# Patient Record
Sex: MALE | Race: BLACK OR AFRICAN AMERICAN | Employment: UNEMPLOYED | URBAN - METROPOLITAN AREA
[De-identification: names, ages, dates, MRNs, and addresses within clinical notes are randomized per-mention and may not be internally consistent; named-entity substitution may affect disease eponyms.]

---

## 2017-02-09 LAB — VITAMIN D 25-HYDROXY: 93.3 NG/ML

## 2017-06-20 ENCOUNTER — OFFICE VISIT (OUTPATIENT)
Dept: INTERNAL MEDICINE | Age: 57
End: 2017-06-20

## 2017-06-20 VITALS
DIASTOLIC BLOOD PRESSURE: 88 MMHG | WEIGHT: 163 LBS | SYSTOLIC BLOOD PRESSURE: 136 MMHG | BODY MASS INDEX: 22.08 KG/M2 | HEIGHT: 72 IN | HEART RATE: 96 BPM

## 2017-06-20 DIAGNOSIS — Z12.5 SPECIAL SCREENING FOR MALIGNANT NEOPLASM OF PROSTATE: Primary | ICD-10-CM

## 2017-06-20 DIAGNOSIS — F10.10 ALCOHOL ABUSE, DAILY USE: ICD-10-CM

## 2017-06-20 DIAGNOSIS — R74.01 ELEVATED TRANSAMINASE LEVEL: ICD-10-CM

## 2017-06-20 DIAGNOSIS — G47.00 INSOMNIA, UNSPECIFIED TYPE: ICD-10-CM

## 2017-06-20 DIAGNOSIS — E78.5 HYPERLIPIDEMIA, UNSPECIFIED HYPERLIPIDEMIA TYPE: ICD-10-CM

## 2017-06-20 DIAGNOSIS — R71.8 ELEVATED MCV: ICD-10-CM

## 2017-06-20 DIAGNOSIS — I10 ESSENTIAL HYPERTENSION: ICD-10-CM

## 2017-06-20 PROCEDURE — 99215 OFFICE O/P EST HI 40 MIN: CPT | Performed by: INTERNAL MEDICINE

## 2017-06-20 ASSESSMENT — PATIENT HEALTH QUESTIONNAIRE - PHQ9
1. LITTLE INTEREST OR PLEASURE IN DOING THINGS: 0
SUM OF ALL RESPONSES TO PHQ9 QUESTIONS 1 & 2: 0
SUM OF ALL RESPONSES TO PHQ QUESTIONS 1-9: 0
2. FEELING DOWN, DEPRESSED OR HOPELESS: 0

## 2017-06-20 ASSESSMENT — ENCOUNTER SYMPTOMS
TROUBLE SWALLOWING: 0
GASTROINTESTINAL NEGATIVE: 1
RESPIRATORY NEGATIVE: 1
BACK PAIN: 0

## 2018-06-07 PROBLEM — R17 JAUNDICE: Status: ACTIVE | Noted: 2018-06-07

## 2018-06-12 PROBLEM — E44.0 MODERATE MALNUTRITION (HCC): Chronic | Status: ACTIVE | Noted: 2018-06-12

## 2018-07-18 ENCOUNTER — ANESTHESIA EVENT (OUTPATIENT)
Dept: ENDOSCOPY | Age: 58
End: 2018-07-18
Payer: COMMERCIAL

## 2018-07-18 ENCOUNTER — HOSPITAL ENCOUNTER (OUTPATIENT)
Age: 58
Setting detail: OUTPATIENT SURGERY
Discharge: HOME | End: 2018-07-18
Attending: INTERNAL MEDICINE | Admitting: INTERNAL MEDICINE
Payer: COMMERCIAL

## 2018-07-18 ENCOUNTER — ANESTHESIA (OUTPATIENT)
Dept: ENDOSCOPY | Age: 58
End: 2018-07-18
Payer: COMMERCIAL

## 2018-07-18 VITALS
DIASTOLIC BLOOD PRESSURE: 66 MMHG | OXYGEN SATURATION: 97 % | RESPIRATION RATE: 19 BRPM | SYSTOLIC BLOOD PRESSURE: 88 MMHG

## 2018-07-18 VITALS
SYSTOLIC BLOOD PRESSURE: 95 MMHG | BODY MASS INDEX: 18.55 KG/M2 | WEIGHT: 140 LBS | TEMPERATURE: 97.7 F | RESPIRATION RATE: 16 BRPM | HEART RATE: 87 BPM | HEIGHT: 73 IN | OXYGEN SATURATION: 100 % | DIASTOLIC BLOOD PRESSURE: 63 MMHG

## 2018-07-18 PROCEDURE — 2720000010 HC SURG SUPPLY STERILE: Performed by: INTERNAL MEDICINE

## 2018-07-18 PROCEDURE — 2580000003 HC RX 258: Performed by: INTERNAL MEDICINE

## 2018-07-18 PROCEDURE — 3700000000 HC ANESTHESIA ATTENDED CARE: Performed by: INTERNAL MEDICINE

## 2018-07-18 PROCEDURE — 6360000002 HC RX W HCPCS: Performed by: NURSE ANESTHETIST, CERTIFIED REGISTERED

## 2018-07-18 PROCEDURE — 3700000001 HC ADD 15 MINUTES (ANESTHESIA): Performed by: INTERNAL MEDICINE

## 2018-07-18 PROCEDURE — 3609017100 HC EGD: Performed by: INTERNAL MEDICINE

## 2018-07-18 PROCEDURE — 7100000010 HC PHASE II RECOVERY - FIRST 15 MIN: Performed by: INTERNAL MEDICINE

## 2018-07-18 PROCEDURE — 2500000003 HC RX 250 WO HCPCS: Performed by: NURSE ANESTHETIST, CERTIFIED REGISTERED

## 2018-07-18 PROCEDURE — 7100000011 HC PHASE II RECOVERY - ADDTL 15 MIN: Performed by: INTERNAL MEDICINE

## 2018-07-18 RX ORDER — PROPOFOL 10 MG/ML
INJECTION, EMULSION INTRAVENOUS PRN
Status: DISCONTINUED | OUTPATIENT
Start: 2018-07-18 | End: 2018-07-18 | Stop reason: SDUPTHER

## 2018-07-18 RX ORDER — HYDRALAZINE HYDROCHLORIDE 20 MG/ML
5 INJECTION INTRAMUSCULAR; INTRAVENOUS EVERY 10 MIN PRN
Status: DISCONTINUED | OUTPATIENT
Start: 2018-07-18 | End: 2018-07-18 | Stop reason: HOSPADM

## 2018-07-18 RX ORDER — MEPERIDINE HYDROCHLORIDE 25 MG/ML
12.5 INJECTION INTRAMUSCULAR; INTRAVENOUS; SUBCUTANEOUS EVERY 5 MIN PRN
Status: DISCONTINUED | OUTPATIENT
Start: 2018-07-18 | End: 2018-07-18 | Stop reason: HOSPADM

## 2018-07-18 RX ORDER — METOCLOPRAMIDE HYDROCHLORIDE 5 MG/ML
10 INJECTION INTRAMUSCULAR; INTRAVENOUS
Status: DISCONTINUED | OUTPATIENT
Start: 2018-07-18 | End: 2018-07-18 | Stop reason: HOSPADM

## 2018-07-18 RX ORDER — MORPHINE SULFATE 2 MG/ML
1 INJECTION, SOLUTION INTRAMUSCULAR; INTRAVENOUS EVERY 5 MIN PRN
Status: DISCONTINUED | OUTPATIENT
Start: 2018-07-18 | End: 2018-07-18 | Stop reason: HOSPADM

## 2018-07-18 RX ORDER — SODIUM CHLORIDE, SODIUM LACTATE, POTASSIUM CHLORIDE, CALCIUM CHLORIDE 600; 310; 30; 20 MG/100ML; MG/100ML; MG/100ML; MG/100ML
INJECTION, SOLUTION INTRAVENOUS CONTINUOUS
Status: DISCONTINUED | OUTPATIENT
Start: 2018-07-18 | End: 2018-07-18 | Stop reason: HOSPADM

## 2018-07-18 RX ORDER — OXYCODONE HYDROCHLORIDE 5 MG/1
10 TABLET ORAL PRN
Status: DISCONTINUED | OUTPATIENT
Start: 2018-07-18 | End: 2018-07-18 | Stop reason: HOSPADM

## 2018-07-18 RX ORDER — LIDOCAINE HYDROCHLORIDE 20 MG/ML
INJECTION, SOLUTION EPIDURAL; INFILTRATION; INTRACAUDAL; PERINEURAL PRN
Status: DISCONTINUED | OUTPATIENT
Start: 2018-07-18 | End: 2018-07-18 | Stop reason: SDUPTHER

## 2018-07-18 RX ORDER — OXYCODONE HYDROCHLORIDE 5 MG/1
5 TABLET ORAL PRN
Status: DISCONTINUED | OUTPATIENT
Start: 2018-07-18 | End: 2018-07-18 | Stop reason: HOSPADM

## 2018-07-18 RX ORDER — LABETALOL HYDROCHLORIDE 5 MG/ML
5 INJECTION, SOLUTION INTRAVENOUS EVERY 10 MIN PRN
Status: DISCONTINUED | OUTPATIENT
Start: 2018-07-18 | End: 2018-07-18 | Stop reason: HOSPADM

## 2018-07-18 RX ORDER — DIPHENHYDRAMINE HYDROCHLORIDE 50 MG/ML
12.5 INJECTION INTRAMUSCULAR; INTRAVENOUS
Status: DISCONTINUED | OUTPATIENT
Start: 2018-07-18 | End: 2018-07-18 | Stop reason: HOSPADM

## 2018-07-18 RX ORDER — PROMETHAZINE HYDROCHLORIDE 25 MG/ML
6.25 INJECTION, SOLUTION INTRAMUSCULAR; INTRAVENOUS
Status: DISCONTINUED | OUTPATIENT
Start: 2018-07-18 | End: 2018-07-18 | Stop reason: HOSPADM

## 2018-07-18 RX ADMIN — LIDOCAINE HYDROCHLORIDE 100 MG: 20 INJECTION, SOLUTION EPIDURAL; INFILTRATION; INTRACAUDAL; PERINEURAL at 13:30

## 2018-07-18 RX ADMIN — PROPOFOL 80 MG: 10 INJECTION, EMULSION INTRAVENOUS at 13:30

## 2018-07-18 RX ADMIN — SODIUM CHLORIDE, SODIUM LACTATE, POTASSIUM CHLORIDE, AND CALCIUM CHLORIDE: 600; 310; 30; 20 INJECTION, SOLUTION INTRAVENOUS at 13:28

## 2018-07-18 RX ADMIN — PROPOFOL 5 MG: 10 INJECTION, EMULSION INTRAVENOUS at 13:38

## 2018-07-18 ASSESSMENT — PULMONARY FUNCTION TESTS
PIF_VALUE: 0

## 2018-07-18 NOTE — ANESTHESIA PRE PROCEDURE
Department of Anesthesiology  Preprocedure Note       Name:  Doris Khan   Age:  61 y.o.  :  1960                                          MRN:  [id number]         Date:  2018      Surgeon: Crystal Jones):  Tressa Nguyen MD    Procedure: Procedure(s):  EGD ESOPHAGOGASTRODUODENOSCOPY WITH MAC ANESTHESIA    Medications prior to admission:   Prior to Admission medications    Medication Sig Start Date End Date Taking?  Authorizing Provider   lactulose (CHRONULAC) 10 GM/15ML solution Take 30 mLs by mouth 3 times daily 18 Yes Samia Nova MD   rifaximin (XIFAXAN) 550 MG tablet Take 1 tablet by mouth 2 times daily 18  Yes Samia Nova MD   pantoprazole (PROTONIX) 40 MG tablet Take 1 tablet by mouth daily 18  Yes Samia Nova MD       Current medications:    Current Facility-Administered Medications   Medication Dose Route Frequency Provider Last Rate Last Dose    lactated ringers infusion   Intravenous Continuous Tressa Nguyen MD        HYDROmorphone (DILAUDID) injection 0.25 mg  0.25 mg Intravenous Q5 Min PRN Elena Rodgers MD        HYDROmorphone (DILAUDID) injection 0.5 mg  0.5 mg Intravenous Q5 Min PRN Elena Rodgers MD        morphine injection 1 mg  1 mg Intravenous Q5 Min PRKARLA Rodgers MD        HYDROmorphone (DILAUDID) injection 0.5 mg  0.5 mg Intravenous Q5 Min PRN Elena Rodgers MD        oxyCODONE (ROXICODONE) immediate release tablet 5 mg  5 mg Oral PRN Elena Rodgers MD        Or    oxyCODONE (ROXICODONE) immediate release tablet 10 mg  10 mg Oral PRN Elena Rodgers MD        diphenhydrAMINE (BENADRYL) injection 12.5 mg  12.5 mg Intravenous Once PRN Elena Rodgers MD        metoclopramide [facility]) injection 10 mg  10 mg Intravenous Once PRN Elena Rodgers MD        [facility]) injection 6.25 mg  6.25 mg Intravenous Once PRN Elena Rodgers MD        labetalol (NORMODYNE;TRANDATE) injection 5 mg  5 mg Intravenous

## 2018-07-26 DIAGNOSIS — N17.9 AKI (ACUTE KIDNEY INJURY) (HCC): ICD-10-CM

## 2018-07-26 LAB
ALBUMIN SERPL-MCNC: 3.7 G/DL (ref 3.4–5)
ANION GAP SERPL CALCULATED.3IONS-SCNC: 24 MMOL/L (ref 3–16)
BUN BLDV-MCNC: 16 MG/DL (ref 7–20)
CALCIUM SERPL-MCNC: 10.5 MG/DL (ref 8.3–10.6)
CHLORIDE BLD-SCNC: 91 MMOL/L (ref 99–110)
CO2: 19 MMOL/L (ref 21–32)
CREAT SERPL-MCNC: <0.5 MG/DL (ref 0.9–1.3)
GFR AFRICAN AMERICAN: >60
GFR NON-AFRICAN AMERICAN: >60
GLUCOSE BLD-MCNC: 124 MG/DL (ref 70–99)
PHOSPHORUS: 3.4 MG/DL (ref 2.5–4.9)
POTASSIUM SERPL-SCNC: 3.1 MMOL/L (ref 3.5–5.1)
SODIUM BLD-SCNC: 134 MMOL/L (ref 136–145)

## 2018-08-08 ENCOUNTER — OFFICE VISIT (OUTPATIENT)
Dept: INTERNAL MEDICINE | Age: 58
End: 2018-08-08

## 2018-08-08 VITALS
HEIGHT: 73 IN | DIASTOLIC BLOOD PRESSURE: 48 MMHG | BODY MASS INDEX: 15.24 KG/M2 | SYSTOLIC BLOOD PRESSURE: 70 MMHG | WEIGHT: 115 LBS | HEART RATE: 80 BPM

## 2018-08-08 DIAGNOSIS — R17 JAUNDICE: ICD-10-CM

## 2018-08-08 DIAGNOSIS — F10.11 HISTORY OF ALCOHOL ABUSE: ICD-10-CM

## 2018-08-08 DIAGNOSIS — K70.9 LIVER DISEASE, CHRONIC, DUE TO ALCOHOL (HCC): ICD-10-CM

## 2018-08-08 DIAGNOSIS — I10 ESSENTIAL HYPERTENSION: ICD-10-CM

## 2018-08-08 PROCEDURE — G8427 DOCREV CUR MEDS BY ELIG CLIN: HCPCS | Performed by: INTERNAL MEDICINE

## 2018-08-08 PROCEDURE — 1036F TOBACCO NON-USER: CPT | Performed by: INTERNAL MEDICINE

## 2018-08-08 PROCEDURE — 99215 OFFICE O/P EST HI 40 MIN: CPT | Performed by: INTERNAL MEDICINE

## 2018-08-08 PROCEDURE — 3017F COLORECTAL CA SCREEN DOC REV: CPT | Performed by: INTERNAL MEDICINE

## 2018-08-08 PROCEDURE — G8419 CALC BMI OUT NRM PARAM NOF/U: HCPCS | Performed by: INTERNAL MEDICINE

## 2018-08-08 RX ORDER — POTASSIUM CHLORIDE 750 MG/1
TABLET, FILM COATED, EXTENDED RELEASE ORAL
COMMUNITY
Start: 2018-08-02 | End: 2018-09-18 | Stop reason: CLARIF

## 2018-08-08 ASSESSMENT — ENCOUNTER SYMPTOMS
BLOOD IN STOOL: 0
VOMITING: 0
ABDOMINAL PAIN: 0
ABDOMINAL DISTENTION: 0
TROUBLE SWALLOWING: 0
DIARRHEA: 0
NAUSEA: 0
COUGH: 1

## 2018-08-08 ASSESSMENT — PATIENT HEALTH QUESTIONNAIRE - PHQ9
SUM OF ALL RESPONSES TO PHQ QUESTIONS 1-9: 0
SUM OF ALL RESPONSES TO PHQ QUESTIONS 1-9: 0
1. LITTLE INTEREST OR PLEASURE IN DOING THINGS: 0
2. FEELING DOWN, DEPRESSED OR HOPELESS: 0
SUM OF ALL RESPONSES TO PHQ9 QUESTIONS 1 & 2: 0

## 2018-08-08 NOTE — PROGRESS NOTES
for rash. Neurological: Positive for weakness. Negative for dizziness, light-headedness and headaches. Hematological: Bruises/bleeds easily. Psychiatric/Behavioral: Positive for sleep disturbance. OBJECTIVE:    BP (!) 70/48 (Site: Right Arm, Position: Sitting, Cuff Size: Medium Adult)   Pulse 80   Ht 6' 1\" (1.854 m)   Wt 115 lb (52.2 kg)   BMI 15.17 kg/m²      Physical Exam   Constitutional:   Frail. Moves very slowly. Requires some assistance in getting up on the exam table. Easily fatigued when ambulating. Eyes: EOM are normal. Scleral icterus is present. Neck: Neck supple. No JVD present. No thyromegaly present. Cardiovascular: Normal rate, regular rhythm and normal heart sounds. Exam reveals no gallop. No murmur heard. Pulmonary/Chest: Effort normal and breath sounds normal. No stridor. No respiratory distress. He has no wheezes. Abdominal: Soft. Bowel sounds are normal. He exhibits no pulsatile liver, no abdominal bruit and no pulsatile midline mass. There is hepatomegaly (tender palpable below ribs). There is tenderness in the right upper quadrant. Musculoskeletal: He exhibits no edema. Lymphadenopathy:        Head (right side): No submandibular adenopathy present. Head (left side): No submandibular adenopathy present. He has no cervical adenopathy. Right: No supraclavicular adenopathy present. Left: No supraclavicular adenopathy present. Neurological: He is alert. He displays no tremor. Skin: He is not diaphoretic. Jaundiced   Psychiatric: He is slowed. He is not actively hallucinating. Thought content is not delusional.   Very soft voice, slow response       ASSESSMENT:       Encounter Diagnoses   Name Primary?  History of alcohol abuse     Liver disease, chronic, due to alcohol ([facility] [address]     Jaundice     Essential hypertension        History of alcohol abuse  Patient has end-stage liver disease.   Although blood pressure is low he does not

## 2018-08-08 NOTE — ASSESSMENT & PLAN NOTE
No alcohol use and approximately 8 months. He has been referred for possible liver transplant evaluation.

## 2018-08-13 ENCOUNTER — TELEPHONE (OUTPATIENT)
Dept: INTERNAL MEDICINE | Age: 58
End: 2018-08-13

## 2018-08-13 NOTE — TELEPHONE ENCOUNTER
Glorine Landau states that pt needs PT order for Home Health    Pt mother is unable to treat wounds/ and care    Pt has a bed sore on right hip    Glorine Landau has a few suggestions for [facility] fax # [phone]

## 2018-08-13 NOTE — TELEPHONE ENCOUNTER
Patient has a bed sore r hip size of silver dollar, scab has formed. Patients mother says its really sore and she wants to know what to do about it. Should she use antibiotic ointment, cover it with sterile dressing? Please advise.

## 2018-08-13 NOTE — TELEPHONE ENCOUNTER
Polysporin ointment  Try to keep pressure off of the wound  If it looks infected or appears to be worsening then refer to wound clinic

## 2018-08-16 ENCOUNTER — HOSPITAL ENCOUNTER (OUTPATIENT)
Age: 58
Setting detail: OUTPATIENT SURGERY
Discharge: HOME | DRG: 280 | End: 2018-08-16
Attending: INTERNAL MEDICINE | Admitting: INTERNAL MEDICINE
Payer: COMMERCIAL

## 2018-08-16 ENCOUNTER — ANESTHESIA (OUTPATIENT)
Dept: ENDOSCOPY | Age: 58
DRG: 280 | End: 2018-08-16
Payer: COMMERCIAL

## 2018-08-16 ENCOUNTER — ANESTHESIA EVENT (OUTPATIENT)
Dept: ENDOSCOPY | Age: 58
DRG: 280 | End: 2018-08-16
Payer: COMMERCIAL

## 2018-08-16 VITALS
BODY MASS INDEX: 14.98 KG/M2 | OXYGEN SATURATION: 100 % | TEMPERATURE: 97.2 F | DIASTOLIC BLOOD PRESSURE: 65 MMHG | SYSTOLIC BLOOD PRESSURE: 114 MMHG | WEIGHT: 113 LBS | RESPIRATION RATE: 16 BRPM | HEIGHT: 73 IN | HEART RATE: 82 BPM

## 2018-08-16 VITALS
SYSTOLIC BLOOD PRESSURE: 89 MMHG | RESPIRATION RATE: 15 BRPM | DIASTOLIC BLOOD PRESSURE: 65 MMHG | OXYGEN SATURATION: 99 %

## 2018-08-16 DIAGNOSIS — K70.9 LIVER DISEASE, CHRONIC, DUE TO ALCOHOL (HCC): Primary | ICD-10-CM

## 2018-08-16 PROCEDURE — 6360000002 HC RX W HCPCS: Performed by: NURSE ANESTHETIST, CERTIFIED REGISTERED

## 2018-08-16 PROCEDURE — 3609012300 HC EGD BAND LIGATION ESOPHGEAL/GASTRIC VARICES: Performed by: INTERNAL MEDICINE

## 2018-08-16 PROCEDURE — 2709999900 HC NON-CHARGEABLE SUPPLY: Performed by: INTERNAL MEDICINE

## 2018-08-16 PROCEDURE — 2500000003 HC RX 250 WO HCPCS: Performed by: NURSE ANESTHETIST, CERTIFIED REGISTERED

## 2018-08-16 PROCEDURE — 7100000011 HC PHASE II RECOVERY - ADDTL 15 MIN: Performed by: INTERNAL MEDICINE

## 2018-08-16 PROCEDURE — 3700000000 HC ANESTHESIA ATTENDED CARE: Performed by: INTERNAL MEDICINE

## 2018-08-16 PROCEDURE — 06L38CZ OCCLUSION OF ESOPHAGEAL VEIN WITH EXTRALUMINAL DEVICE, VIA NATURAL OR ARTIFICIAL OPENING ENDOSCOPIC: ICD-10-PCS | Performed by: INTERNAL MEDICINE

## 2018-08-16 PROCEDURE — 0D738ZZ DILATION OF LOWER ESOPHAGUS, VIA NATURAL OR ARTIFICIAL OPENING ENDOSCOPIC: ICD-10-PCS | Performed by: INTERNAL MEDICINE

## 2018-08-16 PROCEDURE — 7100000010 HC PHASE II RECOVERY - FIRST 15 MIN: Performed by: INTERNAL MEDICINE

## 2018-08-16 RX ORDER — ZOLPIDEM TARTRATE 5 MG/1
5 TABLET ORAL NIGHTLY PRN
Qty: 30 TABLET | Refills: 5 | Status: SHIPPED | OUTPATIENT
Start: 2018-08-16 | End: 2018-08-30

## 2018-08-16 RX ORDER — PROPOFOL 10 MG/ML
INJECTION, EMULSION INTRAVENOUS PRN
Status: DISCONTINUED | OUTPATIENT
Start: 2018-08-16 | End: 2018-08-16 | Stop reason: SDUPTHER

## 2018-08-16 RX ORDER — FUROSEMIDE 20 MG/1
1 TABLET ORAL DAILY
Status: ON HOLD | COMMUNITY
Start: 2018-06-26 | End: 2018-08-29

## 2018-08-16 RX ORDER — LIDOCAINE HYDROCHLORIDE 20 MG/ML
INJECTION, SOLUTION EPIDURAL; INFILTRATION; INTRACAUDAL; PERINEURAL PRN
Status: DISCONTINUED | OUTPATIENT
Start: 2018-08-16 | End: 2018-08-16 | Stop reason: SDUPTHER

## 2018-08-16 RX ORDER — SPIRONOLACTONE 50 MG/1
1 TABLET, FILM COATED ORAL DAILY
Status: ON HOLD | COMMUNITY
Start: 2018-06-26 | End: 2018-08-29

## 2018-08-16 RX ORDER — PROPOFOL 10 MG/ML
INJECTION, EMULSION INTRAVENOUS CONTINUOUS PRN
Status: DISCONTINUED | OUTPATIENT
Start: 2018-08-16 | End: 2018-08-16 | Stop reason: SDUPTHER

## 2018-08-16 RX ADMIN — LIDOCAINE HYDROCHLORIDE 100 MG: 20 INJECTION, SOLUTION EPIDURAL; INFILTRATION; INTRACAUDAL; PERINEURAL at 13:32

## 2018-08-16 RX ADMIN — PROPOFOL 125 MCG/KG/MIN: 10 INJECTION, EMULSION INTRAVENOUS at 13:32

## 2018-08-16 RX ADMIN — PROPOFOL 20 MG: 10 INJECTION, EMULSION INTRAVENOUS at 13:34

## 2018-08-16 RX ADMIN — PROPOFOL 30 MG: 10 INJECTION, EMULSION INTRAVENOUS at 13:32

## 2018-08-16 ASSESSMENT — PULMONARY FUNCTION TESTS
PIF_VALUE: 0
PIF_VALUE: 1
PIF_VALUE: 0

## 2018-08-16 ASSESSMENT — PAIN DESCRIPTION - DESCRIPTORS: DESCRIPTORS: ACHING

## 2018-08-16 ASSESSMENT — PAIN - FUNCTIONAL ASSESSMENT: PAIN_FUNCTIONAL_ASSESSMENT: 0-10

## 2018-08-16 NOTE — FLOWSHEET NOTE
08/16/18 1411   Encounter Summary   Services provided to: Patient   Referral/Consult From: Robetr   Continue Visiting (8/16, ivelisse )   Complexity of Encounter Moderate   Length of Encounter 15 minutes   Routine   Type Pre-procedure   Assessment Calm; Approachable; Hopeful   Intervention Active listening;Explored feelings, thoughts, concerns;Nurtured hope   Outcome Expressed gratitude; Refused/declined

## 2018-08-16 NOTE — PROGRESS NOTES
Fred Ding is a 61 y.o. male patient. No current facility-administered medications for this encounter. No Known Allergies  Active Problems:    * No active hospital problems. *  Resolved Problems:    * No resolved hospital problems. *    Blood pressure (!) 98/51, pulse 80, temperature 97.2 °F (36.2 °C), temperature source Oral, resp. rate 16, height 6' 1\" (1.854 m), weight 113 lb (51.3 kg), SpO2 97 %. Subjective:  Symptoms:  He reports malaise, weakness and anorexia. Diet:  Poor intake. Activity level: Impaired due to pain. Pain:  He complains of pain that is moderate. He reports pain is unchanged. Pain is partially controlled. Objective:  General Appearance:  Uncomfortable and ill-appearing. Vital signs: (most recent): Blood pressure (!) 98/51, pulse 80, temperature 97.2 °F (36.2 °C), temperature source Oral, resp. rate 16, height 6' 1\" (1.854 m), weight 113 lb (51.3 kg), SpO2 97 %. Vital signs are normal.    Output: Producing urine and producing stool. HEENT: Normal HEENT exam.  (Wears glasses)    Lungs:  Normal effort. Heart: Normal rate. Abdomen: Abdomen is soft. There is no abdominal tenderness. Extremities: Normal range of motion. Neurological: Patient is alert and oriented to person, place and time. Skin:  Warm and dry. There is ulceration. (Right hip covered with gauze dressing. Family member states that she had talked to PCP regarding area and physician had encouraged to treat with Polysporin and dry dressing. Family member states that area is intact and not open.  Reddened with slight bruising noted. )    Assessment & [place] [address], RN  8/16/2018

## 2018-08-16 NOTE — ANESTHESIA PRE PROCEDURE
BANDING X 2 WITH MAC ANESTHESIA performed by Lindsay Rangel MD at [address] [facility] History:    Social History   Substance Use Topics    Smoking status: Former Smoker     Packs/day: 0.50     Years: 38.00     Types: Cigarettes     Start date: 5/2/1979     Quit date: 5/5/2017    Smokeless tobacco: Never Used      Comment: has not smoked in 6mo    Alcohol use 0.0 oz/week      Comment: 2 per day- not for 5months                                Counseling given: Not Answered      Vital Signs (Current): There were no vitals filed for this visit. BP Readings from Last 3 Encounters:   08/08/18 (!) 70/48   07/26/18 (!) 75/52   07/18/18 95/63       NPO Status: Time of last liquid consumption: 0000                        Time of last solid consumption: 0000                        Date of last liquid consumption: 08/16/18                        Date of last solid food consumption: 08/16/18    BMI:   Wt Readings from Last 3 Encounters:   08/08/18 115 lb (52.2 kg)   07/26/18 112 lb (50.8 kg)   07/18/18 140 lb (63.5 kg)     There is no height or weight on file to calculate BMI.    CBC:   Lab Results   Component Value Date    WBC 14.8 06/18/2018    RBC 2.72 06/18/2018    HGB 9.9 06/18/2018    HCT 28.2 06/18/2018    .9 06/18/2018    RDW 14.6 06/18/2018     06/18/2018       CMP:   Lab Results   Component Value Date     07/26/2018    K 3.1 07/26/2018    K 3.6 06/18/2018    CL 91 07/26/2018    CO2 19 07/26/2018    BUN 16 07/26/2018    CREATININE <0.5 07/26/2018    GFRAA >60 07/26/2018    AGRATIO 1.7 06/18/2018    LABGLOM >60 07/26/2018    GLUCOSE 124 07/26/2018    PROT 6.3 06/18/2018    CALCIUM 10.5 07/26/2018    BILITOT 24.8 06/18/2018    ALKPHOS 116 06/18/2018    AST 38 06/18/2018    ALT 8 06/18/2018       POC Tests: No results for input(s): POCGLU, POCNA, POCK, POCCL, POCBUN, POCHEMO, POCHCT in the last 72 hours.     Coags:   Lab Results   Component Value Date    PROTIME 28.1 06/18/2018    INR 2.46 06/18/2018       HCG (If Applicable): No results found for: PREGTESTUR, PREGSERUM, HCG, HCGQUANT     ABGs: No results found for: PHART, PO2ART, ZBH7AWA, IXT8IQS, BEART, N9NQPDGG     Type & Screen (If Applicable):  No results found for: LABABO, LABRH    Anesthesia Evaluation   no history of anesthetic complications:   Airway: Mallampati: II  TM distance: >3 FB   Neck ROM: full  Mouth opening: > = 3 FB Dental:      Comment: Good dentition    Pulmonary:                             ROS comment: Denies Asthma, COPD, Smoking   Cardiovascular:    (+) hypertension (controlled (took meds)):,     (-) past MI, CAD and  CHF             ROS comment: Denies CP, SOA with moderate exercise     Neuro/Psych:                ROS comment: Chronic EtOH abuse GI/Hepatic/Renal:   (+) GERD:, liver disease:,           Endo/Other:        (-) diabetes mellitus, hypothyroidism               Abdominal:           Vascular: negative vascular ROS. Anesthesia Plan      MAC     ASA 4       Induction: intravenous. Anesthetic plan and risks discussed with patient.                       Aden Gallardo MD   8/16/2018

## 2018-08-18 ENCOUNTER — APPOINTMENT (OUTPATIENT)
Dept: GENERAL RADIOLOGY | Age: 58
DRG: 280 | End: 2018-08-18
Payer: COMMERCIAL

## 2018-08-18 ENCOUNTER — APPOINTMENT (OUTPATIENT)
Dept: CT IMAGING | Age: 58
DRG: 280 | End: 2018-08-18
Payer: COMMERCIAL

## 2018-08-18 ENCOUNTER — HOSPITAL ENCOUNTER (INPATIENT)
Age: 58
LOS: 11 days | Discharge: NURSING, REHAB OR LONG-TERM CARE | DRG: 280 | End: 2018-08-29
Attending: EMERGENCY MEDICINE | Admitting: HOSPITALIST
Payer: COMMERCIAL

## 2018-08-18 DIAGNOSIS — E87.1 HYPONATREMIA: ICD-10-CM

## 2018-08-18 DIAGNOSIS — S09.90XA CLOSED HEAD INJURY, INITIAL ENCOUNTER: Primary | ICD-10-CM
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
## 2018-08-18 DIAGNOSIS — K70.9 LIVER DISEASE, CHRONIC, DUE TO ALCOHOL (HCC): ICD-10-CM

## 2018-08-18 PROBLEM — R62.7 FAILURE TO THRIVE IN ADULT: Status: ACTIVE | Noted: 2018-08-18

## 2018-08-18 LAB
A/G RATIO: 0.7 (ref 1.1–2.2)
ALBUMIN SERPL-MCNC: 3.3 G/DL (ref 3.4–5)
ALP BLD-CCNC: 195 U/L (ref 40–129)
ALT SERPL-CCNC: 25 U/L (ref 10–40)
AMMONIA: 55 UMOL/L (ref 16–60)
ANION GAP SERPL CALCULATED.3IONS-SCNC: 15 MMOL/L (ref 3–16)
AST SERPL-CCNC: 141 U/L (ref 15–37)
BACTERIA: ABNORMAL /HPF
BASOPHILS ABSOLUTE: 0.1 K/UL (ref 0–0.2)
BASOPHILS RELATIVE PERCENT: 0.4 %
BILIRUB SERPL-MCNC: 10.7 MG/DL (ref 0–1)
BILIRUBIN URINE: ABNORMAL
BLOOD, URINE: NEGATIVE
BUN BLDV-MCNC: 11 MG/DL (ref 7–20)
CALCIUM SERPL-MCNC: 8.6 MG/DL (ref 8.3–10.6)
CHLORIDE BLD-SCNC: 94 MMOL/L (ref 99–110)
CLARITY: CLEAR
CO2: 19 MMOL/L (ref 21–32)
COLOR: YELLOW
CREAT SERPL-MCNC: <0.5 MG/DL (ref 0.9–1.3)
EOSINOPHILS ABSOLUTE: 0.4 K/UL (ref 0–0.6)
EOSINOPHILS RELATIVE PERCENT: 2.2 %
EPITHELIAL CELLS, UA: ABNORMAL /HPF
GFR AFRICAN AMERICAN: >60
GFR NON-AFRICAN AMERICAN: >60
GLOBULIN: 4.8 G/DL
GLUCOSE BLD-MCNC: 102 MG/DL (ref 70–99)
GLUCOSE URINE: NEGATIVE MG/DL
HCT VFR BLD CALC: 23 % (ref 40.5–52.5)
HEMOGLOBIN: 7.9 G/DL (ref 13.5–17.5)
INR BLD: 2.19 (ref 0.86–1.14)
KETONES, URINE: ABNORMAL MG/DL
LEUKOCYTE ESTERASE, URINE: NEGATIVE
LIPASE: 89 U/L (ref 13–60)
LYMPHOCYTES ABSOLUTE: 2 K/UL (ref 1–5.1)
LYMPHOCYTES RELATIVE PERCENT: 11.2 %
MCH RBC QN AUTO: 37 PG (ref 26–34)
MCHC RBC AUTO-ENTMCNC: 34.1 G/DL (ref 31–36)
MCV RBC AUTO: 108.3 FL (ref 80–100)
MICROSCOPIC EXAMINATION: YES
MONOCYTES ABSOLUTE: 2.1 K/UL (ref 0–1.3)
MONOCYTES RELATIVE PERCENT: 11.4 %
NEUTROPHILS ABSOLUTE: 13.5 K/UL (ref 1.7–7.7)
NEUTROPHILS RELATIVE PERCENT: 74.8 %
NITRITE, URINE: NEGATIVE
PDW BLD-RTO: 13.7 % (ref 12.4–15.4)
PH UA: 6.5
PLATELET # BLD: 488 K/UL (ref 135–450)
PMV BLD AUTO: 8.7 FL (ref 5–10.5)
POTASSIUM REFLEX MAGNESIUM: 4.9 MMOL/L (ref 3.5–5.1)
PROTEIN UA: 30 MG/DL
PROTHROMBIN TIME: 25 SEC (ref 9.8–13)
RBC # BLD: 2.13 M/UL (ref 4.2–5.9)
RBC UA: ABNORMAL /HPF (ref 0–2)
SODIUM BLD-SCNC: 128 MMOL/L (ref 136–145)
SPECIFIC GRAVITY UA: 1.02
TOTAL PROTEIN: 8.1 G/DL (ref 6.4–8.2)
URINE TYPE: ABNORMAL
UROBILINOGEN, URINE: 1 E.U./DL
WBC # BLD: 18 K/UL (ref 4–11)
WBC UA: ABNORMAL /HPF (ref 0–5)

## 2018-08-18 PROCEDURE — 2580000003 HC RX 258: Performed by: STUDENT IN AN ORGANIZED HEALTH CARE EDUCATION/TRAINING PROGRAM

## 2018-08-18 PROCEDURE — 36415 COLL VENOUS BLD VENIPUNCTURE: CPT

## 2018-08-18 PROCEDURE — 82140 ASSAY OF AMMONIA: CPT

## 2018-08-18 PROCEDURE — 85025 COMPLETE CBC W/AUTO DIFF WBC: CPT

## 2018-08-18 PROCEDURE — 70450 CT HEAD/BRAIN W/O DYE: CPT

## 2018-08-18 PROCEDURE — 96360 HYDRATION IV INFUSION INIT: CPT

## 2018-08-18 PROCEDURE — 83690 ASSAY OF LIPASE: CPT

## 2018-08-18 PROCEDURE — 71046 X-RAY EXAM CHEST 2 VIEWS: CPT

## 2018-08-18 PROCEDURE — 99285 EMERGENCY DEPT VISIT HI MDM: CPT

## 2018-08-18 PROCEDURE — 85610 PROTHROMBIN TIME: CPT

## 2018-08-18 PROCEDURE — 81001 URINALYSIS AUTO W/SCOPE: CPT

## 2018-08-18 PROCEDURE — 1200000000 HC SEMI PRIVATE

## 2018-08-18 PROCEDURE — 80053 COMPREHEN METABOLIC PANEL: CPT

## 2018-08-18 PROCEDURE — 2580000003 HC RX 258: Performed by: EMERGENCY MEDICINE

## 2018-08-18 RX ORDER — SODIUM CHLORIDE 0.9 % (FLUSH) 0.9 %
10 SYRINGE (ML) INJECTION PRN
Status: DISCONTINUED | OUTPATIENT
Start: 2018-08-18 | End: 2018-08-29 | Stop reason: HOSPADM

## 2018-08-18 RX ORDER — 0.9 % SODIUM CHLORIDE 0.9 %
500 INTRAVENOUS SOLUTION INTRAVENOUS ONCE
Status: COMPLETED | OUTPATIENT
Start: 2018-08-18 | End: 2018-08-18

## 2018-08-18 RX ORDER — SODIUM CHLORIDE 9 MG/ML
INJECTION, SOLUTION INTRAVENOUS CONTINUOUS
Status: DISCONTINUED | OUTPATIENT
Start: 2018-08-19 | End: 2018-08-23

## 2018-08-18 RX ORDER — ACETAMINOPHEN 325 MG/1
650 TABLET ORAL EVERY 4 HOURS PRN
Status: DISCONTINUED | OUTPATIENT
Start: 2018-08-18 | End: 2018-08-29 | Stop reason: HOSPADM

## 2018-08-18 RX ORDER — ONDANSETRON 2 MG/ML
4 INJECTION INTRAMUSCULAR; INTRAVENOUS EVERY 6 HOURS PRN
Status: DISCONTINUED | OUTPATIENT
Start: 2018-08-18 | End: 2018-08-29 | Stop reason: HOSPADM

## 2018-08-18 RX ORDER — SODIUM CHLORIDE 0.9 % (FLUSH) 0.9 %
10 SYRINGE (ML) INJECTION EVERY 12 HOURS SCHEDULED
Status: DISCONTINUED | OUTPATIENT
Start: 2018-08-19 | End: 2018-08-29 | Stop reason: HOSPADM

## 2018-08-18 RX ADMIN — SODIUM CHLORIDE 500 ML: 9 INJECTION, SOLUTION INTRAVENOUS at 20:57

## 2018-08-18 RX ADMIN — SODIUM CHLORIDE 500 ML: 9 INJECTION, SOLUTION INTRAVENOUS at 22:34

## 2018-08-18 RX ADMIN — SODIUM CHLORIDE: 9 INJECTION, SOLUTION INTRAVENOUS at 23:46

## 2018-08-18 ASSESSMENT — PAIN SCALES - GENERAL
PAINLEVEL_OUTOF10: 0
PAINLEVEL_OUTOF10: 3

## 2018-08-18 NOTE — ED NOTES
Patient from home for failure to thrive and a fall yesterday. Hx. Of liver failure, per EMS mother reports patient has been consuming very minimal food, mostly supplementing diet with ensure x2 months. Mother reports significant weight loss in past two months. Patient had a home health evaluation done last week, agency felt inpatient care was more appropriate for patient's needs. Patient is alert, oriented x4. Reports pain to back. Denies chest pain, SOB.       Tressa Montez RN  08/18/18 2194

## 2018-08-19 LAB
ABO/RH: NORMAL
ALBUMIN SERPL-MCNC: 2.7 G/DL (ref 3.4–5)
ANION GAP SERPL CALCULATED.3IONS-SCNC: 11 MMOL/L (ref 3–16)
ANTIBODY SCREEN: NORMAL
BASOPHILS ABSOLUTE: 0 K/UL (ref 0–0.2)
BASOPHILS RELATIVE PERCENT: 0 %
BLOOD BANK DISPENSE STATUS: NORMAL
BLOOD BANK PRODUCT CODE: NORMAL
BPU ID: NORMAL
BUN BLDV-MCNC: 9 MG/DL (ref 7–20)
CALCIUM SERPL-MCNC: 8.4 MG/DL (ref 8.3–10.6)
CHLORIDE BLD-SCNC: 97 MMOL/L (ref 99–110)
CO2: 21 MMOL/L (ref 21–32)
CREAT SERPL-MCNC: 0.6 MG/DL (ref 0.9–1.3)
DESCRIPTION BLOOD BANK: NORMAL
EOSINOPHILS ABSOLUTE: 0.3 K/UL (ref 0–0.6)
EOSINOPHILS RELATIVE PERCENT: 2 %
GFR AFRICAN AMERICAN: >60
GFR NON-AFRICAN AMERICAN: >60
GLUCOSE BLD-MCNC: 107 MG/DL (ref 70–99)
HCT VFR BLD CALC: 19.6 % (ref 40.5–52.5)
HCT VFR BLD CALC: 22.3 % (ref 40.5–52.5)
HEMOGLOBIN: 6.7 G/DL (ref 13.5–17.5)
HEMOGLOBIN: 7.7 G/DL (ref 13.5–17.5)
LYMPHOCYTES ABSOLUTE: 1.9 K/UL (ref 1–5.1)
LYMPHOCYTES RELATIVE PERCENT: 12 %
MACROCYTES: ABNORMAL
MCH RBC QN AUTO: 36.4 PG (ref 26–34)
MCHC RBC AUTO-ENTMCNC: 34.3 G/DL (ref 31–36)
MCV RBC AUTO: 106.1 FL (ref 80–100)
MONOCYTES ABSOLUTE: 0.9 K/UL (ref 0–1.3)
MONOCYTES RELATIVE PERCENT: 6 %
NEUTROPHILS ABSOLUTE: 12.6 K/UL (ref 1.7–7.7)
NEUTROPHILS RELATIVE PERCENT: 80 %
PDW BLD-RTO: 13.3 % (ref 12.4–15.4)
PHOSPHORUS: 2.6 MG/DL (ref 2.5–4.9)
PLATELET # BLD: 359 K/UL (ref 135–450)
PMV BLD AUTO: 7.6 FL (ref 5–10.5)
POTASSIUM SERPL-SCNC: 3.7 MMOL/L (ref 3.5–5.1)
RBC # BLD: 1.85 M/UL (ref 4.2–5.9)
SODIUM BLD-SCNC: 129 MMOL/L (ref 136–145)
TEAR DROP CELLS: ABNORMAL
WBC # BLD: 15.7 K/UL (ref 4–11)

## 2018-08-19 PROCEDURE — G8997 SWALLOW GOAL STATUS: HCPCS

## 2018-08-19 PROCEDURE — 99222 1ST HOSP IP/OBS MODERATE 55: CPT | Performed by: HOSPITALIST

## 2018-08-19 PROCEDURE — 80069 RENAL FUNCTION PANEL: CPT

## 2018-08-19 PROCEDURE — 1200000000 HC SEMI PRIVATE

## 2018-08-19 PROCEDURE — 94760 N-INVAS EAR/PLS OXIMETRY 1: CPT

## 2018-08-19 PROCEDURE — 86850 RBC ANTIBODY SCREEN: CPT

## 2018-08-19 PROCEDURE — 36415 COLL VENOUS BLD VENIPUNCTURE: CPT

## 2018-08-19 PROCEDURE — 85018 HEMOGLOBIN: CPT

## 2018-08-19 PROCEDURE — G8996 SWALLOW CURRENT STATUS: HCPCS

## 2018-08-19 PROCEDURE — P9016 RBC LEUKOCYTES REDUCED: HCPCS

## 2018-08-19 PROCEDURE — 86901 BLOOD TYPING SEROLOGIC RH(D): CPT

## 2018-08-19 PROCEDURE — 92610 EVALUATE SWALLOWING FUNCTION: CPT

## 2018-08-19 PROCEDURE — 85014 HEMATOCRIT: CPT

## 2018-08-19 PROCEDURE — 85025 COMPLETE CBC W/AUTO DIFF WBC: CPT

## 2018-08-19 PROCEDURE — 6370000000 HC RX 637 (ALT 250 FOR IP): Performed by: STUDENT IN AN ORGANIZED HEALTH CARE EDUCATION/TRAINING PROGRAM

## 2018-08-19 PROCEDURE — 86923 COMPATIBILITY TEST ELECTRIC: CPT

## 2018-08-19 PROCEDURE — 86900 BLOOD TYPING SEROLOGIC ABO: CPT

## 2018-08-19 PROCEDURE — 2580000003 HC RX 258: Performed by: STUDENT IN AN ORGANIZED HEALTH CARE EDUCATION/TRAINING PROGRAM

## 2018-08-19 RX ORDER — PANTOPRAZOLE SODIUM 40 MG/1
40 TABLET, DELAYED RELEASE ORAL DAILY
Status: DISCONTINUED | OUTPATIENT
Start: 2018-08-19 | End: 2018-08-29 | Stop reason: HOSPADM

## 2018-08-19 RX ORDER — 0.9 % SODIUM CHLORIDE 0.9 %
250 INTRAVENOUS SOLUTION INTRAVENOUS ONCE
Status: DISCONTINUED | OUTPATIENT
Start: 2018-08-19 | End: 2018-08-25

## 2018-08-19 RX ADMIN — RIFAXIMIN 550 MG: 550 TABLET ORAL at 09:26

## 2018-08-19 RX ADMIN — RIFAXIMIN 550 MG: 550 TABLET ORAL at 22:00

## 2018-08-19 RX ADMIN — PANTOPRAZOLE SODIUM 40 MG: 40 TABLET, DELAYED RELEASE ORAL at 06:50

## 2018-08-19 RX ADMIN — RIFAXIMIN 550 MG: 550 TABLET ORAL at 02:11

## 2018-08-19 RX ADMIN — SODIUM CHLORIDE: 9 INJECTION, SOLUTION INTRAVENOUS at 16:12

## 2018-08-19 ASSESSMENT — ENCOUNTER SYMPTOMS
DIARRHEA: 0
NAUSEA: 0
COUGH: 0
ABDOMINAL PAIN: 1
HEMOPTYSIS: 0
CONSTIPATION: 0
BLURRED VISION: 0
DOUBLE VISION: 0
SINUS PAIN: 0
BLOOD IN STOOL: 0
SHORTNESS OF BREATH: 0
HEARTBURN: 0
VOMITING: 0
ORTHOPNEA: 0
SORE THROAT: 0
PHOTOPHOBIA: 0

## 2018-08-19 ASSESSMENT — PAIN SCALES - GENERAL: PAINLEVEL_OUTOF10: 0

## 2018-08-19 ASSESSMENT — PAIN - FUNCTIONAL ASSESSMENT: PAIN_FUNCTIONAL_ASSESSMENT: 0-10

## 2018-08-19 NOTE — ED PROVIDER NOTES
today his mother states he's been confused. She states he has been recently taking Ambien she's not sure if that caused confusion or his fall with head injury. Initially patient told me he fell today but he actually fell yesterday. I contacted the hospitalist patient will be noted for further evaluation and treatment. Patient was hyponatremic and did have an elevated white count      Clinical Impression     1. Closed head injury, initial encounter    2. Fall, initial encounter        Disposition/Plan     PATIENT REFERRED TO:  No follow-up provider specified. DISCHARGE MEDICATIONS:  New Prescriptions    No medications on file       DISPOSITION Admit       (Please note that portions of this note were completed with voice recognition software.   Efforts were made to edit the dictations but occasionally words are mis-transcribed.)         Tommy Munoz MD  08/19/18 5877

## 2018-08-19 NOTE — PROGRESS NOTES
with some difficulties swallowing per history. Hx of Liver Disease  - SLP Eval - recs appreciated  - PT/OT evals  - Dietician consulted   - Palliative care consulted   - continue home pantoprazole  - NS 75 mL/hr     End Stage Liver Disease - 2/2 alcohol abuse. Patient on transplant list. Banding for esophageal varices on 8/16  - continue rifaximin 550mg      Asymptomatic Acute on Chronic Anemia - Hgb at 6.7 today. No s/s acute blood loss. Recent varical banding 8/16/18. Transfusing 1 unit pRBC today. Likely multifactorial including malnutrition and end stage liver disease.   - repeat H&H 1 hour post transfusion   - daily CBC   - transfuse if Hgb<7  - continue home protonix 40 mg PO BID     Code Status:  Limited  FEN: DIET DYSPHAGIA I PUREED;  PPX: SCDs  DISPO: F  PT/OT Eval Status: Consulted     I will discuss the patient with the senior resident and Jailene Arora MD  -----------------------------  Joe Petersen MD  Internal Medicine Resident, PGY-1  Pager: [phone]    Addendum to Resident H& P/Progress note:  I have personally seen,examined and evaluated the patient.  I have reviewed the current history, physical findings, labs and assessment and plan and agree with note as documented by resident MD ( Jake Rivera)  + hyponatremia  + physical deconditioning    Jailene Arora MD, Jessicavishal Segura

## 2018-08-19 NOTE — PROGRESS NOTES
4 Eyes Admission Assessment     I agree as the admission nurse that 2 RN's have performed a thorough Head to Toe Skin Assessment on the patient. ALL assessment sites listed below have been assessed on admission. Areas assessed by both nurses  [x]   Head, Face, and Ears   [x]   Shoulders, Back, and Chest  [x]   Arms, Elbows, and Hands   [x]   Coccyx, Sacrum, and Ischum  [x]   Legs, Feet, and Heels        Does the Patient have Skin Breakdown?   Yes a wound was noted on the Admission Assessment and an LDA was Initiated documentation include the Daphney-wound, Wound Assessment, Measurements, Dressing Treatment, Drainage, and Color\",         Cody Prevention initiated:  Yes   Wound Care Orders initiated:  No      WOC nurse consulted for Pressure Injury (Stage 3,4, Unstageable, DTI, NWPT, and Complex wounds):  No      Nurse 1 eSignature: Electronically signed by Eugenio Aviles RN on 8/19/18 at 2:41 AM    **SHARE this note so that the co-signing nurse is able to place an eSignature**    Nurse 2 eSignature: Electronically signed by Brandin Brantley RN on 8/19/18 at 6:51 AM

## 2018-08-19 NOTE — PROGRESS NOTES
been drinking clear liquids 2/2 intolerance of solids as he \"spits up\" right after swallowing. From an oropharyngeal perspective pt can tolerate a regular diet with thin liquids; however pt prefers softer foods therefore recommend Dysphagia II mechanically altered. From a GI standpoint may consider GI soft diet vs clear liquids. Recommend GI consult during this admission to further assess esophageal dysfunction. Dysphagia Outcome Severity Scale: Level 6: Within functional limits/Modified independence     Treatment Plan  Requires SLP Intervention: Yes     D/C Recommendations: To be determined  Referral To: GI    Recommended Diet and Intervention  Diet Solids Recommendation: Dysphagia II Mechanically Altered  Liquid Consistency Recommendation: Thin  Recommended Form of Meds: Whole with water       Compensatory Swallowing Strategies  Upright positioning  Alternate bites/drinks  Sit upright 30-60 minutes after po intake    Treatment/Goals  Dysphagia Goals:   1. The patient will demonstrate understanding re: clinical signs and symptoms of aspiration. 2. The patient/caregiver will demonstrate understanding of reflux precautions for upright positioning 30-60 minutes after po intake, slow rate, small bites/drinks. General  Chart Reviewed: Yes  Comments: 60yoM with PMH of EtOH abuse, chronic liver failure, HLD and HTN who presents with failure to thrive and lethargy. Patient currently lives at home with his mother who cares for him. Over the last few months she states that he has been more lethargic. Patient states for about the last 6 months he has had very little appetite often only being able to eat 1-2 clear Glucerna for a meal or even the day. He stats that his stomach hurts when he eats. Within the last month he states that he was given Protonix which has helped the pain but he still doesn't eat much. He also noted that when eating solid foods he will often spit the foods up right after trying to swallow them.

## 2018-08-20 PROBLEM — E43 SEVERE MALNUTRITION (HCC): Chronic | Status: ACTIVE | Noted: 2018-08-20

## 2018-08-20 LAB
ALBUMIN SERPL-MCNC: 2.6 G/DL (ref 3.4–5)
ANION GAP SERPL CALCULATED.3IONS-SCNC: 12 MMOL/L (ref 3–16)
BASOPHILS ABSOLUTE: 0.1 K/UL (ref 0–0.2)
BASOPHILS RELATIVE PERCENT: 0.5 %
BUN BLDV-MCNC: 7 MG/DL (ref 7–20)
CALCIUM SERPL-MCNC: 7.9 MG/DL (ref 8.3–10.6)
CHLORIDE BLD-SCNC: 101 MMOL/L (ref 99–110)
CO2: 17 MMOL/L (ref 21–32)
CREAT SERPL-MCNC: <0.5 MG/DL (ref 0.9–1.3)
EOSINOPHILS ABSOLUTE: 0.5 K/UL (ref 0–0.6)
EOSINOPHILS RELATIVE PERCENT: 3.1 %
GFR AFRICAN AMERICAN: >60
GFR NON-AFRICAN AMERICAN: >60
GLUCOSE BLD-MCNC: 100 MG/DL (ref 70–99)
HCT VFR BLD CALC: 22.6 % (ref 40.5–52.5)
HEMOGLOBIN: 7.6 G/DL (ref 13.5–17.5)
LYMPHOCYTES ABSOLUTE: 1.9 K/UL (ref 1–5.1)
LYMPHOCYTES RELATIVE PERCENT: 12.2 %
MAGNESIUM: 1.6 MG/DL (ref 1.8–2.4)
MCH RBC QN AUTO: 35.9 PG (ref 26–34)
MCHC RBC AUTO-ENTMCNC: 33.9 G/DL (ref 31–36)
MCV RBC AUTO: 106 FL (ref 80–100)
MONOCYTES ABSOLUTE: 1.7 K/UL (ref 0–1.3)
MONOCYTES RELATIVE PERCENT: 10.6 %
NEUTROPHILS ABSOLUTE: 11.6 K/UL (ref 1.7–7.7)
NEUTROPHILS RELATIVE PERCENT: 73.6 %
PDW BLD-RTO: 15.2 % (ref 12.4–15.4)
PHOSPHORUS: 1.8 MG/DL (ref 2.5–4.9)
PLATELET # BLD: 328 K/UL (ref 135–450)
PMV BLD AUTO: 7.7 FL (ref 5–10.5)
POTASSIUM SERPL-SCNC: 3 MMOL/L (ref 3.5–5.1)
RBC # BLD: 2.13 M/UL (ref 4.2–5.9)
SODIUM BLD-SCNC: 130 MMOL/L (ref 136–145)
WBC # BLD: 15.8 K/UL (ref 4–11)

## 2018-08-20 PROCEDURE — 83735 ASSAY OF MAGNESIUM: CPT

## 2018-08-20 PROCEDURE — 85025 COMPLETE CBC W/AUTO DIFF WBC: CPT

## 2018-08-20 PROCEDURE — 99232 SBSQ HOSP IP/OBS MODERATE 35: CPT | Performed by: HOSPITALIST

## 2018-08-20 PROCEDURE — 6370000000 HC RX 637 (ALT 250 FOR IP): Performed by: STUDENT IN AN ORGANIZED HEALTH CARE EDUCATION/TRAINING PROGRAM

## 2018-08-20 PROCEDURE — 97116 GAIT TRAINING THERAPY: CPT

## 2018-08-20 PROCEDURE — 36415 COLL VENOUS BLD VENIPUNCTURE: CPT

## 2018-08-20 PROCEDURE — 2500000003 HC RX 250 WO HCPCS: Performed by: STUDENT IN AN ORGANIZED HEALTH CARE EDUCATION/TRAINING PROGRAM

## 2018-08-20 PROCEDURE — 97535 SELF CARE MNGMENT TRAINING: CPT

## 2018-08-20 PROCEDURE — 2580000003 HC RX 258: Performed by: STUDENT IN AN ORGANIZED HEALTH CARE EDUCATION/TRAINING PROGRAM

## 2018-08-20 PROCEDURE — G8978 MOBILITY CURRENT STATUS: HCPCS

## 2018-08-20 PROCEDURE — 80069 RENAL FUNCTION PANEL: CPT

## 2018-08-20 PROCEDURE — 97166 OT EVAL MOD COMPLEX 45 MIN: CPT

## 2018-08-20 PROCEDURE — G8988 SELF CARE GOAL STATUS: HCPCS

## 2018-08-20 PROCEDURE — 99221 1ST HOSP IP/OBS SF/LOW 40: CPT | Performed by: NURSE PRACTITIONER

## 2018-08-20 PROCEDURE — 1200000000 HC SEMI PRIVATE

## 2018-08-20 PROCEDURE — G8979 MOBILITY GOAL STATUS: HCPCS

## 2018-08-20 PROCEDURE — 6360000002 HC RX W HCPCS: Performed by: HOSPITALIST

## 2018-08-20 PROCEDURE — G8987 SELF CARE CURRENT STATUS: HCPCS

## 2018-08-20 PROCEDURE — 97161 PT EVAL LOW COMPLEX 20 MIN: CPT

## 2018-08-20 PROCEDURE — 92526 ORAL FUNCTION THERAPY: CPT

## 2018-08-20 RX ORDER — UREA 10 %
5 LOTION (ML) TOPICAL NIGHTLY PRN
Status: DISCONTINUED | OUTPATIENT
Start: 2018-08-20 | End: 2018-08-29 | Stop reason: HOSPADM

## 2018-08-20 RX ORDER — POTASSIUM CHLORIDE 20 MEQ/1
40 TABLET, EXTENDED RELEASE ORAL 2 TIMES DAILY WITH MEALS
Status: COMPLETED | OUTPATIENT
Start: 2018-08-20 | End: 2018-08-20

## 2018-08-20 RX ORDER — MAGNESIUM SULFATE IN WATER 40 MG/ML
2 INJECTION, SOLUTION INTRAVENOUS ONCE
Status: COMPLETED | OUTPATIENT
Start: 2018-08-20 | End: 2018-08-20

## 2018-08-20 RX ADMIN — SODIUM CHLORIDE: 9 INJECTION, SOLUTION INTRAVENOUS at 05:09

## 2018-08-20 RX ADMIN — PANTOPRAZOLE SODIUM 40 MG: 40 TABLET, DELAYED RELEASE ORAL at 05:08

## 2018-08-20 RX ADMIN — SODIUM CHLORIDE: 9 INJECTION, SOLUTION INTRAVENOUS at 20:44

## 2018-08-20 RX ADMIN — POTASSIUM CHLORIDE 40 MEQ: 20 TABLET, EXTENDED RELEASE ORAL at 17:05

## 2018-08-20 RX ADMIN — RIFAXIMIN 550 MG: 550 TABLET ORAL at 20:44

## 2018-08-20 RX ADMIN — POTASSIUM PHOSPHATE, MONOBASIC AND POTASSIUM PHOSPHATE, DIBASIC 10 MMOL: 224; 236 INJECTION, SOLUTION, CONCENTRATE INTRAVENOUS at 12:25

## 2018-08-20 RX ADMIN — POTASSIUM CHLORIDE 40 MEQ: 20 TABLET, EXTENDED RELEASE ORAL at 08:04

## 2018-08-20 RX ADMIN — MAGNESIUM SULFATE IN WATER 2 G: 40 INJECTION, SOLUTION INTRAVENOUS at 10:21

## 2018-08-20 RX ADMIN — RIFAXIMIN 550 MG: 550 TABLET ORAL at 08:04

## 2018-08-20 ASSESSMENT — PAIN SCALES - GENERAL: PAINLEVEL_OUTOF10: 0

## 2018-08-20 NOTE — PROGRESS NOTES
Speech Language Pathology  Facility/Department: [address]  Dysphagia Daily Treatment Note/DC    NAME: Sydnee Hogue  : 1960  MRN: [id number]    Patient Diagnosis(es):   Patient Active Problem List    Diagnosis Date Noted    Hyponatremia     Chronic blood loss anemia     [facility] stage liver disease ([facility] [address]     Physical deconditioning     Failure to thrive in adult 2018    Moderate malnutrition ([facility] [address] 2018    Liver disease, chronic, due to alcohol ([facility] [address]     Goals of care, counseling/discussion     Advanced directives, counseling/discussion     Encounter for palliative care     Jaundice 2018    Elevated transaminase level 2016    Elevated MCV 2016    Cigarette smoker 2016    History of alcohol abuse 2016    Insomnia disorder 2016    Hypertension     Hyperlipidemia      Allergies: No Known Allergies    Recent Chest Xray/CT of Chest: (18)  No findings for acute cardiopulmonary disease.     CT head 18      1.  No findings for acute intracranial abnormality.       2.  Right frontal scalp edema without underlying skull fracture.           Previous MBS - n/a  Chart reviewed. Medical Diagnosis: failure to thrive  Treatment Diagnosis: dysphagia    BSE Impression 18  Oropharyngeal swallow fuction appears grossly intact. Pt trialed with thin liquid by straw, puree, soft solid and hard solid. There are no s/s penetration/aspiration with any consistency. Oral motor and hyolaryngeal mechanics are adequate. Swallow initiation is timely. With solids there is timely mastication and adequate oral clearance. Pt reports h/o esophageal dysphagia for which he follows Dr. Lisa Reyes as an outpatient. He reports he has had 4 upper GIs and multiple \"patches\" put in his stomach to \"prevent bleeding. \" He states for the last few months he has only been drinking clear liquids 2/2 intolerance of solids as he \"spits up\" right after swallowing.  From

## 2018-08-20 NOTE — PROGRESS NOTES
Regular rate & rhythm and normal heart sounds. Exam reveals no gallop and no friction rub. No murmur heard. Pulmonary/Chest: Effort normal and breath sounds normal. No respiratory distress. He has no wheezes. He has no rales. Abdominal: Soft. Bowel sounds are normal. He exhibits no distension. There is no tenderness. There is no rebound and no guarding. Musculoskeletal: He exhibits no edema or tenderness. Swelling noted on right shoulder blade. Neurological: He is alert and oriented to person, place, and time. GCS score is 15. Skin: Skin is warm and dry. No erythema. No results for input(s): PHART, XIH6XTJ, PO2ART in the last 72 hours. DATA:       Labs  CBC:   Recent Labs      08/18/18 1856 08/19/18   0537  08/19/18   1300  08/20/18   0547   WBC  18.0*  15.7*   --   15.8*   HGB  7.9*  6.7*  7.7*  7.6*   HCT  23.0*  19.6*  22.3*  22.6*   PLT  488*  359   --   328       BMP: Recent Labs      08/18/18 1856 08/19/18   0538  08/20/18   0547   NA  128*  129*  130*   K  4.9  3.7  3.0*   CL  94*  97*  101   CO2  19*  21  17*   BUN  11  9  7   CREATININE  <0.5*  0.6*  <0.5*   GLUCOSE  102*  107*  100*     LFT's: Recent Labs      08/18/18 1856   AST  141*   ALT  25   BILITOT  10.7*   ALKPHOS  195*     Troponin: No results for input(s): TROPONINI in the last 72 hours. BNP: No results for input(s): BNP in the last 72 hours. INR:   Recent Labs      08/18/18 1856   INR  2.19*     Lipids: No results for input(s): CHOL, HDL in the last 72 hours. Invalid input(s): LDLCALCU  CK: No results for input(s): CKTOTAL in the last 72 hours. Urinalysis:  Lab Results   Component Value Date    NITRU Negative 08/18/2018    WBCUA 0-2 08/18/2018    BACTERIA Rare 08/18/2018    RBCUA 0-2 08/18/2018    BLOODU Negative 08/18/2018    SPECGRAV 1.020 08/18/2018    GLUCOSEU Negative 08/18/2018       Radiology:  CT Head WO Contrast   Final Result      1. No findings for acute intracranial abnormality.       2.  Right frontal scalp edema without underlying skull fracture. XR CHEST STANDARD (2 VW)   Final Result      1. No findings for acute cardiopulmonary disease. ASSESSMENT AND PLAN:   Earl Miller a 61 y. o. male w/ hx of alcoholic liver disease, HTN and HLD p/w failure to thrive.      Failure to thrive - patient has had lack of appetite for the last 6 months. Patient with some difficulties swallowing per history. Hx of Liver Disease  - dysphagia II mechanically altered diet per SLP recs  - PT/OT consulted  - Dietician consulted   - Palliative care consulted   - social work consulted   - IVF  - replace electrolytes as needed      End Stage Liver Disease - MELD score 29 on admission. 2/2 alcohol abuse, patient reports he has not drank in last 7 months. Patient with meeting for transplant this week. Banding for esophageal varices on 8/16.   - continue rifaximin 550mg       Asymptomatic Acute on Chronic Anemia - Hgb stable post 1 unit pRBC yesterday. No s/s acute blood loss. Recent varical banding 8/16/18. Likely multifactorial including malnutrition and end stage liver disease.   - daily CBC   - transfuse if Hgb<7  - continue home protonix 40 mg PO BID     Hyponatremia - likely hypovolemic hyponatremia 2/2 poor PO intake and dehydration. Na at 130 today, improving.  - NS 75 mL/hr  - daily renal panel     Physical Deconditioning - 2/2 comorbid conditions.   - PT/OT  - dietary consulted     Code Status:  Limited  FEN: DIET DYSPHAGIA II MECHANICALLY ALTERED; Dysphagia II Mechanically Altered  PPX: SCDs  DISPO: [facility] - will need SNF placement   PT/OT Eval Status: Consulted     I will discuss the patient with the senior resident and Lanney Hammans, MD  -----------------------------  Parish Mantilla MD  Internal Medicine Resident, PGY-1  Pager: [phone]    Addendum to Resident H& P/Progress note:  I have personally seen,examined and evaluated the patient.  I have reviewed the current history, physical findings, labs

## 2018-08-20 NOTE — PROGRESS NOTES
assistance. Functioning below his baseline. Would benefit from cont PT to maximize independence. Treatment Diagnosis: impaired functional mobility d/t liver failure  Prognosis: Good  Decision Making: Low Complexity  Patient Education: role of PT, importance of OOB. REQUIRES PT FOLLOW UP: Yes  Activity Tolerance  Activity Tolerance: Patient Tolerated treatment well;Patient limited by fatigue         Plan   Plan  Times per week: 2-5  Current Treatment Recommendations: Balance Training, Functional Mobility Training, Transfer Training, Gait Training, Safety Education & Training, Endurance Training  Safety Devices  Type of devices: Call light within reach, Chair alarm in place, Left in chair, Nurse notified    G-Code  PT G-Codes  Functional Limitation: Mobility: Walking and moving around  Mobility: Walking and Moving Around Current Status (): At least 40 percent but less than 60 percent impaired, limited or restricted  Mobility: Walking and Moving Around Goal Status (): At least 20 percent but less than 40 percent impaired, limited or restricted                                           AM-PAC Score  AM-PAC Inpatient Mobility Raw Score : 19  AM-PAC Inpatient T-Scale Score : 45.44  Mobility Inpatient CMS 0-100% Score: 41.77  Mobility Inpatient CMS G-Code Modifier : CK          Goals  Short term goals  Time Frame for Short term goals: D/C  Short term goal 1: sit<->stand SBA  Short term goal 2: Amb >150 ft with/without AAD SBA  Patient Goals   Patient goals : to get stronger       Therapy Time   Individual Concurrent Group Co-treatment   Time In 1033         Time Out 1058         Minutes 25               This note will serve as a discharge summary if pt discharged prior to next treatment session.   Vivian Pastor, PT

## 2018-08-20 NOTE — CARE COORDINATION
Palliative Care Note:     NAME:  Angel Rae  Admit Date: 8/18/2018  Hospital Day:  Hospital Day: 3   Current Code status: Limited        Goals of care/Code status:  DNR-CC    Discharge plan:   Social Work following with palliative care for goals of care. Patient familiar to team from admission in June. Patient admitted for failure to thrive. Patient has a history of ETOH abuse, chronic liver failure, HLD and HTN. PTA patient was living at home with his mom. Patient has been so weak that mom has been helping with needs. Patient is looking forward to  liver transplant appointment this Wednesday, 8/22. Patient states he has been sober since December 2017 but does have documentation from treatment or AA meetings. Patient and mother voice wanting to go to a nursing facility at discharge; choices [facility] and [facility] which are not in insurance network. RN Emmanuel updated and insurance list provided. Hospice services discussed as supportive option after patient is seen for Liver appointment. PC team to continue to support while inpatient.      VALERY Dougherty/VICTORIANO  Palliative Care   [phone]

## 2018-08-20 NOTE — PROGRESS NOTES
Occupational Therapy   Occupational Therapy Initial Assessment/Treatment  Date: 2018   Patient Name: Kj Penn  MRN: [id number]     : 1960    Date of Service: 2018    Discharge Recommendations: Kj Penn scored a 21/24 on the AM-PAC ADL Inpatient form. Current research shows that an AM-PAC score of 18 or greater is typically associated with a discharge to the patient's home setting. Based on the patients AM-PAC score and their current ADL deficits, it is recommended that the patient have 2-3 sessions per week of Occupational Therapy at d/c to increase the patients independence. OT Equipment Recommendations  Equipment Needed: No      Treatment Diagnosis: Impaired functional activity tolerance and functional mobility      Restrictions  Position Activity Restriction  Other position/activity restrictions: up with assistance, up in chair. Subjective   General  Chart Reviewed: Yes  Patient assessed for rehabilitation services?: Yes  Additional Pertinent Hx: Pt admitted from home 18 after a fall the previous day. Head CT= Right frontal scalp edema without underlying skull fracture. PMH includes: cirrhosis, HTN, Rt knee surgery  Family / Caregiver Present: No  Referring Practitioner: Dr. Yumi Daugherty  Diagnosis: Failure to thrive  Subjective  Subjective: Pt in bed upon entry. Pt c/o of noise from IV machine.   Pain Assessment  Patient Currently in Pain: Yes (posterior head, not rated.)  Pain Assessment: 0-10  Pain Level: 0     Social/Functional History  Social/Functional History  Lives With:  (Mother)  Type of Home:  (condo)  Home Layout: One level  Home Access: Level entry  Bathroom Shower/Tub: Tub/Shower unit  Bathroom Toilet: Standard  Bathroom Equipment: Grab bars in shower, Shower chair  Home Equipment: [employer], Rolling walker  ADL Assistance: Independent  Homemaking Responsibilities: No (Pt's mother manages)  Ambulation Assistance: Independent  Transfer Assistance:

## 2018-08-20 NOTE — PLAN OF CARE
Problem: Falls - Risk of:  Goal: Will remain free from falls  Will remain free from falls   Outcome: Ongoing  At high risk for falls per Pavan Barnes scale. Oriented x 4. Generally weak. He did try to get OOB x 1 overnight without calling for assistance. Needed items in reach. Fall precautions in place. Problem: Fluid Volume - Imbalance:  Intervention: Assess signs and symptoms of dehydration  Awake and alert. Sbp  overnight. Other VSS. Skin warm and dry. Denied feeling dizzy or lightheaded when OOB. IVFs as ordered. Will continue to monitor and plan of care.

## 2018-08-20 NOTE — CONSULTS
record      History of Present Illness:         Patient is a 61year old male with a Pmhx for ETOH abuse, chronic liver failure, HLD and HTn who presented with F2T. Patient currently lives with his mom who helps care for him, and over the past few months she stated that he has bene more lethargic often only getting out of bed when he has appts. On Sunday he c/o of right hip pain and was found to have a bed sore. Patient has had falls, decreased in appetite, generalized weakness, and at this time patient's mother cannot care for him. He has a meeting with the Transplant coordinator on Wednesday.        Subjective:                     Past Medical History:        Diagnosis Date    Cirrhosis ([facility] [address]     Hyperlipidemia     Hypertension        Past Surgical History:        Procedure Laterality Date    KNEE SURGERY Right 1969    fall; glass injury - laceration    DE ESOPHAGOGASTRODUODENOSCOPY TRANSORAL DIAGNOSTIC N/A 7/18/2018    EGD ESOPHAGOGASTRODUODENOSCOPY WITH ESOPHAGEAL VARICES BANDING X 2 WITH MAC ANESTHESIA performed by Nneka Torres MD at [place] [address] N/A 8/16/2018    EGD BAND LIGATION OF ESOPHAGEAL VARICES X3 WITH MAC performed by Nneka Torres MD at [facility] ENDOSCOPY       Current Medications:    Current Facility-Administered Medications: potassium chloride (KLOR-CON M) extended release tablet 40 mEq, 40 mEq, Oral, BID WC  magnesium sulfate 2 g in 50 mL IVPB premix, 2 g, Intravenous, Once  rifaximin (XIFAXAN) tablet 550 mg, 550 mg, Oral, BID  pantoprazole (PROTONIX) tablet 40 mg, 40 mg, Oral, Daily  0.9 % sodium chloride bolus, 250 mL, Intravenous, Once  sodium chloride flush 0.9 % injection 10 mL, 10 mL, Intravenous, 2 times per day  sodium chloride flush 0.9 % injection 10 mL, 10 mL, Intravenous, PRN  magnesium hydroxide (MILK OF MAGNESIA) 400 MG/5ML suspension 30 mL, 30 mL, Oral, Daily PRN  ondansetron (ZOFRAN) injection 4 mg, 4 mg, Intravenous, Q6H 1\" (1.854 m)   Wt 114 lb 13.8 oz (52.1 kg)   SpO2 97%   BMI 15.15 kg/m²     DATA:    CBC with Differential:    Lab Results   Component Value Date    WBC 15.8 08/20/2018    RBC 2.13 08/20/2018    HGB 7.6 08/20/2018    HCT 22.6 08/20/2018     08/20/2018    .0 08/20/2018    MCH 35.9 08/20/2018    MCHC 33.9 08/20/2018    RDW 15.2 08/20/2018    LYMPHOPCT 12.2 08/20/2018    MONOPCT 10.6 08/20/2018    BASOPCT 0.5 08/20/2018    MONOSABS 1.7 08/20/2018    LYMPHSABS 1.9 08/20/2018    EOSABS 0.5 08/20/2018    BASOSABS 0.1 08/20/2018     CMP:    Lab Results   Component Value Date     08/20/2018    K 3.0 08/20/2018    K 4.9 08/18/2018     08/20/2018    CO2 17 08/20/2018    BUN 7 08/20/2018    CREATININE <0.5 08/20/2018    GFRAA >60 08/20/2018    AGRATIO 0.7 08/18/2018    LABGLOM >60 08/20/2018    GLUCOSE 100 08/20/2018    PROT 8.1 08/18/2018    LABALBU 2.6 08/20/2018    CALCIUM 7.9 08/20/2018    BILITOT 10.7 08/18/2018    ALKPHOS 195 08/18/2018     08/18/2018    ALT 25 08/18/2018     Hepatic Function Panel:    Lab Results   Component Value Date    ALKPHOS 195 08/18/2018    ALT 25 08/18/2018     08/18/2018    PROT 8.1 08/18/2018    BILITOT 10.7 08/18/2018    BILIDIR >10.0 06/07/2018    IBILI see below 06/07/2018    LABALBU 2.6 08/20/2018     Albumin:    Lab Results   Component Value Date    LABALBU 2.6 08/20/2018         Conclusion/Time spent:         Recommendations see above    Time spent with patient and/or family:20  Time reviewing records:10  Time communicating with staff:10    A total of 40 minutes spent with the patient and family on unit greater than 50% in counseling regarding palliative care and in goals of care for the patient. Thank you to Dr. Sera Grajeda for this consultation. We will continue to follow  Jeanie Sandro gerard as needed.       Jatin Musa, BEVERLY/CNP  Palliative Care  [phone]

## 2018-08-21 ENCOUNTER — APPOINTMENT (OUTPATIENT)
Dept: ULTRASOUND IMAGING | Age: 58
DRG: 280 | End: 2018-08-21
Payer: COMMERCIAL

## 2018-08-21 ENCOUNTER — APPOINTMENT (OUTPATIENT)
Dept: CT IMAGING | Age: 58
DRG: 280 | End: 2018-08-21
Payer: COMMERCIAL

## 2018-08-21 LAB
ALBUMIN SERPL-MCNC: 2.8 G/DL (ref 3.4–5)
ALBUMIN SERPL-MCNC: 2.8 G/DL (ref 3.4–5)
ANION GAP SERPL CALCULATED.3IONS-SCNC: 11 MMOL/L (ref 3–16)
ANION GAP SERPL CALCULATED.3IONS-SCNC: 12 MMOL/L (ref 3–16)
BASOPHILS ABSOLUTE: 0.2 K/UL (ref 0–0.2)
BASOPHILS RELATIVE PERCENT: 1.4 %
BUN BLDV-MCNC: 5 MG/DL (ref 7–20)
BUN BLDV-MCNC: 6 MG/DL (ref 7–20)
CALCIUM SERPL-MCNC: 8.4 MG/DL (ref 8.3–10.6)
CALCIUM SERPL-MCNC: 9 MG/DL (ref 8.3–10.6)
CHLORIDE BLD-SCNC: 101 MMOL/L (ref 99–110)
CHLORIDE BLD-SCNC: 102 MMOL/L (ref 99–110)
CO2: 14 MMOL/L (ref 21–32)
CO2: 18 MMOL/L (ref 21–32)
CREAT SERPL-MCNC: <0.5 MG/DL (ref 0.9–1.3)
CREAT SERPL-MCNC: <0.5 MG/DL (ref 0.9–1.3)
EOSINOPHILS ABSOLUTE: 0.5 K/UL (ref 0–0.6)
EOSINOPHILS RELATIVE PERCENT: 3 %
GFR AFRICAN AMERICAN: >60
GFR AFRICAN AMERICAN: >60
GFR NON-AFRICAN AMERICAN: >60
GFR NON-AFRICAN AMERICAN: >60
GLUCOSE BLD-MCNC: 112 MG/DL (ref 70–99)
GLUCOSE BLD-MCNC: 83 MG/DL (ref 70–99)
HCT VFR BLD CALC: 24.1 % (ref 40.5–52.5)
HEMOGLOBIN: 8.3 G/DL (ref 13.5–17.5)
INR BLD: 2.15 (ref 0.86–1.14)
LYMPHOCYTES ABSOLUTE: 2 K/UL (ref 1–5.1)
LYMPHOCYTES RELATIVE PERCENT: 12.3 %
MAGNESIUM: 2 MG/DL (ref 1.8–2.4)
MCH RBC QN AUTO: 35.8 PG (ref 26–34)
MCHC RBC AUTO-ENTMCNC: 34.3 G/DL (ref 31–36)
MCV RBC AUTO: 104.3 FL (ref 80–100)
MONOCYTES ABSOLUTE: 2 K/UL (ref 0–1.3)
MONOCYTES RELATIVE PERCENT: 12.3 %
NEUTROPHILS ABSOLUTE: 11.8 K/UL (ref 1.7–7.7)
NEUTROPHILS RELATIVE PERCENT: 71 %
PDW BLD-RTO: 14.6 % (ref 12.4–15.4)
PHOSPHORUS: 1.5 MG/DL (ref 2.5–4.9)
PHOSPHORUS: 1.8 MG/DL (ref 2.5–4.9)
PLATELET # BLD: 318 K/UL (ref 135–450)
PMV BLD AUTO: 8.1 FL (ref 5–10.5)
POTASSIUM SERPL-SCNC: 4.2 MMOL/L (ref 3.5–5.1)
POTASSIUM SERPL-SCNC: 4.5 MMOL/L (ref 3.5–5.1)
PROTHROMBIN TIME: 24.5 SEC (ref 9.8–13)
RBC # BLD: 2.31 M/UL (ref 4.2–5.9)
SODIUM BLD-SCNC: 128 MMOL/L (ref 136–145)
SODIUM BLD-SCNC: 130 MMOL/L (ref 136–145)
WBC # BLD: 16.6 K/UL (ref 4–11)

## 2018-08-21 PROCEDURE — 6370000000 HC RX 637 (ALT 250 FOR IP): Performed by: STUDENT IN AN ORGANIZED HEALTH CARE EDUCATION/TRAINING PROGRAM

## 2018-08-21 PROCEDURE — 85610 PROTHROMBIN TIME: CPT

## 2018-08-21 PROCEDURE — 1200000000 HC SEMI PRIVATE

## 2018-08-21 PROCEDURE — 83735 ASSAY OF MAGNESIUM: CPT

## 2018-08-21 PROCEDURE — 71260 CT THORAX DX C+: CPT

## 2018-08-21 PROCEDURE — 2580000003 HC RX 258: Performed by: STUDENT IN AN ORGANIZED HEALTH CARE EDUCATION/TRAINING PROGRAM

## 2018-08-21 PROCEDURE — 85025 COMPLETE CBC W/AUTO DIFF WBC: CPT

## 2018-08-21 PROCEDURE — 80069 RENAL FUNCTION PANEL: CPT

## 2018-08-21 PROCEDURE — 76882 US LMTD JT/FCL EVL NVASC XTR: CPT

## 2018-08-21 PROCEDURE — 36415 COLL VENOUS BLD VENIPUNCTURE: CPT

## 2018-08-21 PROCEDURE — 97530 THERAPEUTIC ACTIVITIES: CPT

## 2018-08-21 PROCEDURE — 6360000004 HC RX CONTRAST MEDICATION: Performed by: STUDENT IN AN ORGANIZED HEALTH CARE EDUCATION/TRAINING PROGRAM

## 2018-08-21 PROCEDURE — 97535 SELF CARE MNGMENT TRAINING: CPT

## 2018-08-21 PROCEDURE — 99232 SBSQ HOSP IP/OBS MODERATE 35: CPT | Performed by: HOSPITALIST

## 2018-08-21 PROCEDURE — 99222 1ST HOSP IP/OBS MODERATE 55: CPT | Performed by: SURGERY

## 2018-08-21 RX ORDER — MIRTAZAPINE 15 MG/1
7.5 TABLET, FILM COATED ORAL NIGHTLY
Status: DISCONTINUED | OUTPATIENT
Start: 2018-08-21 | End: 2018-08-29 | Stop reason: HOSPADM

## 2018-08-21 RX ORDER — ZOLPIDEM TARTRATE 5 MG/1
5 TABLET ORAL NIGHTLY PRN
Status: DISCONTINUED | OUTPATIENT
Start: 2018-08-21 | End: 2018-08-21

## 2018-08-21 RX ORDER — TRISODIUM CITRATE DIHYDRATE AND CITRIC ACID MONOHYDRATE 500; 334 MG/5ML; MG/5ML
30 SOLUTION ORAL 2 TIMES DAILY
Status: DISCONTINUED | OUTPATIENT
Start: 2018-08-21 | End: 2018-08-29 | Stop reason: HOSPADM

## 2018-08-21 RX ORDER — PHYTONADIONE 5 MG/1
5 TABLET ORAL ONCE
Status: COMPLETED | OUTPATIENT
Start: 2018-08-21 | End: 2018-08-21

## 2018-08-21 RX ORDER — TRISODIUM CITRATE DIHYDRATE AND CITRIC ACID MONOHYDRATE 500; 334 MG/5ML; MG/5ML
15 SOLUTION ORAL 2 TIMES DAILY
Status: DISCONTINUED | OUTPATIENT
Start: 2018-08-21 | End: 2018-08-21

## 2018-08-21 RX ADMIN — PANTOPRAZOLE SODIUM 40 MG: 40 TABLET, DELAYED RELEASE ORAL at 06:40

## 2018-08-21 RX ADMIN — POTASSIUM PHOSPHATE, MONOBASIC 250 MG: 500 TABLET, SOLUBLE ORAL at 20:46

## 2018-08-21 RX ADMIN — POTASSIUM PHOSPHATE, MONOBASIC 250 MG: 500 TABLET, SOLUBLE ORAL at 10:22

## 2018-08-21 RX ADMIN — MIRTAZAPINE 7.5 MG: 15 TABLET, FILM COATED ORAL at 20:46

## 2018-08-21 RX ADMIN — RIFAXIMIN 550 MG: 550 TABLET ORAL at 20:46

## 2018-08-21 RX ADMIN — Medication 10 ML: at 20:46

## 2018-08-21 RX ADMIN — POTASSIUM PHOSPHATE, MONOBASIC 250 MG: 500 TABLET, SOLUBLE ORAL at 16:10

## 2018-08-21 RX ADMIN — RIFAXIMIN 550 MG: 550 TABLET ORAL at 08:20

## 2018-08-21 RX ADMIN — SODIUM CITRATE AND CITRIC ACID MONOHYDRATE 30 ML: 500; 334 SOLUTION ORAL at 12:38

## 2018-08-21 RX ADMIN — IOPAMIDOL 80 ML: 755 INJECTION, SOLUTION INTRAVENOUS at 12:23

## 2018-08-21 RX ADMIN — PHYTONADIONE 5 MG: 5 TABLET ORAL at 16:10

## 2018-08-21 RX ADMIN — Medication 10 ML: at 08:21

## 2018-08-21 ASSESSMENT — PAIN SCALES - GENERAL: PAINLEVEL_OUTOF10: 0

## 2018-08-21 NOTE — CONSULTS
measuring 15 cm by 12 cm mobile over right scapula extending under the arm. Fluctuant, non tender to palpation, tender to movement, No discoloration. Small fluctuant area over left scapula.   Less tender to movement  LUNGS:  No increased work of breathing, good air exchange, clear to auscultation bilaterally, no crackles or wheezing  CARDIOVASCULAR:  Normal apical impulse, regular rate and rhythm, normal S1 and S2, no S3 or S4, and no murmur noted  ABDOMEN:  abd round, soft, non-tender  EXT:  No edema      DATA:    CBC:   Lab Results   Component Value Date    WBC 16.6 08/21/2018    RBC 2.31 08/21/2018    HGB 8.3 08/21/2018    HCT 24.1 08/21/2018    .3 08/21/2018    MCH 35.8 08/21/2018    MCHC 34.3 08/21/2018    RDW 14.6 08/21/2018     08/21/2018    MPV 8.1 08/21/2018     CMP:    Lab Results   Component Value Date     08/21/2018    K 4.5 08/21/2018    K 4.9 08/18/2018     08/21/2018    CO2 14 08/21/2018    BUN 5 08/21/2018    CREATININE <0.5 08/21/2018    GFRAA >60 08/21/2018    AGRATIO 0.7 08/18/2018    LABGLOM >60 08/21/2018    GLUCOSE 83 08/21/2018    PROT 8.1 08/18/2018    LABALBU 2.8 08/21/2018    CALCIUM 8.4 08/21/2018    BILITOT 10.7 08/18/2018    ALKPHOS 195 08/18/2018     08/18/2018    ALT 25 08/18/2018     BMP:    Lab Results   Component Value Date     08/21/2018    K 4.5 08/21/2018    K 4.9 08/18/2018     08/21/2018    CO2 14 08/21/2018    BUN 5 08/21/2018    LABALBU 2.8 08/21/2018    CREATININE <0.5 08/21/2018    CALCIUM 8.4 08/21/2018    GFRAA >60 08/21/2018    LABGLOM >60 08/21/2018    GLUCOSE 83 08/21/2018     Hepatic Function Panel:    Lab Results   Component Value Date    ALKPHOS 195 08/18/2018    ALT 25 08/18/2018     08/18/2018    PROT 8.1 08/18/2018    BILITOT 10.7 08/18/2018    BILIDIR >10.0 06/07/2018    IBILI see below 06/07/2018    LABALBU 2.8 08/21/2018     Magnesium:    Lab Results   Component Value Date    MG 2.00 08/21/2018

## 2018-08-21 NOTE — PROGRESS NOTES
Pt has complained of the IV channel being too loud throughout this shift. Pt claims the noise has kept him up all night. This RN as well as two other nurses have changed IV channels on the pump and attempted various routes to eliminate what noise the patient reports hearing. Please note, the channel is making the usual sound of the pump sending fluid through the mechanism. Pt now refuses to allow fluids to continue. Fluids have been disconnected from patient. His IV remains intact to left AC. MD has been made aware.

## 2018-08-21 NOTE — CARE COORDINATION
CM met with patient at bedside to see about additional requests for referrals for SNF :  He is not sure and would like something close by here, , reviewed list with him and made referrals to this vicinity :  And requested me to speak with his Mother , CM called Pierre   [phone] to discuss and she stated she will be here shortly and CM left [facility] in room at bedside for her to review , will follow up as [facility] informed we need to get Pre cert in timely manner to no delay his discharge when he is medically ready to transfer. Electronically signed by Nelly Payne RN on 8/21/2018 at 11:21 AM      Patient's mother a bedside and worried about missing appt tomorrow for poss liver transplant  , she does not know the name or number of the appt . CM called Dr Johnie Ahuja office [phone] who confirmed it was at [address] [place] [address] [employer], CM called and spoke with Montrell Lyons confirmed his appt was for tomorrow at 1300 and CM informed her of the circumstances of his admission and he can make it what ids the follow up to re schedule,  She was checking with [facility] [place] and going to call this CM back.  , ( this appt was an assessment to see if he was eligible for transplant:   Informed her he could come to appt once he is d/c 'd to SNF and attend a follow up appt then , awaiting return call back. Electronically signed by Nelly Payne RN on 8/21/2018 at 3:47 PM    Mother and son agreed on referral to [facility] and [facility]   Referrals faxed  , awaiting determination . [facility] is reviewing and will have a bed available this weekend . Cont to follow and will need     CM also spoke with regards to appt  At [facility] , it is just with Christi Varghese , to do an assessment and she will call tomorrow to follow up and reschedule .       Electronically signed by Nelly Payne RN on 8/21/2018 at 4:54 PM

## 2018-08-22 LAB
ALBUMIN SERPL-MCNC: 2.6 G/DL (ref 3.4–5)
ALP BLD-CCNC: 212 U/L (ref 40–129)
ALT SERPL-CCNC: 18 U/L (ref 10–40)
ANION GAP SERPL CALCULATED.3IONS-SCNC: 10 MMOL/L (ref 3–16)
AST SERPL-CCNC: 68 U/L (ref 15–37)
BASOPHILS ABSOLUTE: 0.1 K/UL (ref 0–0.2)
BASOPHILS RELATIVE PERCENT: 0.9 %
BILIRUB SERPL-MCNC: 8.3 MG/DL (ref 0–1)
BILIRUBIN DIRECT: 4.5 MG/DL (ref 0–0.3)
BILIRUBIN, INDIRECT: 3.8 MG/DL (ref 0–1)
BUN BLDV-MCNC: 6 MG/DL (ref 7–20)
CALCIUM SERPL-MCNC: 8.8 MG/DL (ref 8.3–10.6)
CHLORIDE BLD-SCNC: 101 MMOL/L (ref 99–110)
CO2: 18 MMOL/L (ref 21–32)
CREAT SERPL-MCNC: <0.5 MG/DL (ref 0.9–1.3)
EOSINOPHILS ABSOLUTE: 0.6 K/UL (ref 0–0.6)
EOSINOPHILS RELATIVE PERCENT: 3.5 %
GFR AFRICAN AMERICAN: >60
GFR NON-AFRICAN AMERICAN: >60
GLUCOSE BLD-MCNC: 101 MG/DL (ref 70–99)
HCT VFR BLD CALC: 21.3 % (ref 40.5–52.5)
HEMOGLOBIN: 7.3 G/DL (ref 13.5–17.5)
INR BLD: 2.16 (ref 0.86–1.14)
LYMPHOCYTES ABSOLUTE: 1.9 K/UL (ref 1–5.1)
LYMPHOCYTES RELATIVE PERCENT: 12.1 %
MCH RBC QN AUTO: 35.8 PG (ref 26–34)
MCHC RBC AUTO-ENTMCNC: 34.2 G/DL (ref 31–36)
MCV RBC AUTO: 104.6 FL (ref 80–100)
MONOCYTES ABSOLUTE: 2.1 K/UL (ref 0–1.3)
MONOCYTES RELATIVE PERCENT: 13.3 %
NEUTROPHILS ABSOLUTE: 11.1 K/UL (ref 1.7–7.7)
NEUTROPHILS RELATIVE PERCENT: 70.2 %
PDW BLD-RTO: 14.2 % (ref 12.4–15.4)
PHOSPHORUS: 1.9 MG/DL (ref 2.5–4.9)
PLATELET # BLD: 340 K/UL (ref 135–450)
PMV BLD AUTO: 7.9 FL (ref 5–10.5)
POTASSIUM SERPL-SCNC: 4.1 MMOL/L (ref 3.5–5.1)
PROTHROMBIN TIME: 24.6 SEC (ref 9.8–13)
RBC # BLD: 2.04 M/UL (ref 4.2–5.9)
SODIUM BLD-SCNC: 129 MMOL/L (ref 136–145)
TOTAL PROTEIN: 6.7 G/DL (ref 6.4–8.2)
WBC # BLD: 15.8 K/UL (ref 4–11)

## 2018-08-22 PROCEDURE — 85025 COMPLETE CBC W/AUTO DIFF WBC: CPT

## 2018-08-22 PROCEDURE — 6370000000 HC RX 637 (ALT 250 FOR IP): Performed by: STUDENT IN AN ORGANIZED HEALTH CARE EDUCATION/TRAINING PROGRAM

## 2018-08-22 PROCEDURE — 1200000000 HC SEMI PRIVATE

## 2018-08-22 PROCEDURE — 99232 SBSQ HOSP IP/OBS MODERATE 35: CPT | Performed by: HOSPITALIST

## 2018-08-22 PROCEDURE — 85610 PROTHROMBIN TIME: CPT

## 2018-08-22 PROCEDURE — 2580000003 HC RX 258: Performed by: STUDENT IN AN ORGANIZED HEALTH CARE EDUCATION/TRAINING PROGRAM

## 2018-08-22 PROCEDURE — 84100 ASSAY OF PHOSPHORUS: CPT

## 2018-08-22 PROCEDURE — 36415 COLL VENOUS BLD VENIPUNCTURE: CPT

## 2018-08-22 PROCEDURE — 80076 HEPATIC FUNCTION PANEL: CPT

## 2018-08-22 PROCEDURE — 99231 SBSQ HOSP IP/OBS SF/LOW 25: CPT | Performed by: SURGERY

## 2018-08-22 PROCEDURE — 80048 BASIC METABOLIC PNL TOTAL CA: CPT

## 2018-08-22 RX ORDER — ZOLPIDEM TARTRATE 5 MG/1
5 TABLET ORAL NIGHTLY PRN
Status: DISCONTINUED | OUTPATIENT
Start: 2018-08-22 | End: 2018-08-28

## 2018-08-22 RX ORDER — PHYTONADIONE 5 MG/1
5 TABLET ORAL ONCE
Status: COMPLETED | OUTPATIENT
Start: 2018-08-22 | End: 2018-08-22

## 2018-08-22 RX ADMIN — RIFAXIMIN 550 MG: 550 TABLET ORAL at 21:31

## 2018-08-22 RX ADMIN — PANTOPRAZOLE SODIUM 40 MG: 40 TABLET, DELAYED RELEASE ORAL at 05:49

## 2018-08-22 RX ADMIN — Medication 10 ML: at 21:33

## 2018-08-22 RX ADMIN — RIFAXIMIN 550 MG: 550 TABLET ORAL at 11:59

## 2018-08-22 RX ADMIN — PHYTONADIONE 5 MG: 5 TABLET ORAL at 11:59

## 2018-08-22 RX ADMIN — ZOLPIDEM TARTRATE 5 MG: 5 TABLET ORAL at 21:31

## 2018-08-22 RX ADMIN — Medication 10 ML: at 12:00

## 2018-08-22 RX ADMIN — SODIUM CITRATE AND CITRIC ACID MONOHYDRATE 30 ML: 500; 334 SOLUTION ORAL at 21:31

## 2018-08-22 RX ADMIN — MIRTAZAPINE 7.5 MG: 15 TABLET, FILM COATED ORAL at 21:31

## 2018-08-22 ASSESSMENT — PAIN DESCRIPTION - PAIN TYPE: TYPE: CHRONIC PAIN

## 2018-08-22 ASSESSMENT — PAIN DESCRIPTION - LOCATION: LOCATION: BACK;GENERALIZED

## 2018-08-22 ASSESSMENT — PAIN SCALES - GENERAL
PAINLEVEL_OUTOF10: 0
PAINLEVEL_OUTOF10: 0
PAINLEVEL_OUTOF10: 8

## 2018-08-22 ASSESSMENT — PAIN DESCRIPTION - PROGRESSION: CLINICAL_PROGRESSION: NOT CHANGED

## 2018-08-22 ASSESSMENT — PAIN DESCRIPTION - DESCRIPTORS: DESCRIPTORS: ACHING

## 2018-08-22 ASSESSMENT — PAIN DESCRIPTION - ONSET: ONSET: ON-GOING

## 2018-08-22 ASSESSMENT — PAIN DESCRIPTION - FREQUENCY: FREQUENCY: INTERMITTENT

## 2018-08-22 NOTE — PROGRESS NOTES
72 hours. Recent Labs      08/21/18   1855   INR  2.15*      No results for input(s): CKTOTAL, CKMB, CKMBINDEX, TROPONINI in the last 72 hours. ASSESSMENT/PLAN: 61year old male with cirrhosis and failure to thrive now with mass to right shoulder that is most likely hematoma related to falls.  CT is consistent with correlating fractured ribs with mass locations    - No surgical intervention required at this time  - Recommend correcting INR and continue fall risk precautions    Rea Gaytan MD PGY-1  General Surgery Resident  08/22/18  5:50 AM  [phone]

## 2018-08-22 NOTE — PROGRESS NOTES
Occupational Mikal Patel 23 contacted prior to therapy session, nursing stated, \"You can see him, it would be good to get him up, but he's been in pain so I don't know if he will. \" Pt refused therapy stating, \"I've already been up today. I don't need to go into the bathroom I already washed up and I don't want to walk in the rosa. \" Continue with [address] [facility], [address] [facility]

## 2018-08-22 NOTE — PROGRESS NOTES
Cardiovascular: Regular rate & rhythm and normal heart sounds.  Exam reveals no gallop and no friction rub.  No murmur heard. Pulmonary/Chest: Effort normal and breath sounds normal. No respiratory distress. He has no wheezes. He has no rales. Abdominal: Soft. Bowel sounds are normal. He exhibits no distension. There is no tenderness. There is no rebound and no guarding. Musculoskeletal: He exhibits no edema or tenderness. Swelling right shoulder blade- non tender, freely moving. No erythema or ecchymosis. Neurological: He is alert and oriented to person, place, and time. GCS score is 15. Skin: Skin is warm and dry. No erythema. No results for input(s): PHART, ABD0UHS, PO2ART in the last 72 hours. DATA:       Labs  CBC:   Recent Labs      08/20/18   0547  08/21/18   0720  08/22/18   0700   WBC  15.8*  16.6*  15.8*   HGB  7.6*  8.3*  7.3*   HCT  22.6*  24.1*  21.3*   PLT  328  318  340       BMP:   Recent Labs      08/21/18   0720  08/21/18   1856  08/22/18   0700   NA  128*  130*  129*   K  4.5  4.2  4.1   CL  102  101  101   CO2  14*  18*  18*   BUN  5*  6*  6*   CREATININE  <0.5*  <0.5*  <0.5*   GLUCOSE  83  112*  101*     LFT's:   Recent Labs      08/22/18   0700   AST  68*   ALT  18   BILITOT  8.3*   ALKPHOS  212*     Troponin: No results for input(s): TROPONINI in the last 72 hours. BNP: No results for input(s): BNP in the last 72 hours. INR:   Recent Labs      08/21/18   1855  08/22/18   0700   INR  2.15*  2.16*     Lipids: No results for input(s): CHOL, HDL in the last 72 hours. Invalid input(s): LDLCALCU  CK: No results for input(s): CKTOTAL in the last 72 hours.     Urinalysis:  Lab Results   Component Value Date    NITRU Negative 08/18/2018    WBCUA 0-2 08/18/2018    BACTERIA Rare 08/18/2018    RBCUA 0-2 08/18/2018    BLOODU Negative 08/18/2018    SPECGRAV 1.020 08/18/2018    GLUCOSEU Negative 08/18/2018       Radiology:  CT CHEST W CONTRAST   Final Result      Bilateral recent rib fractures right posterolateral seventh rib and left posterior sixth and seventh ribs. Large multilocular fluid collection lateral to right costal margin anterior inferior to right scapula and smaller multilocular fluid collection lateral to left costal margin inferior to left scapula both most likely hematomas. Chronic subsegmental atelectasis or scarring right lower lobe. Resolution of prior left lower lobe consolidation with atelectasis or residual pneumonia remaining. Decrease in size of prior moderate size left pleural effusion with a small left pleural effusion remaining. US EXTREMITY RIGHT NON VASC LIMITED   Final Result      Large solid mass right infrascapular soft tissues corresponding to palpable finding. In absence of right infrascapular soft tissue mass on CT chest 3 months prior and in clinical information of fall on CT head 8/18/2018, question if the mass is a large hematoma although a rapidly growing neoplasm still is a consideration. Suggest initial further evaluation with a CT of chest (chest wall) with IV contrast.            CT Head WO Contrast   Final Result      1. No findings for acute intracranial abnormality. 2.  Right frontal scalp edema without underlying skull fracture. XR CHEST STANDARD (2 VW)   Final Result      1. No findings for acute cardiopulmonary disease. ASSESSMENT AND PLAN:   Elena Dominguez a 61 y. o. male w/ hx of alcoholic liver disease, HTN and HLD p/w failure to thrive.      Failure to thrive - patient is tolerating Ensure supplements well. Feels he is eating a little better. Reports difficulty sleeping. Patient refusing IVF.    - dysphagia II mechanically altered diet per SLP recs  - PT/OT   - continue Ensure Enlive, Ensure clear TID  - Palliative care consulted   - social work consulted - will need SNF  - will try Remeron for appetite stimulation   - will add patient's home Ambien back for sleep      Non-Anion Gap Metabolic Acidosis - CO2 uptrending at 18 today. - continue bicitra 30 mL BID   - daily renal panel     End Stage Liver Disease - MELD score 29 on admission. 2/2 alcohol abuse, patient reports he has not drank in last 7 months but has not been attending [place] [address] meetings. Recent banding for esophageal varices on 8/16. Tbili downtrending.   - continue rifaximin 550mg    - case management working on rescheduling patient's meeting with  Liver   - daily hepatic function panel  - daily INR    Traumatic Hematoma of R. Shoulder - Stable. Likely 2/2 fall previously. CT showed large multilocular fluid collection lateral to right costal margin anterior inferior to right scapula and smaller multilocular fluid collection lateral to left costal margin inferior to left scapula both most likely hematomas. General surgery with recs to correct INR but no surgical intervention at this time unless s/s of infection develop  - general surgery consulted  - Vit K 5 mg   - daily INR     Hyponatremia - likely hypovolemic hyponatremia 2/2 poor PO intake and dehydration. Na at 129 today. Patient refusing IVF. - encouraged fluid intake as patient refusing IVF  - daily renal panel     Severe Malnutrition - in setting of end stage liver disease  - dietary consulted  - continue Ensure Enlive and Ensure clear TID    Asymptomatic Acute on Chronic Anemia - Hgb at 7.3 from 8.3 yesterday. No s/s acute blood loss. Recent varical banding 8/16/18. Likely multifactorial including malnutrition and end stage liver disease.   - daily CBC   - transfuse if Hgb<7  - continue home protonix 40 mg PO BID      Hypophosphatemia - improving. At 1.9 today. - renal panel qd     Physical Deconditioning - 2/2 comorbid conditions.   - PT/OT     Hypokalemia, Hypomagnesemia - resolved  - daily renal panel     Rib Fractures (POA) - patient with previous falls. Still reporting some rib pain.  CT chest showed bilateral recent rib fractures right posterolateral seventh rib and

## 2018-08-23 ENCOUNTER — APPOINTMENT (OUTPATIENT)
Dept: GENERAL RADIOLOGY | Age: 58
DRG: 280 | End: 2018-08-23
Payer: COMMERCIAL

## 2018-08-23 LAB
ALBUMIN SERPL-MCNC: 2.6 G/DL (ref 3.4–5)
ALP BLD-CCNC: 199 U/L (ref 40–129)
ALT SERPL-CCNC: 16 U/L (ref 10–40)
ANION GAP SERPL CALCULATED.3IONS-SCNC: 12 MMOL/L (ref 3–16)
AST SERPL-CCNC: 63 U/L (ref 15–37)
BASOPHILS ABSOLUTE: 0.1 K/UL (ref 0–0.2)
BASOPHILS RELATIVE PERCENT: 0.9 %
BILIRUB SERPL-MCNC: 7.8 MG/DL (ref 0–1)
BILIRUBIN DIRECT: 4.2 MG/DL (ref 0–0.3)
BILIRUBIN, INDIRECT: 3.6 MG/DL (ref 0–1)
BUN BLDV-MCNC: 7 MG/DL (ref 7–20)
CALCIUM SERPL-MCNC: 9 MG/DL (ref 8.3–10.6)
CHLORIDE BLD-SCNC: 101 MMOL/L (ref 99–110)
CO2: 19 MMOL/L (ref 21–32)
CREAT SERPL-MCNC: <0.5 MG/DL (ref 0.9–1.3)
EOSINOPHILS ABSOLUTE: 0.5 K/UL (ref 0–0.6)
EOSINOPHILS RELATIVE PERCENT: 2.7 %
GFR AFRICAN AMERICAN: >60
GFR NON-AFRICAN AMERICAN: >60
GLUCOSE BLD-MCNC: 111 MG/DL (ref 70–99)
HCT VFR BLD CALC: 21.4 % (ref 40.5–52.5)
HEMOGLOBIN: 7.4 G/DL (ref 13.5–17.5)
INR BLD: 2.02 (ref 0.86–1.14)
LYMPHOCYTES ABSOLUTE: 1.9 K/UL (ref 1–5.1)
LYMPHOCYTES RELATIVE PERCENT: 11.6 %
MAGNESIUM: 1.8 MG/DL (ref 1.8–2.4)
MCH RBC QN AUTO: 36 PG (ref 26–34)
MCHC RBC AUTO-ENTMCNC: 34.5 G/DL (ref 31–36)
MCV RBC AUTO: 104.3 FL (ref 80–100)
MONOCYTES ABSOLUTE: 2.4 K/UL (ref 0–1.3)
MONOCYTES RELATIVE PERCENT: 14.5 %
NEUTROPHILS ABSOLUTE: 11.7 K/UL (ref 1.7–7.7)
NEUTROPHILS RELATIVE PERCENT: 70.3 %
PDW BLD-RTO: 14.4 % (ref 12.4–15.4)
PHOSPHORUS: 1.6 MG/DL (ref 2.5–4.9)
PLATELET # BLD: 349 K/UL (ref 135–450)
PMV BLD AUTO: 8 FL (ref 5–10.5)
POTASSIUM SERPL-SCNC: 4.2 MMOL/L (ref 3.5–5.1)
PROTHROMBIN TIME: 23 SEC (ref 9.8–13)
RBC # BLD: 2.05 M/UL (ref 4.2–5.9)
SODIUM BLD-SCNC: 132 MMOL/L (ref 136–145)
TOTAL PROTEIN: 6.7 G/DL (ref 6.4–8.2)
WBC # BLD: 16.6 K/UL (ref 4–11)

## 2018-08-23 PROCEDURE — 85610 PROTHROMBIN TIME: CPT

## 2018-08-23 PROCEDURE — 94799 UNLISTED PULMONARY SVC/PX: CPT

## 2018-08-23 PROCEDURE — 84100 ASSAY OF PHOSPHORUS: CPT

## 2018-08-23 PROCEDURE — 99231 SBSQ HOSP IP/OBS SF/LOW 25: CPT | Performed by: SURGERY

## 2018-08-23 PROCEDURE — 94760 N-INVAS EAR/PLS OXIMETRY 1: CPT

## 2018-08-23 PROCEDURE — 36415 COLL VENOUS BLD VENIPUNCTURE: CPT

## 2018-08-23 PROCEDURE — 85025 COMPLETE CBC W/AUTO DIFF WBC: CPT

## 2018-08-23 PROCEDURE — 80048 BASIC METABOLIC PNL TOTAL CA: CPT

## 2018-08-23 PROCEDURE — 71046 X-RAY EXAM CHEST 2 VIEWS: CPT

## 2018-08-23 PROCEDURE — 80076 HEPATIC FUNCTION PANEL: CPT

## 2018-08-23 PROCEDURE — 1200000000 HC SEMI PRIVATE

## 2018-08-23 PROCEDURE — 6370000000 HC RX 637 (ALT 250 FOR IP): Performed by: STUDENT IN AN ORGANIZED HEALTH CARE EDUCATION/TRAINING PROGRAM

## 2018-08-23 PROCEDURE — 2580000003 HC RX 258: Performed by: STUDENT IN AN ORGANIZED HEALTH CARE EDUCATION/TRAINING PROGRAM

## 2018-08-23 PROCEDURE — 97535 SELF CARE MNGMENT TRAINING: CPT

## 2018-08-23 PROCEDURE — 83735 ASSAY OF MAGNESIUM: CPT

## 2018-08-23 PROCEDURE — 99232 SBSQ HOSP IP/OBS MODERATE 35: CPT | Performed by: HOSPITALIST

## 2018-08-23 PROCEDURE — 97116 GAIT TRAINING THERAPY: CPT

## 2018-08-23 PROCEDURE — 94664 DEMO&/EVAL PT USE INHALER: CPT

## 2018-08-23 RX ORDER — PHYTONADIONE 5 MG/1
5 TABLET ORAL DAILY
Status: DISCONTINUED | OUTPATIENT
Start: 2018-08-23 | End: 2018-08-29 | Stop reason: HOSPADM

## 2018-08-23 RX ADMIN — ZOLPIDEM TARTRATE 5 MG: 5 TABLET ORAL at 22:14

## 2018-08-23 RX ADMIN — PHYTONADIONE 5 MG: 5 TABLET ORAL at 14:31

## 2018-08-23 RX ADMIN — MIRTAZAPINE 7.5 MG: 15 TABLET, FILM COATED ORAL at 22:13

## 2018-08-23 RX ADMIN — Medication 10 ML: at 22:14

## 2018-08-23 RX ADMIN — POTASSIUM PHOSPHATE, MONOBASIC 250 MG: 500 TABLET, SOLUBLE ORAL at 22:12

## 2018-08-23 RX ADMIN — RIFAXIMIN 550 MG: 550 TABLET ORAL at 09:11

## 2018-08-23 RX ADMIN — POTASSIUM PHOSPHATE, MONOBASIC 250 MG: 500 TABLET, SOLUBLE ORAL at 18:29

## 2018-08-23 RX ADMIN — PANTOPRAZOLE SODIUM 40 MG: 40 TABLET, DELAYED RELEASE ORAL at 06:37

## 2018-08-23 RX ADMIN — RIFAXIMIN 550 MG: 550 TABLET ORAL at 22:13

## 2018-08-23 RX ADMIN — Medication 10 ML: at 09:11

## 2018-08-23 RX ADMIN — POTASSIUM PHOSPHATE, MONOBASIC 250 MG: 500 TABLET, SOLUBLE ORAL at 11:40

## 2018-08-23 RX ADMIN — SODIUM CITRATE AND CITRIC ACID MONOHYDRATE 30 ML: 500; 334 SOLUTION ORAL at 22:11

## 2018-08-23 RX ADMIN — SODIUM CITRATE AND CITRIC ACID MONOHYDRATE 30 ML: 500; 334 SOLUTION ORAL at 09:11

## 2018-08-23 ASSESSMENT — PAIN DESCRIPTION - ORIENTATION: ORIENTATION: OUTER

## 2018-08-23 ASSESSMENT — PAIN SCALES - GENERAL
PAINLEVEL_OUTOF10: 8
PAINLEVEL_OUTOF10: 8

## 2018-08-23 ASSESSMENT — PAIN DESCRIPTION - PROGRESSION: CLINICAL_PROGRESSION: NOT CHANGED

## 2018-08-23 ASSESSMENT — PAIN DESCRIPTION - FREQUENCY: FREQUENCY: CONTINUOUS

## 2018-08-23 ASSESSMENT — PAIN DESCRIPTION - LOCATION: LOCATION: RIB CAGE

## 2018-08-23 ASSESSMENT — PAIN DESCRIPTION - ONSET: ONSET: ON-GOING

## 2018-08-23 ASSESSMENT — PAIN DESCRIPTION - DESCRIPTORS: DESCRIPTORS: ACHING

## 2018-08-23 ASSESSMENT — PAIN DESCRIPTION - PAIN TYPE: TYPE: ACUTE PAIN

## 2018-08-23 ASSESSMENT — ACTIVITIES OF DAILY LIVING (ADL): EFFECT OF PAIN ON DAILY ACTIVITIES: MOBILITY

## 2018-08-23 NOTE — PROGRESS NOTES
Surgery Daily Progress Note  Patient: Everett Hager    CC: Fatigue, pain in right shoulder    Subjective :  No acute events overnight. No changes in pain from yesterday. Patient remains afebrile with stable hemodynamics. ROS: A 12 point review of systems was conducted, significant findings as noted in HPI. All other systems negative. Objective :  Physical exam:    Vitals:    08/22/18 0840 08/22/18 1140 08/22/18 1654 08/23/18 0029   BP: 98/70 101/68 103/68 95/64   Pulse: 101 99 105 115   Resp: 18 16 16 16   Temp: 98.6 °F (37 °C) 98.7 °F (37.1 °C) 100 °F (37.8 °C) 99.1 °F (37.3 °C)   TempSrc: Oral Oral Oral Oral   SpO2: 100% 99% 100% 100%   Weight:       Height:           Infusions:   sodium chloride 75 mL/hr at 08/20/18 2044       I/O:I/O last 3 completed shifts: In: 12 [P.O.:960]  Out: -            Wt Readings from Last 1 Encounters:   08/18/18 114 lb 13.8 oz (52.1 kg)       CONSTITUTIONAL:  Awake, cachectic, and jaundiced  HEENT:  Two dendued areas to forehead unchanged  BACK:  Non-tender mass over right scapula protruding and extending under the arm. LUNGS: Normal effort, breathing room air, clear to auscultation bilaterally, no crackles or wheezing  CARDIOVASCULAR:  Regular rate and rhythm, no murmur, no gallops, no rubs  ABDOMEN: rotund abdomen, soft, non-tender, no peritoneal signs  EXTREMITIES:  No edema      LABS:   Recent Labs      08/21/18   0720  08/22/18   0700   WBC  16.6*  15.8*   HGB  8.3*  7.3*   HCT  24.1*  21.3*   MCV  104.3*  104.6*   PLT  318  340        Recent Labs      08/21/18   1856  08/22/18   0700   NA  130*  129*   K  4.2  4.1   CL  101  101   CO2  18*  18*   PHOS  1.8*  1.9*   BUN  6*  6*   CREATININE  <0.5*  <0.5*        Recent Labs      08/22/18   0700   AST  68*   ALT  18   BILIDIR  4.5*   BILITOT  8.3*   ALKPHOS  212*      No results for input(s): LIPASE, AMYLASE in the last 72 hours.      Recent Labs      08/21/18   1855  08/22/18   0700   PROT   --   6.7   INR  2.15*  2.16*

## 2018-08-23 NOTE — PROGRESS NOTES
Within Functional Limits     Objective   Bed mobility  Supine to Sit: Minimal assistance (Using rail from flat bed)  Scooting: Contact guard assistance     Transfers  Sit to Stand: Contact guard assistance  Stand to sit: Contact guard assistance     Ambulation  Ambulation?: Yes  Ambulation 1  Surface: level tile  Device: No Device  Assistance: Contact guard assistance;Minimal assistance  Quality of Gait: Slow & guarded, decreased step height B, pt is unsteady  Distance: 100 feet  Comments: Pt declining further gait due to fatigue     Balance  Sitting - Static: Good  Standing - Dynamic:  (CGA to min assist)    Assessment   Assessment: Pt with unsteady gait and presents as a fall risk. Pt would likely benefit from use of a wheeled walker. Rec continued inpt therapy at d/c to maximize pt's mobility. Will follow. Treatment Diagnosis: impaired functional mobility d/t liver failure  Prognosis: Good  Patient Education: role of PT, importance of OOB- pt verbalized understanding  REQUIRES PT FOLLOW UP: Yes  Activity Tolerance  Activity Tolerance: Patient Tolerated treatment well;Patient limited by fatigue     Goals  Short term goals  Time Frame for Short term goals: D/C  Short term goal 1: sit<->stand SBA  ONGOING  Short term goal 2: Amb >150 ft with/without AAD SBA  ONGOING  Patient Goals   Patient goals : to get stronger    Plan    Plan  Times per week: 2-5  Current Treatment Recommendations: Balance Training, Functional Mobility Training, Transfer Training, Gait Training, Safety Education & Training, Endurance Training  Safety Devices  Type of devices: Call light within reach, Nurse notified, Bed alarm in place, Left in bed     Therapy Time   Individual Concurrent Group Co-treatment   Time In 1210         Time Out 1225         Minutes 15           Timed Code Treatment Minutes:   15    Total Treatment Minutes:  15     If pt d/c'd prior to next treatment, this note serves as a discharge note.   [place], [address] [place] [address]

## 2018-08-23 NOTE — PLAN OF CARE
Problem: Nutrition  Goal: Optimal nutrition therapy  Outcome: Ongoing  Nutrition Problem: Severe malnutrition  Intervention: Food and/or Nutrient Delivery: Modify current diet, Start ONS  Nutritional Goals: Patient will tolerate diet and consume 75% or greater of all meals and supplements

## 2018-08-23 NOTE — PROGRESS NOTES
Internal Medicine  Progress Note  PGY-1    Hospital Day: 6                                                             Admit Date: 8/18/2018                                     PCP: Amy Olivares MD              Interval Hx: No acute events overnight. Daily Plan:  8/23/2018    Subjective: Patient seen and examined at bedside. Reports feeling better today. Slept better overnight. Drinking supplements but expressed interest in trying other foods today. Still with some rib pain but says it is tolerable. Denies fevers, chills, chest pain, shortness of breath, abdominal pain, n/v/d. Medications:    Scheduled Meds:   mirtazapine  7.5 mg Oral Nightly    citric acid-sodium citrate  30 mL Oral BID    rifaximin  550 mg Oral BID    pantoprazole  40 mg Oral Daily    sodium chloride  250 mL Intravenous Once    sodium chloride flush  10 mL Intravenous 2 times per day      Continuous Infusions:   sodium chloride 75 mL/hr at 08/20/18 2044     PRN Meds:zolpidem, melatonin, sodium chloride flush, magnesium hydroxide, ondansetron, acetaminophen    Allergies: No Known Allergies      Physical Exam:     Vitals: BP 93/65   Pulse 108   Temp 98.6 °F (37 °C) (Oral)   Resp 16   Ht 6' 1\" (1.854 m)   Wt 114 lb 13.8 oz (52.1 kg)   SpO2 100%   BMI 15.15 kg/m²     I/O:    Intake/Output Summary (Last 24 hours) at 08/23/18 1107  Last data filed at 08/23/18 5272   Gross per 24 hour   Intake             1440 ml   Output               30 ml   Net             1410 ml       Physical Exam  Constitutional: He is oriented to person, place, and time. Thin, very cachetic    Head: Normocephalic and atraumatic. Eyes: Pupils are equal, round, and reactive to light. EOM are normal. Right eye exhibits no discharge. Left eye exhibits no discharge. Scleral icterus is present.    Neck: Normal range of motion. Neck supple.    Cardiovascular: Tachycardic, regular rhythm and normal heart sounds.  Exam reveals no gallop and no friction rub.  No Eval Status: 21/24 (OT), 19/24 (PT)    I will discuss the patient with the senior resident and Uday Gaspar MD  -----------------------------  Jane Snowden MD  Internal Medicine Resident, PGY-1  Pager: [phone]    Addendum to Resident H& P/Progress note:  I have personally seen,examined and evaluated the patient. I have reviewed the current history, physical findings, labs and assessment and plan and agree with note as documented by resident MD ( Matthias Horn)  Hopefully, will be able to discharge the patient to [facility] tomorrow.     Uday Gaspar MD, FACP

## 2018-08-23 NOTE — PROGRESS NOTES
Orders: Dysphagia 2   · Oral Diet intake: 0%  · Oral Nutrition Supplement (ONS) Orders: Standard High Calorie Oral Supplement  · ONS intake: % (x5 per day )  · Anthropometric Measures:  · Ht: 6' 1\" (185.4 cm)   · Current Body Wt: 114 lb 13.8 oz (52.1 kg)  · % Weight Change: 20% loss,  10 weeks  · Ideal Body Wt: 184 lb (83.5 kg),   · BMI Classification: BMI <18.5 Underweight  · Comparative Standards (Estimated Nutrition Needs):  · Estimated Daily Total Kcal: 7476-8425  · Estimated Daily Protein (g): 62-73    Estimated Intake vs Estimated Needs: Intake Meets Needs (through ensures)    Nutrition Risk Level: Moderate    Nutrition Interventions:   Modify current diet, Start ONS  Continued Inpatient Monitoring    Nutrition Evaluation:   · Evaluation: Progressing toward goals   · Goals: Patient will tolerate diet and consume 75% or greater of all meals and supplements    · Monitoring: Meal Intake, Supplement Intake, Diet Tolerance, Pertinent Labs    See Adult Nutrition Doc Flowsheet for more detail.      Electronically signed by Arabella Castañeda RD, LD on 8/23/18 at 12:27 PM    ALOK DANIEL, [place]  Pager:  [phone]  Office:  [phone]

## 2018-08-23 NOTE — PROGRESS NOTES
Time Out 1527         Minutes 12         Timed Code Treatment Minutes: 12 Minutes    Total Treatment 41 Isiah Lucas, OTR/L [id number]

## 2018-08-24 LAB
ALBUMIN SERPL-MCNC: 2.8 G/DL (ref 3.4–5)
ALP BLD-CCNC: 226 U/L (ref 40–129)
ALT SERPL-CCNC: 17 U/L (ref 10–40)
ANION GAP SERPL CALCULATED.3IONS-SCNC: 11 MMOL/L (ref 3–16)
AST SERPL-CCNC: 66 U/L (ref 15–37)
BASOPHILS ABSOLUTE: 0.2 K/UL (ref 0–0.2)
BASOPHILS RELATIVE PERCENT: 1 %
BILIRUB SERPL-MCNC: 8.2 MG/DL (ref 0–1)
BILIRUBIN DIRECT: 4.4 MG/DL (ref 0–0.3)
BILIRUBIN, INDIRECT: 3.8 MG/DL (ref 0–1)
BUN BLDV-MCNC: 8 MG/DL (ref 7–20)
CALCIUM SERPL-MCNC: 9.4 MG/DL (ref 8.3–10.6)
CHLORIDE BLD-SCNC: 99 MMOL/L (ref 99–110)
CO2: 22 MMOL/L (ref 21–32)
CREAT SERPL-MCNC: 0.5 MG/DL (ref 0.9–1.3)
EOSINOPHILS ABSOLUTE: 1.7 K/UL (ref 0–0.6)
EOSINOPHILS RELATIVE PERCENT: 10 %
GFR AFRICAN AMERICAN: >60
GFR NON-AFRICAN AMERICAN: >60
GLUCOSE BLD-MCNC: 112 MG/DL (ref 70–99)
HCT VFR BLD CALC: 22.7 % (ref 40.5–52.5)
HEMOGLOBIN: 7.8 G/DL (ref 13.5–17.5)
INR BLD: 1.89 (ref 0.86–1.14)
LYMPHOCYTES ABSOLUTE: 1.7 K/UL (ref 1–5.1)
LYMPHOCYTES RELATIVE PERCENT: 10 %
MCH RBC QN AUTO: 36.3 PG (ref 26–34)
MCHC RBC AUTO-ENTMCNC: 34.4 G/DL (ref 31–36)
MCV RBC AUTO: 105.6 FL (ref 80–100)
METAMYELOCYTES RELATIVE PERCENT: 2 %
MONOCYTES ABSOLUTE: 1.4 K/UL (ref 0–1.3)
MONOCYTES RELATIVE PERCENT: 8 %
NEUTROPHILS ABSOLUTE: 12.2 K/UL (ref 1.7–7.7)
NEUTROPHILS RELATIVE PERCENT: 69 %
PDW BLD-RTO: 14.1 % (ref 12.4–15.4)
PHOSPHORUS: 1.8 MG/DL (ref 2.5–4.9)
PLATELET # BLD: 369 K/UL (ref 135–450)
PMV BLD AUTO: 8 FL (ref 5–10.5)
POTASSIUM SERPL-SCNC: 4.4 MMOL/L (ref 3.5–5.1)
PROTHROMBIN TIME: 21.6 SEC (ref 9.8–13)
RBC # BLD: 2.15 M/UL (ref 4.2–5.9)
SODIUM BLD-SCNC: 132 MMOL/L (ref 136–145)
TOTAL PROTEIN: 7.4 G/DL (ref 6.4–8.2)
WBC # BLD: 17.2 K/UL (ref 4–11)

## 2018-08-24 PROCEDURE — 84100 ASSAY OF PHOSPHORUS: CPT

## 2018-08-24 PROCEDURE — 6370000000 HC RX 637 (ALT 250 FOR IP): Performed by: STUDENT IN AN ORGANIZED HEALTH CARE EDUCATION/TRAINING PROGRAM

## 2018-08-24 PROCEDURE — 36415 COLL VENOUS BLD VENIPUNCTURE: CPT

## 2018-08-24 PROCEDURE — 2580000003 HC RX 258: Performed by: STUDENT IN AN ORGANIZED HEALTH CARE EDUCATION/TRAINING PROGRAM

## 2018-08-24 PROCEDURE — 80048 BASIC METABOLIC PNL TOTAL CA: CPT

## 2018-08-24 PROCEDURE — 80076 HEPATIC FUNCTION PANEL: CPT

## 2018-08-24 PROCEDURE — 94760 N-INVAS EAR/PLS OXIMETRY 1: CPT

## 2018-08-24 PROCEDURE — 99232 SBSQ HOSP IP/OBS MODERATE 35: CPT | Performed by: HOSPITALIST

## 2018-08-24 PROCEDURE — 1200000000 HC SEMI PRIVATE

## 2018-08-24 PROCEDURE — 85610 PROTHROMBIN TIME: CPT

## 2018-08-24 PROCEDURE — 85025 COMPLETE CBC W/AUTO DIFF WBC: CPT

## 2018-08-24 RX ORDER — FUROSEMIDE 20 MG/1
10 TABLET ORAL DAILY
Qty: 30 TABLET | Refills: 0 | Status: CANCELLED | OUTPATIENT
Start: 2018-08-24

## 2018-08-24 RX ORDER — FUROSEMIDE 20 MG/1
20 TABLET ORAL DAILY
Status: DISCONTINUED | OUTPATIENT
Start: 2018-08-24 | End: 2018-08-29 | Stop reason: HOSPADM

## 2018-08-24 RX ORDER — SPIRONOLACTONE 50 MG/1
50 TABLET, FILM COATED ORAL DAILY
Status: DISCONTINUED | OUTPATIENT
Start: 2018-08-24 | End: 2018-08-29 | Stop reason: HOSPADM

## 2018-08-24 RX ADMIN — POTASSIUM PHOSPHATE, MONOBASIC 250 MG: 500 TABLET, SOLUBLE ORAL at 09:26

## 2018-08-24 RX ADMIN — RIFAXIMIN 550 MG: 550 TABLET ORAL at 20:33

## 2018-08-24 RX ADMIN — Medication 10 ML: at 20:34

## 2018-08-24 RX ADMIN — SODIUM CITRATE AND CITRIC ACID MONOHYDRATE 30 ML: 500; 334 SOLUTION ORAL at 09:26

## 2018-08-24 RX ADMIN — PHYTONADIONE 5 MG: 5 TABLET ORAL at 09:26

## 2018-08-24 RX ADMIN — POTASSIUM PHOSPHATE, MONOBASIC 250 MG: 500 TABLET, SOLUBLE ORAL at 20:33

## 2018-08-24 RX ADMIN — SODIUM CITRATE AND CITRIC ACID MONOHYDRATE 30 ML: 500; 334 SOLUTION ORAL at 20:33

## 2018-08-24 RX ADMIN — ZOLPIDEM TARTRATE 5 MG: 5 TABLET ORAL at 20:41

## 2018-08-24 RX ADMIN — MIRTAZAPINE 7.5 MG: 15 TABLET, FILM COATED ORAL at 20:33

## 2018-08-24 RX ADMIN — Medication 10 ML: at 09:27

## 2018-08-24 RX ADMIN — PANTOPRAZOLE SODIUM 40 MG: 40 TABLET, DELAYED RELEASE ORAL at 05:00

## 2018-08-24 RX ADMIN — POTASSIUM PHOSPHATE, MONOBASIC 250 MG: 500 TABLET, SOLUBLE ORAL at 17:15

## 2018-08-24 RX ADMIN — FUROSEMIDE 20 MG: 20 TABLET ORAL at 14:22

## 2018-08-24 RX ADMIN — SPIRONOLACTONE 50 MG: 50 TABLET, FILM COATED ORAL at 14:22

## 2018-08-24 RX ADMIN — RIFAXIMIN 550 MG: 550 TABLET ORAL at 09:26

## 2018-08-24 ASSESSMENT — PAIN DESCRIPTION - LOCATION: LOCATION: BACK

## 2018-08-24 ASSESSMENT — PAIN SCALES - GENERAL
PAINLEVEL_OUTOF10: 0
PAINLEVEL_OUTOF10: 5

## 2018-08-24 ASSESSMENT — PAIN DESCRIPTION - PAIN TYPE: TYPE: CHRONIC PAIN

## 2018-08-24 NOTE — PLAN OF CARE
Problem: Falls - Risk of:  Goal: Will remain free from falls  Will remain free from falls   Outcome: Ongoing  At high risk for falls per Derrel Bream scale. Oriented x 4. Generally weak. Needed items in reach. Fall precautions in place. Problem: Fluid Volume - Imbalance:  Intervention: Assess signs and symptoms of dehydration  Sbp 89-96. . Oriented x 4. Has been alert. Voiding hazy dark lori urine. Taking po fluids well. Will continue to monitor.

## 2018-08-24 NOTE — PROGRESS NOTES
Internal Medicine PGY-1 Resident Progress Note        PCP: Saritha Griffin MD    Date of Admission: 8/18/2018    Chief Complaint: Failure to thrive         Subjective:     No events overnight. SNF placement not happening today, CM working on finding new facility. Otherwise, pt is eating well with normal diet today and is without complaints. Denies fever, chills, dyspnea, abdominal pain, melena, or hematochezia. Medications:  Reviewed      Scheduled Medications    phytonadione  5 mg Oral Daily    mirtazapine  7.5 mg Oral Nightly    citric acid-sodium citrate  30 mL Oral BID    rifaximin  550 mg Oral BID    pantoprazole  40 mg Oral Daily    sodium chloride  250 mL Intravenous Once    sodium chloride flush  10 mL Intravenous 2 times per day     PRN Meds: zolpidem, melatonin, sodium chloride flush, magnesium hydroxide, ondansetron, acetaminophen      Intake/Output Summary (Last 24 hours) at 08/24/18 1313  Last data filed at 08/24/18 0941   Gross per 24 hour   Intake             1560 ml   Output              350 ml   Net             1210 ml       Physical Exam Performed:    /72   Pulse 109   Temp 99.5 °F (37.5 °C) (Oral)   Resp 15   Ht 6' 1\" (1.854 m)   Wt 114 lb 13.8 oz (52.1 kg)   SpO2 100%   BMI 15.15 kg/m²     General appearance: No apparent distress, cachetic/   HEENT: Icteric sclera. Pupils equal, round, and reactive to light. Neck: Supple, with full range of motion. No jugular venous distention. Trachea midline. Respiratory:  Normal respiratory effort. Clear to auscultation, bilaterally without Rales/Wheezes/Rhonchi. Cardiovascular: Tachycardic with regular rhythm and normal S1/S2 without murmurs, rubs or gallops. Abdomen: Distended with fluid wave. Non-tender  Musculoskeletal: No clubbing, cyanosis or edema bilaterally. Full range of motion without deformity. Skin: Skin color, texture, turgor normal.  No rashes or lesions.   Neurologic:  Neurovascularly intact without any focal sensory/motor deficits. Cranial nerves: II-XII intact, grossly non-focal.  Psychiatric: Alert and oriented, thought content appropriate, normal insight  Capillary Refill: Brisk,< 3 seconds   Peripheral Pulses: +2 palpable, equal bilaterally       Labs:   Recent Labs      08/22/18   0700  08/23/18   0716  08/24/18   0508   WBC  15.8*  16.6*  17.2*   HGB  7.3*  7.4*  7.8*   HCT  21.3*  21.4*  22.7*   PLT  340  349  369     Recent Labs      08/22/18   0700  08/23/18   0716  08/24/18   0508   NA  129*  132*  132*   K  4.1  4.2  4.4   CL  101  101  99   CO2  18*  19*  22   BUN  6*  7  8   CREATININE  <0.5*  <0.5*  0.5*   CALCIUM  8.8  9.0  9.4   PHOS  1.9*  1.6*  1.8*     Recent Labs      08/22/18   0700  08/23/18   0716  08/24/18   0508   AST  68*  63*  66*   ALT  18  16  17   BILIDIR  4.5*  4.2*  4.4*   BILITOT  8.3*  7.8*  8.2*   ALKPHOS  212*  199*  226*     Recent Labs      08/22/18   0700  08/23/18   0716  08/24/18   0508   INR  2.16*  2.02*  1.89*     No results for input(s): CKTOTAL, TROPONINI in the last 72 hours. Urinalysis:    Lab Results   Component Value Date    NITRU Negative 08/18/2018    WBCUA 0-2 08/18/2018    BACTERIA Rare 08/18/2018    RBCUA 0-2 08/18/2018    BLOODU Negative 08/18/2018    SPECGRAV 1.020 08/18/2018    GLUCOSEU Negative 08/18/2018       Radiology:  XR CHEST STANDARD (2 VW)   Final Result   Low lung volumes with mild right basilar atelectasis and small pleural effusion. CT CHEST W CONTRAST   Final Result      Bilateral recent rib fractures right posterolateral seventh rib and left posterior sixth and seventh ribs. Large multilocular fluid collection lateral to right costal margin anterior inferior to right scapula and smaller multilocular fluid collection lateral to left costal margin inferior to left scapula both most likely hematomas. Chronic subsegmental atelectasis or scarring right lower lobe.       Resolution of prior left lower lobe consolidation with Stable. - general surgery consulted and rec no surgical intervention   - Vit K 5 mg qd for coagulopathy  - INR daily, today 1.89.     Asymptomatic Chronic Anemia - Hgb stable at 7.8 today. No s/s acute blood loss. Recent varical banding 8/16/18. Likely multifactorial including malnutrition and end stage liver disease.   - daily CBC   - transfuse if Hgb<7  - continue home protonix 40 mg PO QD      Hypophosphatemia - improving. 1.8 today  - Kphos 250 mg QID x 4 doses  - Daily BMP     Physical Deconditioning - 2/2 comorbid conditions.   - PT/OT     Rib Fractures (POA) - patient with previous falls. Still reporting some rib pain but manageable per patient. CT chest showed bilateral recent rib fractures right posterolateral seventh rib and left posterior sixth and seventh ribs.      Code Status:  Limited  FEN: General Diet  Dietary Nutrition Supplements: Standard High Calorie Oral Supplement  PPX: SCDs  DISPO: F - awaiting SNF placement  PT/OT Eval:    - OT >> 21/24 w/ rec for 3-5 sessions/wk   - PT >> 18/24 w/ rec for 2-3 sessions/wk    I will discuss the patient with the senior resident and MD Kev Sotelo, DO  Internal Medicine, PGY1  Pager: [phone]    Addendum to Resident H& P/Progress note:  I have personally seen,examined and evaluated the patient. I have reviewed the current history, physical findings, labs and assessment and plan and agree with note as documented by resident MD ( )  Awaiting for SNF placement.     Yoli Carrera MD, FACP

## 2018-08-24 NOTE — DISCHARGE SUMMARY
08/24/2018    .6 08/24/2018     08/24/2018     08/24/2018    K 4.4 08/24/2018    K 4.9 08/18/2018    CL 99 08/24/2018    CO2 22 08/24/2018    BUN 8 08/24/2018    CREATININE 0.5 08/24/2018    CALCIUM 9.4 08/24/2018    PHOS 1.8 08/24/2018    ALKPHOS 226 08/24/2018    ALT 17 08/24/2018    AST 66 08/24/2018    BILITOT 8.2 08/24/2018    BILIDIR 4.4 08/24/2018    LABALBU 2.8 08/24/2018    LDLCALC 196 06/14/2016    TRIG 125 06/14/2016     Lab Results   Component Value Date    INR 1.89 (H) 08/24/2018    INR 2.02 (H) 08/23/2018    INR 2.16 (H) 08/22/2018       Radiology:  XR CHEST STANDARD (2 VW)   Final Result   Low lung volumes with mild right basilar atelectasis and small pleural effusion. CT CHEST W CONTRAST   Final Result      Bilateral recent rib fractures right posterolateral seventh rib and left posterior sixth and seventh ribs. Large multilocular fluid collection lateral to right costal margin anterior inferior to right scapula and smaller multilocular fluid collection lateral to left costal margin inferior to left scapula both most likely hematomas. Chronic subsegmental atelectasis or scarring right lower lobe. Resolution of prior left lower lobe consolidation with atelectasis or residual pneumonia remaining. Decrease in size of prior moderate size left pleural effusion with a small left pleural effusion remaining. US EXTREMITY RIGHT NON VASC LIMITED   Final Result      Large solid mass right infrascapular soft tissues corresponding to palpable finding. In absence of right infrascapular soft tissue mass on CT chest 3 months prior and in clinical information of fall on CT head 8/18/2018, question if the mass is a large hematoma although a rapidly growing neoplasm still is a consideration. Suggest initial further evaluation with a CT of chest (chest wall) with IV contrast.            CT Head WO Contrast   Final Result      1.   No findings for acute intracranial abnormality. 2.  Right frontal scalp edema without underlying skull fracture. XR CHEST STANDARD (2 VW)   Final Result      1. No findings for acute cardiopulmonary disease. Discharge Medications:   Current Discharge Medication List        Current Discharge Medication List        Current Discharge Medication List      CONTINUE these medications which have NOT CHANGED    Details   furosemide (LASIX) 20 MG tablet Take 1 tablet by mouth daily      spironolactone (ALDACTONE) 50 MG tablet Take 1 tablet by mouth daily      zolpidem (AMBIEN) 5 MG tablet Take 1 tablet by mouth nightly as needed for Sleep for up to 14 days. Rose Marie Loft: 30 tablet, Refills: 5    Associated Diagnoses: Liver disease, chronic, due to alcohol (HCC)      potassium chloride (KLOR-CON) 10 MEQ extended release tablet       rifaximin (XIFAXAN) 550 MG tablet Take 1 tablet by mouth 2 times daily  Qty: 42 tablet, Refills: 0      pantoprazole (PROTONIX) 40 MG tablet Take 1 tablet by mouth daily  Qty: 30 tablet, Refills: 3           Current Discharge Medication List          Follow-up appointments:  two weeks    Provider Follow-up:    PCP Suyapa Christopher MD.     Condition at Discharge:  Kassandra Console    The patient was seen and examined on day of discharge and this discharge summary is in conjunction with any daily progress note from day of discharge. Time spent on discharge is more than 30 minutes in the examination, evaluation, counseling and review of medications and discharge plan. Signed:    Alberto Dueñas DO  Internal Medicine, PGY1  Pager: [phone]     8/24/2018      Thank you Suyapa Christopher MD for the opportunity to be involved in this patient's care.

## 2018-08-25 LAB
ALBUMIN SERPL-MCNC: 2.7 G/DL (ref 3.4–5)
ALP BLD-CCNC: 222 U/L (ref 40–129)
ALT SERPL-CCNC: 15 U/L (ref 10–40)
ANION GAP SERPL CALCULATED.3IONS-SCNC: 13 MMOL/L (ref 3–16)
AST SERPL-CCNC: 63 U/L (ref 15–37)
BASOPHILS ABSOLUTE: 0.2 K/UL (ref 0–0.2)
BASOPHILS RELATIVE PERCENT: 1.2 %
BILIRUB SERPL-MCNC: 7.4 MG/DL (ref 0–1)
BILIRUBIN DIRECT: 4 MG/DL (ref 0–0.3)
BILIRUBIN, INDIRECT: 3.4 MG/DL (ref 0–1)
BUN BLDV-MCNC: 8 MG/DL (ref 7–20)
CALCIUM SERPL-MCNC: 9 MG/DL (ref 8.3–10.6)
CHLORIDE BLD-SCNC: 98 MMOL/L (ref 99–110)
CO2: 20 MMOL/L (ref 21–32)
CREAT SERPL-MCNC: 0.6 MG/DL (ref 0.9–1.3)
EOSINOPHILS ABSOLUTE: 0.5 K/UL (ref 0–0.6)
EOSINOPHILS RELATIVE PERCENT: 3.2 %
GFR AFRICAN AMERICAN: >60
GFR NON-AFRICAN AMERICAN: >60
GLUCOSE BLD-MCNC: 122 MG/DL (ref 70–99)
HCT VFR BLD CALC: 22.4 % (ref 40.5–52.5)
HEMOGLOBIN: 7.7 G/DL (ref 13.5–17.5)
INR BLD: 1.98 (ref 0.86–1.14)
LYMPHOCYTES ABSOLUTE: 2.1 K/UL (ref 1–5.1)
LYMPHOCYTES RELATIVE PERCENT: 13.8 %
MCH RBC QN AUTO: 36.3 PG (ref 26–34)
MCHC RBC AUTO-ENTMCNC: 34.3 G/DL (ref 31–36)
MCV RBC AUTO: 105.8 FL (ref 80–100)
MONOCYTES ABSOLUTE: 1.7 K/UL (ref 0–1.3)
MONOCYTES RELATIVE PERCENT: 11 %
NEUTROPHILS ABSOLUTE: 10.8 K/UL (ref 1.7–7.7)
NEUTROPHILS RELATIVE PERCENT: 70.8 %
PDW BLD-RTO: 14.4 % (ref 12.4–15.4)
PHOSPHORUS: 2.1 MG/DL (ref 2.5–4.9)
PLATELET # BLD: 389 K/UL (ref 135–450)
PMV BLD AUTO: 7.6 FL (ref 5–10.5)
POTASSIUM SERPL-SCNC: 3.9 MMOL/L (ref 3.5–5.1)
PROTHROMBIN TIME: 22.6 SEC (ref 9.8–13)
RBC # BLD: 2.11 M/UL (ref 4.2–5.9)
SODIUM BLD-SCNC: 131 MMOL/L (ref 136–145)
TOTAL PROTEIN: 6.6 G/DL (ref 6.4–8.2)
WBC # BLD: 15.2 K/UL (ref 4–11)

## 2018-08-25 PROCEDURE — 6370000000 HC RX 637 (ALT 250 FOR IP): Performed by: STUDENT IN AN ORGANIZED HEALTH CARE EDUCATION/TRAINING PROGRAM

## 2018-08-25 PROCEDURE — 80076 HEPATIC FUNCTION PANEL: CPT

## 2018-08-25 PROCEDURE — 1200000000 HC SEMI PRIVATE

## 2018-08-25 PROCEDURE — 80048 BASIC METABOLIC PNL TOTAL CA: CPT

## 2018-08-25 PROCEDURE — 84100 ASSAY OF PHOSPHORUS: CPT

## 2018-08-25 PROCEDURE — 36415 COLL VENOUS BLD VENIPUNCTURE: CPT

## 2018-08-25 PROCEDURE — 99232 SBSQ HOSP IP/OBS MODERATE 35: CPT | Performed by: HOSPITALIST

## 2018-08-25 PROCEDURE — 2580000003 HC RX 258: Performed by: STUDENT IN AN ORGANIZED HEALTH CARE EDUCATION/TRAINING PROGRAM

## 2018-08-25 PROCEDURE — 85025 COMPLETE CBC W/AUTO DIFF WBC: CPT

## 2018-08-25 PROCEDURE — 85610 PROTHROMBIN TIME: CPT

## 2018-08-25 RX ADMIN — RIFAXIMIN 550 MG: 550 TABLET ORAL at 09:47

## 2018-08-25 RX ADMIN — RIFAXIMIN 550 MG: 550 TABLET ORAL at 21:16

## 2018-08-25 RX ADMIN — MIRTAZAPINE 7.5 MG: 15 TABLET, FILM COATED ORAL at 21:16

## 2018-08-25 RX ADMIN — FUROSEMIDE 20 MG: 20 TABLET ORAL at 09:47

## 2018-08-25 RX ADMIN — Medication 10 ML: at 21:17

## 2018-08-25 RX ADMIN — SODIUM CITRATE AND CITRIC ACID MONOHYDRATE 30 ML: 500; 334 SOLUTION ORAL at 21:16

## 2018-08-25 RX ADMIN — Medication 10 ML: at 09:48

## 2018-08-25 RX ADMIN — POTASSIUM PHOSPHATE, MONOBASIC 250 MG: 500 TABLET, SOLUBLE ORAL at 09:47

## 2018-08-25 RX ADMIN — ACETAMINOPHEN 650 MG: 325 TABLET ORAL at 22:05

## 2018-08-25 RX ADMIN — SODIUM CITRATE AND CITRIC ACID MONOHYDRATE 30 ML: 500; 334 SOLUTION ORAL at 09:47

## 2018-08-25 RX ADMIN — PHYTONADIONE 5 MG: 5 TABLET ORAL at 09:47

## 2018-08-25 RX ADMIN — POTASSIUM PHOSPHATE, MONOBASIC 250 MG: 500 TABLET, SOLUBLE ORAL at 12:11

## 2018-08-25 RX ADMIN — PANTOPRAZOLE SODIUM 40 MG: 40 TABLET, DELAYED RELEASE ORAL at 05:31

## 2018-08-25 RX ADMIN — SPIRONOLACTONE 50 MG: 50 TABLET, FILM COATED ORAL at 09:47

## 2018-08-25 RX ADMIN — ZOLPIDEM TARTRATE 5 MG: 5 TABLET ORAL at 21:16

## 2018-08-25 ASSESSMENT — PAIN SCALES - GENERAL
PAINLEVEL_OUTOF10: 9
PAINLEVEL_OUTOF10: 0
PAINLEVEL_OUTOF10: 0

## 2018-08-25 NOTE — PROGRESS NOTES
Radiology:  XR CHEST STANDARD (2 VW)   Final Result   Low lung volumes with mild right basilar atelectasis and small pleural effusion. CT CHEST W CONTRAST   Final Result      Bilateral recent rib fractures right posterolateral seventh rib and left posterior sixth and seventh ribs. Large multilocular fluid collection lateral to right costal margin anterior inferior to right scapula and smaller multilocular fluid collection lateral to left costal margin inferior to left scapula both most likely hematomas. Chronic subsegmental atelectasis or scarring right lower lobe. Resolution of prior left lower lobe consolidation with atelectasis or residual pneumonia remaining. Decrease in size of prior moderate size left pleural effusion with a small left pleural effusion remaining. US EXTREMITY RIGHT NON VASC LIMITED   Final Result      Large solid mass right infrascapular soft tissues corresponding to palpable finding. In absence of right infrascapular soft tissue mass on CT chest 3 months prior and in clinical information of fall on CT head 8/18/2018, question if the mass is a large hematoma although a rapidly growing neoplasm still is a consideration. Suggest initial further evaluation with a CT of chest (chest wall) with IV contrast.            CT Head WO Contrast   Final Result      1. No findings for acute intracranial abnormality. 2.  Right frontal scalp edema without underlying skull fracture. XR CHEST STANDARD (2 VW)   Final Result      1. No findings for acute cardiopulmonary disease. ASSESSMENT AND PLAN:   [place] a 61 y. o. male w/ hx of alcoholic liver disease, HTN and HLD p/w failure to thrive.      Failure to thrive w/ severe malnutrition -Multifactorial including alcoholic liver cirrhosis, severe malnutrition, multiple falls. Tolerating PO well.    - continue Ensure Enlive, Ensure clear TID  - continue Remeron  - continue home Ambien   -

## 2018-08-26 LAB
ALBUMIN SERPL-MCNC: 2.6 G/DL (ref 3.4–5)
ALP BLD-CCNC: 221 U/L (ref 40–129)
ALT SERPL-CCNC: 15 U/L (ref 10–40)
ANION GAP SERPL CALCULATED.3IONS-SCNC: 11 MMOL/L (ref 3–16)
AST SERPL-CCNC: 63 U/L (ref 15–37)
BASOPHILS ABSOLUTE: 0.2 K/UL (ref 0–0.2)
BASOPHILS RELATIVE PERCENT: 1 %
BILIRUB SERPL-MCNC: 7 MG/DL (ref 0–1)
BILIRUBIN DIRECT: 3.8 MG/DL (ref 0–0.3)
BILIRUBIN, INDIRECT: 3.2 MG/DL (ref 0–1)
BUN BLDV-MCNC: 11 MG/DL (ref 7–20)
CALCIUM SERPL-MCNC: 9.6 MG/DL (ref 8.3–10.6)
CHLORIDE BLD-SCNC: 98 MMOL/L (ref 99–110)
CO2: 24 MMOL/L (ref 21–32)
CREAT SERPL-MCNC: 0.6 MG/DL (ref 0.9–1.3)
EOSINOPHILS ABSOLUTE: 0.6 K/UL (ref 0–0.6)
EOSINOPHILS RELATIVE PERCENT: 3.9 %
GFR AFRICAN AMERICAN: >60
GFR NON-AFRICAN AMERICAN: >60
GLUCOSE BLD-MCNC: 130 MG/DL (ref 70–99)
HCT VFR BLD CALC: 22.1 % (ref 40.5–52.5)
HEMOGLOBIN: 7.4 G/DL (ref 13.5–17.5)
INR BLD: 1.88 (ref 0.86–1.14)
LYMPHOCYTES ABSOLUTE: 2.1 K/UL (ref 1–5.1)
LYMPHOCYTES RELATIVE PERCENT: 13.5 %
MCH RBC QN AUTO: 35.4 PG (ref 26–34)
MCHC RBC AUTO-ENTMCNC: 33.6 G/DL (ref 31–36)
MCV RBC AUTO: 105.4 FL (ref 80–100)
MONOCYTES ABSOLUTE: 1.9 K/UL (ref 0–1.3)
MONOCYTES RELATIVE PERCENT: 12.6 %
NEUTROPHILS ABSOLUTE: 10.6 K/UL (ref 1.7–7.7)
NEUTROPHILS RELATIVE PERCENT: 69 %
PDW BLD-RTO: 14.5 % (ref 12.4–15.4)
PHOSPHORUS: 2 MG/DL (ref 2.5–4.9)
PLATELET # BLD: 390 K/UL (ref 135–450)
PMV BLD AUTO: 8 FL (ref 5–10.5)
POTASSIUM SERPL-SCNC: 4.1 MMOL/L (ref 3.5–5.1)
PROTHROMBIN TIME: 21.4 SEC (ref 9.8–13)
RBC # BLD: 2.1 M/UL (ref 4.2–5.9)
SODIUM BLD-SCNC: 133 MMOL/L (ref 136–145)
TOTAL PROTEIN: 7 G/DL (ref 6.4–8.2)
WBC # BLD: 15.3 K/UL (ref 4–11)

## 2018-08-26 PROCEDURE — 6370000000 HC RX 637 (ALT 250 FOR IP): Performed by: STUDENT IN AN ORGANIZED HEALTH CARE EDUCATION/TRAINING PROGRAM

## 2018-08-26 PROCEDURE — 99232 SBSQ HOSP IP/OBS MODERATE 35: CPT | Performed by: HOSPITALIST

## 2018-08-26 PROCEDURE — 85025 COMPLETE CBC W/AUTO DIFF WBC: CPT

## 2018-08-26 PROCEDURE — 80076 HEPATIC FUNCTION PANEL: CPT

## 2018-08-26 PROCEDURE — 84100 ASSAY OF PHOSPHORUS: CPT

## 2018-08-26 PROCEDURE — 1200000000 HC SEMI PRIVATE

## 2018-08-26 PROCEDURE — 85610 PROTHROMBIN TIME: CPT

## 2018-08-26 PROCEDURE — 2580000003 HC RX 258: Performed by: STUDENT IN AN ORGANIZED HEALTH CARE EDUCATION/TRAINING PROGRAM

## 2018-08-26 PROCEDURE — 80048 BASIC METABOLIC PNL TOTAL CA: CPT

## 2018-08-26 PROCEDURE — 36415 COLL VENOUS BLD VENIPUNCTURE: CPT

## 2018-08-26 RX ORDER — DOCUSATE SODIUM 100 MG/1
100 CAPSULE, LIQUID FILLED ORAL DAILY
Status: DISCONTINUED | OUTPATIENT
Start: 2018-08-26 | End: 2018-08-29 | Stop reason: HOSPADM

## 2018-08-26 RX ADMIN — FUROSEMIDE 20 MG: 20 TABLET ORAL at 09:46

## 2018-08-26 RX ADMIN — Medication 10 ML: at 09:47

## 2018-08-26 RX ADMIN — ACETAMINOPHEN 650 MG: 325 TABLET ORAL at 20:55

## 2018-08-26 RX ADMIN — RIFAXIMIN 550 MG: 550 TABLET ORAL at 20:55

## 2018-08-26 RX ADMIN — SPIRONOLACTONE 50 MG: 50 TABLET, FILM COATED ORAL at 09:46

## 2018-08-26 RX ADMIN — Medication 10 ML: at 20:56

## 2018-08-26 RX ADMIN — ACETAMINOPHEN 650 MG: 325 TABLET ORAL at 06:06

## 2018-08-26 RX ADMIN — SODIUM CITRATE AND CITRIC ACID MONOHYDRATE 30 ML: 500; 334 SOLUTION ORAL at 21:00

## 2018-08-26 RX ADMIN — Medication 5 MG: at 20:55

## 2018-08-26 RX ADMIN — ZOLPIDEM TARTRATE 5 MG: 5 TABLET ORAL at 20:55

## 2018-08-26 RX ADMIN — PHYTONADIONE 5 MG: 5 TABLET ORAL at 09:46

## 2018-08-26 RX ADMIN — DOCUSATE SODIUM 100 MG: 100 CAPSULE, LIQUID FILLED ORAL at 09:46

## 2018-08-26 RX ADMIN — MIRTAZAPINE 7.5 MG: 15 TABLET, FILM COATED ORAL at 20:55

## 2018-08-26 RX ADMIN — SODIUM CITRATE AND CITRIC ACID MONOHYDRATE 30 ML: 500; 334 SOLUTION ORAL at 09:46

## 2018-08-26 RX ADMIN — PANTOPRAZOLE SODIUM 40 MG: 40 TABLET, DELAYED RELEASE ORAL at 06:05

## 2018-08-26 RX ADMIN — RIFAXIMIN 550 MG: 550 TABLET ORAL at 09:46

## 2018-08-26 ASSESSMENT — PAIN SCALES - GENERAL
PAINLEVEL_OUTOF10: 10
PAINLEVEL_OUTOF10: 7
PAINLEVEL_OUTOF10: 0

## 2018-08-26 NOTE — PLAN OF CARE
Problem: Falls - Risk of:  Goal: Will remain free from falls  Will remain free from falls   Outcome: Ongoing  He is a fall risk. Armband in place, door open, and bed alarm on. He calls for assistance out of bed. Call light in reach. Will monitor. Problem: Risk for Impaired Skin Integrity  Goal: Tissue integrity - skin and mucous membranes  Structural intactness and normal physiological function of skin and  mucous membranes. Intervention: SKIN ASSESSMENT  He has an abrasion to his forehead. Area is dry and open to air. Mass like area to R scapula, also open to air. He is receiving vitamin K, INR today, 1.89. He turns and repositions on his own. Mepilex to right hip is clean dry and intact. Will monitor. Problem: Nutrition  Goal: Optimal nutrition therapy  Outcome: Ongoing   Appetite is poor. Supplements ordered and encouraged. Problem: Pain:  Goal: Control of acute pain  Control of acute pain   Outcome: Ongoing  No complaints of pain. Will monitor.

## 2018-08-26 NOTE — PROGRESS NOTES
Addendum to Resident H& P/Progress note:  I have personally seen,examined and evaluated the patient. I have reviewed the current history, physical findings, labs and assessment and plan and agree with note as documented by resident MD ( Ana Segal)  The patient appears to have a significant ascites; his weight is up 20 Lbs since admission. Will arrange u/s guided paracentesis.     Colten Madison MD, FACP

## 2018-08-27 LAB
ALBUMIN FLUID: <0.2 G/DL
ALBUMIN SERPL-MCNC: 2.3 G/DL (ref 3.4–5)
ALP BLD-CCNC: 233 U/L (ref 40–129)
ALT SERPL-CCNC: 13 U/L (ref 10–40)
AMYLASE FLUID: 22 U/L
ANION GAP SERPL CALCULATED.3IONS-SCNC: 11 MMOL/L (ref 3–16)
ANISOCYTOSIS: ABNORMAL
APPEARANCE FLUID: NORMAL
AST SERPL-CCNC: 58 U/L (ref 15–37)
BASOPHILS ABSOLUTE: 0.1 K/UL (ref 0–0.2)
BASOPHILS RELATIVE PERCENT: 1 %
BILIRUB SERPL-MCNC: 6.9 MG/DL (ref 0–1)
BILIRUBIN DIRECT: 3.6 MG/DL (ref 0–0.3)
BILIRUBIN, INDIRECT: 3.3 MG/DL (ref 0–1)
BUN BLDV-MCNC: 12 MG/DL (ref 7–20)
CALCIUM SERPL-MCNC: 9.3 MG/DL (ref 8.3–10.6)
CELL COUNT FLUID TYPE: NORMAL
CHLORIDE BLD-SCNC: 97 MMOL/L (ref 99–110)
CLOT EVALUATION: NORMAL
CO2: 24 MMOL/L (ref 21–32)
COLOR FLUID: YELLOW
CREAT SERPL-MCNC: 0.7 MG/DL (ref 0.9–1.3)
EOSINOPHILS ABSOLUTE: 0.6 K/UL (ref 0–0.6)
EOSINOPHILS RELATIVE PERCENT: 4.4 %
FLUID TYPE: NORMAL
GFR AFRICAN AMERICAN: >60
GFR NON-AFRICAN AMERICAN: >60
GLUCOSE BLD-MCNC: 105 MG/DL (ref 70–99)
GLUCOSE, FLUID: 122 MG/DL
HCT VFR BLD CALC: 21.1 % (ref 40.5–52.5)
HEMOGLOBIN: 7.2 G/DL (ref 13.5–17.5)
INR BLD: 1.92 (ref 0.86–1.14)
LD, FLUID: 37 U/L
LYMPHOCYTES ABSOLUTE: 2.1 K/UL (ref 1–5.1)
LYMPHOCYTES RELATIVE PERCENT: 14.6 %
MCH RBC QN AUTO: 36 PG (ref 26–34)
MCHC RBC AUTO-ENTMCNC: 34.2 G/DL (ref 31–36)
MCV RBC AUTO: 105.2 FL (ref 80–100)
MONOCYTES ABSOLUTE: 1.9 K/UL (ref 0–1.3)
MONOCYTES RELATIVE PERCENT: 13 %
NEUTROPHILS ABSOLUTE: 9.6 K/UL (ref 1.7–7.7)
NEUTROPHILS RELATIVE PERCENT: 67 %
NUCLEATED CELLS FLUID: 7 /CUMM
NUMBER OF CELLS COUNTED FLUID: 100
PDW BLD-RTO: 14.9 % (ref 12.4–15.4)
PHOSPHORUS: 2.3 MG/DL (ref 2.5–4.9)
PLATELET # BLD: 407 K/UL (ref 135–450)
PMV BLD AUTO: 8.1 FL (ref 5–10.5)
POLYCHROMASIA: ABNORMAL
POTASSIUM SERPL-SCNC: 3.8 MMOL/L (ref 3.5–5.1)
PROTEIN FLUID: 0.5 G/DL
PROTHROMBIN TIME: 21.9 SEC (ref 9.8–13)
RBC # BLD: 2 M/UL (ref 4.2–5.9)
RBC FLUID: 32 /CUMM
SLIDE REVIEW: ABNORMAL
SODIUM BLD-SCNC: 132 MMOL/L (ref 136–145)
TOTAL PROTEIN: 6.6 G/DL (ref 6.4–8.2)
WBC # BLD: 14.3 K/UL (ref 4–11)

## 2018-08-27 PROCEDURE — 84100 ASSAY OF PHOSPHORUS: CPT

## 2018-08-27 PROCEDURE — 99232 SBSQ HOSP IP/OBS MODERATE 35: CPT | Performed by: HOSPITALIST

## 2018-08-27 PROCEDURE — 85025 COMPLETE CBC W/AUTO DIFF WBC: CPT

## 2018-08-27 PROCEDURE — 36415 COLL VENOUS BLD VENIPUNCTURE: CPT

## 2018-08-27 PROCEDURE — 88112 CYTOPATH CELL ENHANCE TECH: CPT

## 2018-08-27 PROCEDURE — 82042 OTHER SOURCE ALBUMIN QUAN EA: CPT

## 2018-08-27 PROCEDURE — P9047 ALBUMIN (HUMAN), 25%, 50ML: HCPCS | Performed by: STUDENT IN AN ORGANIZED HEALTH CARE EDUCATION/TRAINING PROGRAM

## 2018-08-27 PROCEDURE — 97116 GAIT TRAINING THERAPY: CPT

## 2018-08-27 PROCEDURE — 85610 PROTHROMBIN TIME: CPT

## 2018-08-27 PROCEDURE — 83615 LACTATE (LD) (LDH) ENZYME: CPT

## 2018-08-27 PROCEDURE — 82150 ASSAY OF AMYLASE: CPT

## 2018-08-27 PROCEDURE — 82945 GLUCOSE OTHER FLUID: CPT

## 2018-08-27 PROCEDURE — 6370000000 HC RX 637 (ALT 250 FOR IP): Performed by: STUDENT IN AN ORGANIZED HEALTH CARE EDUCATION/TRAINING PROGRAM

## 2018-08-27 PROCEDURE — 84157 ASSAY OF PROTEIN OTHER: CPT

## 2018-08-27 PROCEDURE — 80048 BASIC METABOLIC PNL TOTAL CA: CPT

## 2018-08-27 PROCEDURE — 6360000002 HC RX W HCPCS: Performed by: STUDENT IN AN ORGANIZED HEALTH CARE EDUCATION/TRAINING PROGRAM

## 2018-08-27 PROCEDURE — 87070 CULTURE OTHR SPECIMN AEROBIC: CPT

## 2018-08-27 PROCEDURE — 89051 BODY FLUID CELL COUNT: CPT

## 2018-08-27 PROCEDURE — 97530 THERAPEUTIC ACTIVITIES: CPT

## 2018-08-27 PROCEDURE — 0W9G3ZZ DRAINAGE OF PERITONEAL CAVITY, PERCUTANEOUS APPROACH: ICD-10-PCS | Performed by: STUDENT IN AN ORGANIZED HEALTH CARE EDUCATION/TRAINING PROGRAM

## 2018-08-27 PROCEDURE — 1200000000 HC SEMI PRIVATE

## 2018-08-27 PROCEDURE — 87205 SMEAR GRAM STAIN: CPT

## 2018-08-27 PROCEDURE — 2580000003 HC RX 258: Performed by: STUDENT IN AN ORGANIZED HEALTH CARE EDUCATION/TRAINING PROGRAM

## 2018-08-27 PROCEDURE — 80076 HEPATIC FUNCTION PANEL: CPT

## 2018-08-27 RX ORDER — ALBUMIN (HUMAN) 12.5 G/50ML
25 SOLUTION INTRAVENOUS EVERY 6 HOURS
Status: COMPLETED | OUTPATIENT
Start: 2018-08-27 | End: 2018-08-27

## 2018-08-27 RX ADMIN — PANTOPRAZOLE SODIUM 40 MG: 40 TABLET, DELAYED RELEASE ORAL at 06:07

## 2018-08-27 RX ADMIN — SODIUM CITRATE AND CITRIC ACID MONOHYDRATE 30 ML: 500; 334 SOLUTION ORAL at 11:32

## 2018-08-27 RX ADMIN — MIRTAZAPINE 7.5 MG: 15 TABLET, FILM COATED ORAL at 21:19

## 2018-08-27 RX ADMIN — ACETAMINOPHEN 650 MG: 325 TABLET ORAL at 21:19

## 2018-08-27 RX ADMIN — ALBUMIN (HUMAN) 25 G: 0.25 INJECTION, SOLUTION INTRAVENOUS at 11:31

## 2018-08-27 RX ADMIN — SPIRONOLACTONE 50 MG: 50 TABLET, FILM COATED ORAL at 11:32

## 2018-08-27 RX ADMIN — Medication 10 ML: at 21:19

## 2018-08-27 RX ADMIN — ZOLPIDEM TARTRATE 5 MG: 5 TABLET ORAL at 21:20

## 2018-08-27 RX ADMIN — PHYTONADIONE 5 MG: 5 TABLET ORAL at 11:32

## 2018-08-27 RX ADMIN — ALBUMIN (HUMAN) 25 G: 0.25 INJECTION, SOLUTION INTRAVENOUS at 18:43

## 2018-08-27 RX ADMIN — RIFAXIMIN 550 MG: 550 TABLET ORAL at 21:19

## 2018-08-27 RX ADMIN — DOCUSATE SODIUM 100 MG: 100 CAPSULE, LIQUID FILLED ORAL at 11:32

## 2018-08-27 RX ADMIN — SODIUM CITRATE AND CITRIC ACID MONOHYDRATE 30 ML: 500; 334 SOLUTION ORAL at 21:21

## 2018-08-27 RX ADMIN — ACETAMINOPHEN 650 MG: 325 TABLET ORAL at 13:48

## 2018-08-27 RX ADMIN — RIFAXIMIN 550 MG: 550 TABLET ORAL at 11:32

## 2018-08-27 ASSESSMENT — PAIN DESCRIPTION - PAIN TYPE: TYPE: CHRONIC PAIN

## 2018-08-27 ASSESSMENT — ENCOUNTER SYMPTOMS
SHORTNESS OF BREATH: 0
SINUS PAIN: 0
BLURRED VISION: 0
NAUSEA: 0
HEMOPTYSIS: 0
SPUTUM PRODUCTION: 0
ABDOMINAL PAIN: 1
COUGH: 0
DOUBLE VISION: 0

## 2018-08-27 ASSESSMENT — PAIN DESCRIPTION - PROGRESSION: CLINICAL_PROGRESSION: NOT CHANGED

## 2018-08-27 ASSESSMENT — PAIN DESCRIPTION - FREQUENCY: FREQUENCY: CONTINUOUS

## 2018-08-27 ASSESSMENT — PAIN SCALES - GENERAL
PAINLEVEL_OUTOF10: 4
PAINLEVEL_OUTOF10: 0
PAINLEVEL_OUTOF10: 8
PAINLEVEL_OUTOF10: 0

## 2018-08-27 ASSESSMENT — PAIN DESCRIPTION - ONSET: ONSET: ON-GOING

## 2018-08-27 ASSESSMENT — PAIN DESCRIPTION - ORIENTATION: ORIENTATION: RIGHT

## 2018-08-27 ASSESSMENT — PAIN DESCRIPTION - DESCRIPTORS: DESCRIPTORS: ACHING

## 2018-08-27 ASSESSMENT — PAIN DESCRIPTION - LOCATION: LOCATION: BACK;RIB CAGE

## 2018-08-27 NOTE — PROCEDURES
clear  Dressing: 4x4 sterile gauze  Patient tolerance: Patient tolerated the procedure well with no immediate complications          Hortensia Mooney MD  8/27/2018

## 2018-08-27 NOTE — PROGRESS NOTES
tying pants in stance)  Toileting:  (denies need)        Balance  Sitting Balance: Supervision  Standing Balance: Contact guard assistance  Standing Balance  Activity: functional mobility around unit, transfers   Sit to stand: Stand by assistance  Stand to sit: Stand by assistance  Functional Mobility  Functional - Mobility Device: No device  Assist Level: Contact guard assistance  Functional Mobility Comments: Pt completed mobility trial around unit (~200 ft) total. Pt walks w/ slow, but steady pace. Pt visibly fatigues towards end of walk, wavers towards L side and needs occasional cues to avoid bumping into objects (i.e. doorway, vitals machine)  Bed mobility  Supine to Sit: Minimal assistance (assist to pull up from therapist w/ LUE, pt also using bedrail. HOB flat)  Sit to Supine: Stand by assistance  Scooting: Stand by assistance (to EOB)  Transfers  Sit to stand: Stand by assistance  Stand to sit: Stand by assistance        Cognition  Overall Cognitive Status: [facility]      Assessment   Performance deficits / Impairments: Decreased functional mobility ; Decreased ADL status; Decreased endurance  Assessment: Pt demonstrates fairly stable gait with mobility initially, but fatigues easily and needs cues for safety to avoid objects on L side d/t decreased balance. Pt is not safe to be up walking independently. Would benefit from continued therapy at d/c.    Treatment Diagnosis: Impaired functional activity tolerance and functional mobility  Patient Education: Activity promotion- pt verb understanding  REQUIRES OT FOLLOW UP: Yes  Activity Tolerance  Activity Tolerance: Patient Tolerated treatment well  Safety Devices  Safety Devices in place: Yes  Type of devices: Left in bed;Bed alarm in place;Call light within reach;Nurse notified          Plan   Plan  Times per week: 2-5  Times per day: Daily  Current Treatment Recommendations: Functional Mobility Training, Endurance Training, Self-Care / ADL  If patient discharges

## 2018-08-27 NOTE — PROGRESS NOTES
Internal Medicine PGY-1 Resident Progress Note    PCP: Georgiana Julien MD    Date of Admission: 8/18/2018    Chief Complaint:   C/Kika Fuentes 1106 Problems    Diagnosis Date Noted    Ascites due to alcoholic cirrhosis ([facility] [address] [A64.51]     Hypophosphatasia [E83.39]     Traumatic hematoma of right shoulder [S40.011A]     Fall [W19. XXXA]     Severe malnutrition ([facility] [address] [E43] 08/20/2018    DNR (do not resuscitate) Momo Ploo     Encounter for home hospice care [Z51.5]     Hypokalemia [E87.6]     Hypomagnesemia [E83.42]     Hyponatremia [E87.1]     Chronic blood loss anemia [D50.0]     End stage liver disease ([facility] [address] [K72.90]     Physical deconditioning [R53.81]     Failure to thrive in adult [R62.7] 08/18/2018       Subjective: Feeling better regarding his abdominal discomfort and distension after the paracentesis. Pt states last one was 6wks ago. Still having rib pain, but is using his IS which is helping him. Tolerating Ensure. No other concerns or complaints except as below. Review of Systems   Constitutional: Negative for chills and fever. HENT: Negative for congestion, hearing loss and sinus pain. Eyes: Negative for blurred vision and double vision. Respiratory: Negative for cough, hemoptysis, sputum production and shortness of breath. Cardiovascular: Negative for chest pain and palpitations. Gastrointestinal: Positive for abdominal pain. Negative for nausea. Improving abdominal discomfort   Genitourinary: Negative for dysuria, frequency and urgency. Musculoskeletal:        Rib pain when breathing   Skin: Negative. Neurological: Negative for dizziness, speech change, focal weakness and headaches. Psychiatric/Behavioral: The patient is not nervous/anxious.       Medications:  Reviewed    Infusion Medications   Scheduled Medications    albumin human  25 g Intravenous Q6H    docusate sodium  100 mg Oral Daily    furosemide  20 mg Oral Daily    spironolactone  50 mg Oral Daily    note:  I have personally seen,examined and evaluated the patient.  I have reviewed the current history, physical findings, labs and assessment and plan and agree with note as documented by resident MD ( Lenin Mera)      Dayo Taveras MD, [address]

## 2018-08-28 LAB
ABO/RH: NORMAL
ALBUMIN SERPL-MCNC: 2.9 G/DL (ref 3.4–5)
ALP BLD-CCNC: 204 U/L (ref 40–129)
ALT SERPL-CCNC: 12 U/L (ref 10–40)
ANION GAP SERPL CALCULATED.3IONS-SCNC: 10 MMOL/L (ref 3–16)
ANTIBODY SCREEN: NORMAL
AST SERPL-CCNC: 47 U/L (ref 15–37)
BASOPHILS ABSOLUTE: 0.1 K/UL (ref 0–0.2)
BASOPHILS RELATIVE PERCENT: 1.2 %
BILIRUB SERPL-MCNC: 5.8 MG/DL (ref 0–1)
BILIRUBIN DIRECT: 3.2 MG/DL (ref 0–0.3)
BILIRUBIN, INDIRECT: 2.6 MG/DL (ref 0–1)
BUN BLDV-MCNC: 9 MG/DL (ref 7–20)
CALCIUM SERPL-MCNC: 9.6 MG/DL (ref 8.3–10.6)
CHLORIDE BLD-SCNC: 97 MMOL/L (ref 99–110)
CO2: 26 MMOL/L (ref 21–32)
CREAT SERPL-MCNC: 0.6 MG/DL (ref 0.9–1.3)
EOSINOPHILS ABSOLUTE: 0.7 K/UL (ref 0–0.6)
EOSINOPHILS RELATIVE PERCENT: 5.4 %
GFR AFRICAN AMERICAN: >60
GFR NON-AFRICAN AMERICAN: >60
GLUCOSE BLD-MCNC: 103 MG/DL (ref 70–99)
HCT VFR BLD CALC: 19.4 % (ref 40.5–52.5)
HCT VFR BLD CALC: 23.8 % (ref 40.5–52.5)
HEMOGLOBIN: 6.7 G/DL (ref 13.5–17.5)
HEMOGLOBIN: 8.3 G/DL (ref 13.5–17.5)
INR BLD: 1.89 (ref 0.86–1.14)
LYMPHOCYTES ABSOLUTE: 2 K/UL (ref 1–5.1)
LYMPHOCYTES RELATIVE PERCENT: 16.1 %
MCH RBC QN AUTO: 36.3 PG (ref 26–34)
MCHC RBC AUTO-ENTMCNC: 34.6 G/DL (ref 31–36)
MCV RBC AUTO: 104.7 FL (ref 80–100)
MONOCYTES ABSOLUTE: 1.7 K/UL (ref 0–1.3)
MONOCYTES RELATIVE PERCENT: 14 %
NEUTROPHILS ABSOLUTE: 7.9 K/UL (ref 1.7–7.7)
NEUTROPHILS RELATIVE PERCENT: 63.3 %
PDW BLD-RTO: 14.8 % (ref 12.4–15.4)
PHOSPHORUS: 2.6 MG/DL (ref 2.5–4.9)
PLATELET # BLD: 393 K/UL (ref 135–450)
PMV BLD AUTO: 7.6 FL (ref 5–10.5)
POTASSIUM SERPL-SCNC: 3.5 MMOL/L (ref 3.5–5.1)
PROTHROMBIN TIME: 21.6 SEC (ref 9.8–13)
RBC # BLD: 1.85 M/UL (ref 4.2–5.9)
SODIUM BLD-SCNC: 133 MMOL/L (ref 136–145)
TOTAL PROTEIN: 6.6 G/DL (ref 6.4–8.2)
WBC # BLD: 12.4 K/UL (ref 4–11)

## 2018-08-28 PROCEDURE — 85014 HEMATOCRIT: CPT

## 2018-08-28 PROCEDURE — 97116 GAIT TRAINING THERAPY: CPT

## 2018-08-28 PROCEDURE — 6370000000 HC RX 637 (ALT 250 FOR IP): Performed by: STUDENT IN AN ORGANIZED HEALTH CARE EDUCATION/TRAINING PROGRAM

## 2018-08-28 PROCEDURE — 85025 COMPLETE CBC W/AUTO DIFF WBC: CPT

## 2018-08-28 PROCEDURE — 84100 ASSAY OF PHOSPHORUS: CPT

## 2018-08-28 PROCEDURE — 2580000003 HC RX 258: Performed by: STUDENT IN AN ORGANIZED HEALTH CARE EDUCATION/TRAINING PROGRAM

## 2018-08-28 PROCEDURE — P9016 RBC LEUKOCYTES REDUCED: HCPCS

## 2018-08-28 PROCEDURE — 80076 HEPATIC FUNCTION PANEL: CPT

## 2018-08-28 PROCEDURE — 86900 BLOOD TYPING SEROLOGIC ABO: CPT

## 2018-08-28 PROCEDURE — 36415 COLL VENOUS BLD VENIPUNCTURE: CPT

## 2018-08-28 PROCEDURE — 36430 TRANSFUSION BLD/BLD COMPNT: CPT

## 2018-08-28 PROCEDURE — 85018 HEMOGLOBIN: CPT

## 2018-08-28 PROCEDURE — 85610 PROTHROMBIN TIME: CPT

## 2018-08-28 PROCEDURE — 1200000000 HC SEMI PRIVATE

## 2018-08-28 PROCEDURE — 86923 COMPATIBILITY TEST ELECTRIC: CPT

## 2018-08-28 PROCEDURE — 97530 THERAPEUTIC ACTIVITIES: CPT

## 2018-08-28 PROCEDURE — 80048 BASIC METABOLIC PNL TOTAL CA: CPT

## 2018-08-28 PROCEDURE — 86901 BLOOD TYPING SEROLOGIC RH(D): CPT

## 2018-08-28 PROCEDURE — 86850 RBC ANTIBODY SCREEN: CPT

## 2018-08-28 PROCEDURE — 99232 SBSQ HOSP IP/OBS MODERATE 35: CPT | Performed by: HOSPITALIST

## 2018-08-28 RX ORDER — ZOLPIDEM TARTRATE 5 MG/1
5 TABLET ORAL NIGHTLY PRN
Status: DISCONTINUED | OUTPATIENT
Start: 2018-08-28 | End: 2018-08-29 | Stop reason: HOSPADM

## 2018-08-28 RX ORDER — ZOLPIDEM TARTRATE 5 MG/1
10 TABLET ORAL NIGHTLY PRN
Status: DISCONTINUED | OUTPATIENT
Start: 2018-08-28 | End: 2018-08-28

## 2018-08-28 RX ORDER — 0.9 % SODIUM CHLORIDE 0.9 %
250 INTRAVENOUS SOLUTION INTRAVENOUS ONCE
Status: COMPLETED | OUTPATIENT
Start: 2018-08-28 | End: 2018-08-29

## 2018-08-28 RX ADMIN — ACETAMINOPHEN 650 MG: 325 TABLET ORAL at 22:06

## 2018-08-28 RX ADMIN — Medication 10 ML: at 08:32

## 2018-08-28 RX ADMIN — ACETAMINOPHEN 650 MG: 325 TABLET ORAL at 08:31

## 2018-08-28 RX ADMIN — RIFAXIMIN 550 MG: 550 TABLET ORAL at 08:32

## 2018-08-28 RX ADMIN — SPIRONOLACTONE 50 MG: 50 TABLET, FILM COATED ORAL at 08:32

## 2018-08-28 RX ADMIN — Medication 10 ML: at 22:07

## 2018-08-28 RX ADMIN — DOCUSATE SODIUM 100 MG: 100 CAPSULE, LIQUID FILLED ORAL at 08:32

## 2018-08-28 RX ADMIN — SODIUM CHLORIDE 250 ML: 900 INJECTION, SOLUTION INTRAVENOUS at 12:00

## 2018-08-28 RX ADMIN — SODIUM CITRATE AND CITRIC ACID MONOHYDRATE 30 ML: 500; 334 SOLUTION ORAL at 08:32

## 2018-08-28 RX ADMIN — LIDOCAINE HYDROCHLORIDE: 20 SOLUTION ORAL; TOPICAL at 22:06

## 2018-08-28 RX ADMIN — FUROSEMIDE 20 MG: 20 TABLET ORAL at 08:32

## 2018-08-28 RX ADMIN — RIFAXIMIN 550 MG: 550 TABLET ORAL at 22:06

## 2018-08-28 RX ADMIN — PANTOPRAZOLE SODIUM 40 MG: 40 TABLET, DELAYED RELEASE ORAL at 06:37

## 2018-08-28 RX ADMIN — PHYTONADIONE 5 MG: 5 TABLET ORAL at 08:31

## 2018-08-28 RX ADMIN — ZOLPIDEM TARTRATE 10 MG: 5 TABLET ORAL at 22:06

## 2018-08-28 RX ADMIN — SODIUM CITRATE AND CITRIC ACID MONOHYDRATE 30 ML: 500; 334 SOLUTION ORAL at 22:22

## 2018-08-28 RX ADMIN — MIRTAZAPINE 7.5 MG: 15 TABLET, FILM COATED ORAL at 22:07

## 2018-08-28 ASSESSMENT — PAIN SCALES - GENERAL
PAINLEVEL_OUTOF10: 9
PAINLEVEL_OUTOF10: 9
PAINLEVEL_OUTOF10: 7

## 2018-08-28 ASSESSMENT — ENCOUNTER SYMPTOMS
SINUS PAIN: 0
DOUBLE VISION: 0
VOMITING: 0
SHORTNESS OF BREATH: 0
SPUTUM PRODUCTION: 0
NAUSEA: 0
COUGH: 0
HEMOPTYSIS: 0
BLURRED VISION: 0
ABDOMINAL PAIN: 1

## 2018-08-28 ASSESSMENT — PAIN DESCRIPTION - PAIN TYPE
TYPE: CHRONIC PAIN
TYPE: CHRONIC PAIN

## 2018-08-28 ASSESSMENT — PAIN DESCRIPTION - ORIENTATION: ORIENTATION: POSTERIOR

## 2018-08-28 ASSESSMENT — PAIN DESCRIPTION - LOCATION: LOCATION: RIB CAGE

## 2018-08-28 ASSESSMENT — PAIN DESCRIPTION - ONSET: ONSET: ON-GOING

## 2018-08-28 ASSESSMENT — PAIN DESCRIPTION - FREQUENCY: FREQUENCY: CONTINUOUS

## 2018-08-28 ASSESSMENT — PAIN DESCRIPTION - DESCRIPTORS: DESCRIPTORS: ACHING

## 2018-08-28 NOTE — PROGRESS NOTES
Vitals:    08/28/18 1514 08/28/18 1529   BP: (!) 76/42 (!) 81/47   Pulse: 95    Resp: 18    Temp: 97.5 °F (36.4 °C)    TempSrc: Oral    SpO2: 100%    Weight:     Height:         Patient BP low. Patient asymptomatic. Dr Bryant Sterling paged and notified. No new orders at this time. Will continue to monitor.

## 2018-08-28 NOTE — PROGRESS NOTES
Patient complains of upper gastric pain that is stabbing. Patient states pain is \"really bad\" at 10/10. Patient also complains of feeling \"feverish\" and \"chills\". Patient offered tylenol and he refused stating that is apart of his normal medications. On call resident paged. Awaiting return call.

## 2018-08-28 NOTE — PROGRESS NOTES
unsteady at times. Mobility slow. History of falls. Pt would benefit from continued IP PT to maximize independence. AM-PAC score  AM-PAC Inpatient Mobility Raw Score : 18  AM-PAC Inpatient T-Scale Score : 43.63  Mobility Inpatient CMS 0-100% Score: 46.58  Mobility Inpatient CMS G-Code Modifier : CK    Goals: (as determined and assessed by primary PT)  Time Frame for Short term goals: D/C  Short term goal 1: sit<->stand SBA  ONGOING   Short term goal 2: Amb >150 ft with/without AAD SBA  ONGOING       Plan:  Times per week: 2-5;    Current Treatment Recommendations: Balance Training, Functional Mobility Training, Transfer Training, Gait Training, Safety Education & Training, Endurance Training    Therapy Time    Individual  Concurrent  Group  Co-treatment    Time In  1405            Time Out  1431            Minutes  26              Timed Code Treatment Minutes: 26  Total Treatment Minutes: 26    Will continue per plan of care. If patient is discharged prior to next treatment, this note will serve as the discharge summary. [place], [place] [id number]    No goals met this date.   Continue with inpatient therapy discharge recs  Continue with PT plan of care      Ysabel Fletcher PT, DPT, [id number]

## 2018-08-28 NOTE — PROGRESS NOTES
Neurological: Positive for weakness and headaches. Negative for dizziness, speech change and focal weakness. Pain from falling and hitting his head previously   Psychiatric/Behavioral: The patient has insomnia. The patient is not nervous/anxious. Trouble sleeping, asking for inc in [place] dose, melatonin not effective     Medications:  Reviewed    Infusion Medications   Scheduled Medications    sodium chloride  250 mL Intravenous Once    docusate sodium  100 mg Oral Daily    furosemide  20 mg Oral Daily    spironolactone  50 mg Oral Daily    phytonadione  5 mg Oral Daily    mirtazapine  7.5 mg Oral Nightly    citric acid-sodium citrate  30 mL Oral BID    rifaximin  550 mg Oral BID    pantoprazole  40 mg Oral Daily    sodium chloride flush  10 mL Intravenous 2 times per day     PRN Meds: zolpidem, melatonin, sodium chloride flush, magnesium hydroxide, ondansetron, acetaminophen      Intake/Output Summary (Last 24 hours) at 18 1321  Last data filed at 18 0827   Gross per 24 hour   Intake              120 ml   Output              900 ml   Net             -780 ml       Physical Exam Performed:    Temp  Av °F (36.7 °C)  Min: 97 °F (36.1 °C)  Max: 98.3 °F (36.8 °C)  Pulse  Av.7  Min: 90  Max: 101  BP  Min: 88/53  Max: 98/68  SpO2  Av.1 %  Min: 94 %  Max: 100 %  Patient Vitals for the past 4 hrs:   BP Temp Temp src Pulse Resp SpO2   18 1238 96/63 97 °F (36.1 °C) Oral 94 18 94 %   18 1117 (!) 88/53 98 °F (36.7 °C) Oral 90 18 98 %   18 1103 (!) 91/55 98.2 °F (36.8 °C) Oral 92 17 98 %     BP 96/63   Pulse 94   Temp 97 °F (36.1 °C) (Oral)   Resp 18   Ht 6' 1\" (1.854 m)   Wt 125 lb 8 oz (56.9 kg)   SpO2 94%   BMI 16.56 kg/m²     Physical Exam   Constitutional: He is oriented to person, place, and time. He appears well-developed. No distress.    Sitting up in bed, cachectic appearing, bony ridges of BUE and BLE as well as scapular easily visible [K70.31]     Hypophosphatasia [E83.39]     Traumatic hematoma of right shoulder [S40.011A]     Fall [W19. XXXA]     Severe malnutrition ([facility] [address] [E43] 08/20/2018    DNR (do not resuscitate) Dufm Brunner     Encounter for home hospice care [Z51.5]     Hypokalemia [E87.6]     Hypomagnesemia [E83.42]     Hyponatremia [E87.1]     Chronic blood loss anemia [D50.0]     End stage liver disease ([facility] [address] [K72.90]     Physical deconditioning [R53.81]     Failure to thrive in adult [R62.7] 08/18/2018     FTT w/ severe malnutrion - likely mxfactorial in setting EtOH liver cirrhosis, mx falls. Tolerating Ensure. - cont. Ensure Enlive, Ensure clear TID  - cont. Remeron 7.5mg  - cont. Ambien 5mg PRN sleep   - Consider inc if no improvement in sleep  - cont. Melatonin 5mg PRN sleep  - Dietary following  - awaiting placement      End Stage Liver Disease - MELD score 29, s/p Tx paracentesis 5.3L removed, Glu 122.0 Alb <0.2 Amylase 22 LDH 37 Pro 0.5 Cloudy ascitic, 7NucCells, 32RBC, 100Cellct. - cont. Lasix 20mg and Spironolactone 50mg   - cont. rifaximin 550mg    - daily INR, today 1.89  - Therapeutic Paracentesis (Previous 4-6wks ago)   - Cx - NGTD, no WBC, orgs   - Cytology - pending     Hyponatremia - likely hypovolemic hyponatremia 2/2 poor PO intake and dehydration. Na at 133 today. Patient refusing IVF. - encouraged fluid intake as patient refusing IVF     Non-Anion Gap Metabolic Acidosis   - cont. bicitra 30 mL BID      Traumatic Hematoma of R. Shoulder - Stable   - General surgery consulted   - no surgical intervention   - Vit K 5 mg qd for coagulopathy   - INR daily, today 1.89 (1.92 yesterday)     Asymptomatic Chronic Anemia - Hgb stable at 7.2 today. No s/s acute blood loss. Recent varical banding 8/16/18.  Likely mxfactorial 2/2 malnutrition and end stage liver disease.   - transfuse if Hgb<7  - 1u pRBC transfused for hgb 6.7   - repeat h/h - pending     Hypophosphatemia - improving, s/p KPhos 250mg x4  - 2.6 (2.3

## 2018-08-29 VITALS
HEART RATE: 100 BPM | SYSTOLIC BLOOD PRESSURE: 106 MMHG | DIASTOLIC BLOOD PRESSURE: 72 MMHG | WEIGHT: 125.5 LBS | OXYGEN SATURATION: 98 % | HEIGHT: 73 IN | RESPIRATION RATE: 18 BRPM | TEMPERATURE: 98.8 F | BODY MASS INDEX: 16.63 KG/M2

## 2018-08-29 LAB
ALBUMIN SERPL-MCNC: 2.9 G/DL (ref 3.4–5)
ALP BLD-CCNC: 227 U/L (ref 40–129)
ALT SERPL-CCNC: 12 U/L (ref 10–40)
ANION GAP SERPL CALCULATED.3IONS-SCNC: 10 MMOL/L (ref 3–16)
AST SERPL-CCNC: 50 U/L (ref 15–37)
BASOPHILS ABSOLUTE: 0.1 K/UL (ref 0–0.2)
BASOPHILS RELATIVE PERCENT: 1.1 %
BILIRUB SERPL-MCNC: 6 MG/DL (ref 0–1)
BILIRUBIN DIRECT: 3.3 MG/DL (ref 0–0.3)
BILIRUBIN, INDIRECT: 2.7 MG/DL (ref 0–1)
BUN BLDV-MCNC: 10 MG/DL (ref 7–20)
CALCIUM SERPL-MCNC: 9.8 MG/DL (ref 8.3–10.6)
CHLORIDE BLD-SCNC: 95 MMOL/L (ref 99–110)
CO2: 28 MMOL/L (ref 21–32)
CREAT SERPL-MCNC: 0.6 MG/DL (ref 0.9–1.3)
EOSINOPHILS ABSOLUTE: 0.5 K/UL (ref 0–0.6)
EOSINOPHILS RELATIVE PERCENT: 4.1 %
GFR AFRICAN AMERICAN: >60
GFR NON-AFRICAN AMERICAN: >60
GLUCOSE BLD-MCNC: 105 MG/DL (ref 70–99)
HCT VFR BLD CALC: 23.6 % (ref 40.5–52.5)
HEMATOLOGY PATH CONSULT: NO
HEMOGLOBIN: 8.3 G/DL (ref 13.5–17.5)
INR BLD: 1.93 (ref 0.86–1.14)
LYMPHOCYTES ABSOLUTE: 1.6 K/UL (ref 1–5.1)
LYMPHOCYTES RELATIVE PERCENT: 13.4 %
MCH RBC QN AUTO: 35.7 PG (ref 26–34)
MCHC RBC AUTO-ENTMCNC: 35.1 G/DL (ref 31–36)
MCV RBC AUTO: 102 FL (ref 80–100)
MONOCYTES ABSOLUTE: 1.6 K/UL (ref 0–1.3)
MONOCYTES RELATIVE PERCENT: 13 %
NEUTROPHILS ABSOLUTE: 8.3 K/UL (ref 1.7–7.7)
NEUTROPHILS RELATIVE PERCENT: 68.4 %
PDW BLD-RTO: 16.2 % (ref 12.4–15.4)
PHOSPHORUS: 2.5 MG/DL (ref 2.5–4.9)
PLATELET # BLD: 402 K/UL (ref 135–450)
PMV BLD AUTO: 7.7 FL (ref 5–10.5)
POTASSIUM SERPL-SCNC: 3.9 MMOL/L (ref 3.5–5.1)
PROTHROMBIN TIME: 22 SEC (ref 9.8–13)
RBC # BLD: 2.32 M/UL (ref 4.2–5.9)
SODIUM BLD-SCNC: 133 MMOL/L (ref 136–145)
TOTAL PROTEIN: 6.8 G/DL (ref 6.4–8.2)
WBC # BLD: 12.1 K/UL (ref 4–11)

## 2018-08-29 PROCEDURE — 85610 PROTHROMBIN TIME: CPT

## 2018-08-29 PROCEDURE — 6370000000 HC RX 637 (ALT 250 FOR IP): Performed by: STUDENT IN AN ORGANIZED HEALTH CARE EDUCATION/TRAINING PROGRAM

## 2018-08-29 PROCEDURE — 36415 COLL VENOUS BLD VENIPUNCTURE: CPT

## 2018-08-29 PROCEDURE — 99238 HOSP IP/OBS DSCHRG MGMT 30/<: CPT | Performed by: HOSPITALIST

## 2018-08-29 PROCEDURE — 85025 COMPLETE CBC W/AUTO DIFF WBC: CPT

## 2018-08-29 PROCEDURE — 84100 ASSAY OF PHOSPHORUS: CPT

## 2018-08-29 PROCEDURE — 80048 BASIC METABOLIC PNL TOTAL CA: CPT

## 2018-08-29 PROCEDURE — 80076 HEPATIC FUNCTION PANEL: CPT

## 2018-08-29 PROCEDURE — 2580000003 HC RX 258: Performed by: STUDENT IN AN ORGANIZED HEALTH CARE EDUCATION/TRAINING PROGRAM

## 2018-08-29 RX ORDER — SPIRONOLACTONE 50 MG/1
50 TABLET, FILM COATED ORAL DAILY
Qty: 30 TABLET | Refills: 3 | Status: SHIPPED | OUTPATIENT
Start: 2018-08-29 | End: 2018-10-02 | Stop reason: ALTCHOICE

## 2018-08-29 RX ORDER — FUROSEMIDE 20 MG/1
20 TABLET ORAL DAILY
Qty: 60 TABLET | Refills: 3 | Status: SHIPPED | OUTPATIENT
Start: 2018-08-29 | End: 2018-09-18 | Stop reason: CLARIF

## 2018-08-29 RX ORDER — SPIRONOLACTONE 25 MG/1
25 TABLET ORAL DAILY
Qty: 30 TABLET | Refills: 0 | Status: SHIPPED | OUTPATIENT
Start: 2018-08-29 | End: 2018-09-18 | Stop reason: CLARIF

## 2018-08-29 RX ORDER — MIRTAZAPINE 7.5 MG/1
7.5 TABLET, FILM COATED ORAL NIGHTLY
Qty: 30 TABLET | Refills: 0 | Status: SHIPPED | OUTPATIENT
Start: 2018-08-29 | End: 2019-01-15 | Stop reason: ALTCHOICE

## 2018-08-29 RX ORDER — FUROSEMIDE 20 MG/1
10 TABLET ORAL DAILY
Qty: 15 TABLET | Refills: 0 | Status: SHIPPED | OUTPATIENT
Start: 2018-08-29 | End: 2018-09-18 | Stop reason: CLARIF

## 2018-08-29 RX ADMIN — Medication 10 ML: at 10:32

## 2018-08-29 RX ADMIN — ACETAMINOPHEN 650 MG: 325 TABLET ORAL at 10:27

## 2018-08-29 RX ADMIN — PANTOPRAZOLE SODIUM 40 MG: 40 TABLET, DELAYED RELEASE ORAL at 06:35

## 2018-08-29 RX ADMIN — DOCUSATE SODIUM 100 MG: 100 CAPSULE, LIQUID FILLED ORAL at 10:27

## 2018-08-29 RX ADMIN — SODIUM CITRATE AND CITRIC ACID MONOHYDRATE 30 ML: 500; 334 SOLUTION ORAL at 10:31

## 2018-08-29 RX ADMIN — FUROSEMIDE 20 MG: 20 TABLET ORAL at 10:27

## 2018-08-29 RX ADMIN — RIFAXIMIN 550 MG: 550 TABLET ORAL at 10:27

## 2018-08-29 RX ADMIN — PHYTONADIONE 5 MG: 5 TABLET ORAL at 10:27

## 2018-08-29 RX ADMIN — SPIRONOLACTONE 50 MG: 50 TABLET, FILM COATED ORAL at 10:27

## 2018-08-29 ASSESSMENT — PAIN SCALES - GENERAL
PAINLEVEL_OUTOF10: 7
PAINLEVEL_OUTOF10: 8
PAINLEVEL_OUTOF10: 0

## 2018-08-29 ASSESSMENT — PAIN DESCRIPTION - FREQUENCY: FREQUENCY: CONTINUOUS

## 2018-08-29 ASSESSMENT — PAIN DESCRIPTION - DESCRIPTORS: DESCRIPTORS: ACHING

## 2018-08-29 ASSESSMENT — PAIN DESCRIPTION - PAIN TYPE: TYPE: CHRONIC PAIN

## 2018-08-29 ASSESSMENT — PAIN DESCRIPTION - ORIENTATION: ORIENTATION: RIGHT;LEFT

## 2018-08-29 ASSESSMENT — PAIN DESCRIPTION - ONSET: ONSET: ON-GOING

## 2018-08-29 ASSESSMENT — PAIN DESCRIPTION - LOCATION: LOCATION: GENERALIZED

## 2018-08-29 ASSESSMENT — PAIN DESCRIPTION - PROGRESSION: CLINICAL_PROGRESSION: NOT CHANGED

## 2018-08-29 NOTE — PLAN OF CARE
Problem: Nutrition  Goal: Optimal nutrition therapy  Outcome: Ongoing  Nutrition Problem: Severe malnutrition  Intervention: Food and/or Nutrient Delivery: Continue current diet, Continue current ONS  Nutritional Goals: Patient will tolerate diet and consume 75% or greater of all meals and supplements

## 2018-08-30 LAB
BODY FLUID CULTURE, STERILE: NORMAL
GRAM STAIN RESULT: NORMAL

## 2018-09-01 LAB
BLOOD BANK DISPENSE STATUS: NORMAL
BLOOD BANK DISPENSE STATUS: NORMAL
BLOOD BANK PRODUCT CODE: NORMAL
BLOOD BANK PRODUCT CODE: NORMAL
BPU ID: NORMAL
BPU ID: NORMAL
DESCRIPTION BLOOD BANK: NORMAL
DESCRIPTION BLOOD BANK: NORMAL

## 2018-09-13 ENCOUNTER — TELEPHONE (OUTPATIENT)
Dept: INTERNAL MEDICINE | Age: 58
End: 2018-09-13

## 2018-09-17 ENCOUNTER — HOSPITAL ENCOUNTER (EMERGENCY)
Age: 58
Discharge: HOME | End: 2018-09-17
Attending: EMERGENCY MEDICINE
Payer: COMMERCIAL

## 2018-09-17 ENCOUNTER — APPOINTMENT (OUTPATIENT)
Dept: ULTRASOUND IMAGING | Age: 58
End: 2018-09-17
Payer: COMMERCIAL

## 2018-09-17 ENCOUNTER — OFFICE VISIT (OUTPATIENT)
Dept: INTERNAL MEDICINE | Age: 58
End: 2018-09-17

## 2018-09-17 VITALS
WEIGHT: 156 LBS | BODY MASS INDEX: 20.58 KG/M2 | OXYGEN SATURATION: 94 % | RESPIRATION RATE: 16 BRPM | TEMPERATURE: 98 F | DIASTOLIC BLOOD PRESSURE: 63 MMHG | HEART RATE: 83 BPM | SYSTOLIC BLOOD PRESSURE: 95 MMHG

## 2018-09-17 VITALS
BODY MASS INDEX: 20.67 KG/M2 | SYSTOLIC BLOOD PRESSURE: 110 MMHG | HEART RATE: 104 BPM | DIASTOLIC BLOOD PRESSURE: 70 MMHG | HEIGHT: 73 IN | WEIGHT: 156 LBS

## 2018-09-17 DIAGNOSIS — E43 SEVERE MALNUTRITION (HCC): Chronic | ICD-10-CM

## 2018-09-17 DIAGNOSIS — K70.31 ASCITES DUE TO ALCOHOLIC CIRRHOSIS (HCC): ICD-10-CM

## 2018-09-17 DIAGNOSIS — S40.011D TRAUMATIC HEMATOMA OF RIGHT SHOULDER, SUBSEQUENT ENCOUNTER: ICD-10-CM

## 2018-09-17 DIAGNOSIS — K70.31 ALCOHOLIC CIRRHOSIS OF LIVER WITH ASCITES (HCC): Primary | ICD-10-CM

## 2018-09-17 DIAGNOSIS — K70.9 LIVER DISEASE, CHRONIC, DUE TO ALCOHOL (HCC): ICD-10-CM

## 2018-09-17 PROBLEM — R17 JAUNDICE: Status: RESOLVED | Noted: 2018-06-07 | Resolved: 2018-09-17

## 2018-09-17 PROBLEM — R62.7 FAILURE TO THRIVE IN ADULT: Status: RESOLVED | Noted: 2018-08-18 | Resolved: 2018-09-17

## 2018-09-17 PROBLEM — E44.0 MODERATE MALNUTRITION (HCC): Chronic | Status: RESOLVED | Noted: 2018-06-12 | Resolved: 2018-09-17

## 2018-09-17 LAB
ALBUMIN SERPL-MCNC: 2.9 G/DL (ref 3.4–5)
ALP BLD-CCNC: 209 U/L (ref 40–129)
ALT SERPL-CCNC: 12 U/L (ref 10–40)
APTT: 41.4 SEC (ref 26–36)
AST SERPL-CCNC: 44 U/L (ref 15–37)
BASE EXCESS VENOUS: -8 (ref -3–3)
BASOPHILS ABSOLUTE: 0.1 K/UL (ref 0–0.2)
BASOPHILS RELATIVE PERCENT: 1.3 %
BILIRUB SERPL-MCNC: 4.2 MG/DL (ref 0–1)
BILIRUBIN DIRECT: 2.1 MG/DL (ref 0–0.3)
BILIRUBIN, INDIRECT: 2.1 MG/DL (ref 0–1)
CALCIUM IONIZED: 1.29 MMOL/L (ref 1.12–1.32)
CO2: 19 MMOL/L (ref 21–32)
EOSINOPHILS ABSOLUTE: 0.4 K/UL (ref 0–0.6)
EOSINOPHILS RELATIVE PERCENT: 4.5 %
GFR AFRICAN AMERICAN: >60
GFR NON-AFRICAN AMERICAN: >60
GLUCOSE BLD-MCNC: 118 MG/DL (ref 70–99)
HCO3 VENOUS: 17.8 MMOL/L (ref 23–29)
HCT VFR BLD CALC: 25.9 % (ref 40.5–52.5)
HEMOGLOBIN: 8.8 G/DL (ref 13.5–17.5)
INR BLD: 1.77 (ref 0.86–1.14)
LACTATE: 2.2 MMOL/L (ref 0.4–2)
LIPASE: 38 U/L (ref 13–60)
LYMPHOCYTES ABSOLUTE: 1.5 K/UL (ref 1–5.1)
LYMPHOCYTES RELATIVE PERCENT: 17.4 %
MCH RBC QN AUTO: 36.2 PG (ref 26–34)
MCHC RBC AUTO-ENTMCNC: 33.9 G/DL (ref 31–36)
MCV RBC AUTO: 106.8 FL (ref 80–100)
MONOCYTES ABSOLUTE: 1 K/UL (ref 0–1.3)
MONOCYTES RELATIVE PERCENT: 11.5 %
NEUTROPHILS ABSOLUTE: 5.7 K/UL (ref 1.7–7.7)
NEUTROPHILS RELATIVE PERCENT: 65.3 %
O2 SAT, VEN: 30 %
PCO2, VEN: 33.4 MM HG (ref 40–50)
PDW BLD-RTO: 15.6 % (ref 12.4–15.4)
PERFORMED ON: ABNORMAL
PERFORMED ON: ABNORMAL
PH VENOUS: 7.33 (ref 7.35–7.45)
PLATELET # BLD: 399 K/UL (ref 135–450)
PMV BLD AUTO: 7.4 FL (ref 5–10.5)
PO2, VEN: 20 MM HG
POC ANION GAP: 13 (ref 10–20)
POC BUN: 7 MG/DL (ref 7–18)
POC CHLORIDE: 108 MMOL/L (ref 99–110)
POC CREATININE: 0.9 MG/DL (ref 0.9–1.3)
POC POTASSIUM: 3.6 MMOL/L (ref 3.5–5.1)
POC SAMPLE TYPE: ABNORMAL
POC SAMPLE TYPE: ABNORMAL
POC SODIUM: 140 MMOL/L (ref 136–145)
PROTHROMBIN TIME: 20.2 SEC (ref 9.8–13)
RBC # BLD: 2.43 M/UL (ref 4.2–5.9)
TCO2 CALC VENOUS: 19 MMOL/L
TOTAL PROTEIN: 7.5 G/DL (ref 6.4–8.2)
WBC # BLD: 8.7 K/UL (ref 4–11)

## 2018-09-17 PROCEDURE — G8427 DOCREV CUR MEDS BY ELIG CLIN: HCPCS | Performed by: INTERNAL MEDICINE

## 2018-09-17 PROCEDURE — 2709999900 US GUIDED PARACENTESIS

## 2018-09-17 PROCEDURE — 83605 ASSAY OF LACTIC ACID: CPT

## 2018-09-17 PROCEDURE — 85730 THROMBOPLASTIN TIME PARTIAL: CPT

## 2018-09-17 PROCEDURE — 80047 BASIC METABLC PNL IONIZED CA: CPT

## 2018-09-17 PROCEDURE — P9041 ALBUMIN (HUMAN),5%, 50ML: HCPCS | Performed by: EMERGENCY MEDICINE

## 2018-09-17 PROCEDURE — 1036F TOBACCO NON-USER: CPT | Performed by: INTERNAL MEDICINE

## 2018-09-17 PROCEDURE — 82803 BLOOD GASES ANY COMBINATION: CPT

## 2018-09-17 PROCEDURE — 6360000002 HC RX W HCPCS: Performed by: EMERGENCY MEDICINE

## 2018-09-17 PROCEDURE — 85610 PROTHROMBIN TIME: CPT

## 2018-09-17 PROCEDURE — G8420 CALC BMI NORM PARAMETERS: HCPCS | Performed by: INTERNAL MEDICINE

## 2018-09-17 PROCEDURE — 99215 OFFICE O/P EST HI 40 MIN: CPT | Performed by: INTERNAL MEDICINE

## 2018-09-17 PROCEDURE — 80076 HEPATIC FUNCTION PANEL: CPT

## 2018-09-17 PROCEDURE — 1111F DSCHRG MED/CURRENT MED MERGE: CPT | Performed by: INTERNAL MEDICINE

## 2018-09-17 PROCEDURE — 85025 COMPLETE CBC W/AUTO DIFF WBC: CPT

## 2018-09-17 PROCEDURE — 99284 EMERGENCY DEPT VISIT MOD MDM: CPT

## 2018-09-17 PROCEDURE — 3017F COLORECTAL CA SCREEN DOC REV: CPT | Performed by: INTERNAL MEDICINE

## 2018-09-17 PROCEDURE — 96365 THER/PROPH/DIAG IV INF INIT: CPT

## 2018-09-17 PROCEDURE — 83690 ASSAY OF LIPASE: CPT

## 2018-09-17 RX ORDER — ALBUMIN, HUMAN INJ 5% 5 %
37.5 SOLUTION INTRAVENOUS ONCE
Status: COMPLETED | OUTPATIENT
Start: 2018-09-17 | End: 2018-09-17

## 2018-09-17 RX ORDER — ALBUMIN (HUMAN) 12.5 G/50ML
37.5 SOLUTION INTRAVENOUS ONCE
Status: DISCONTINUED | OUTPATIENT
Start: 2018-09-17 | End: 2018-09-17

## 2018-09-17 RX ADMIN — ALBUMIN (HUMAN) 37.5 G: 12.5 INJECTION, SOLUTION INTRAVENOUS at 19:43

## 2018-09-17 ASSESSMENT — PAIN SCALES - GENERAL: PAINLEVEL_OUTOF10: 10

## 2018-09-17 ASSESSMENT — ENCOUNTER SYMPTOMS
SHORTNESS OF BREATH: 1
ABDOMINAL DISTENTION: 1
ABDOMINAL PAIN: 1
TROUBLE SWALLOWING: 0
ABDOMINAL DISTENTION: 1
VOMITING: 0
NAUSEA: 0
DIARRHEA: 0

## 2018-09-17 ASSESSMENT — PAIN DESCRIPTION - LOCATION: LOCATION: ABDOMEN

## 2018-09-17 NOTE — PROGRESS NOTES
SUBJECTIVE:    Patient ID: Cedric Boast is an 61 y.o. male. HPI: Patient here today for the f/u of chronic problems -- see Problem List and associated comments. New issues or complaints include (also see Assessment for more details):  Patient brought in today by his mother. Discharge from the hospital and he just spent 2 weeks in a rehab facility. He feels like his abdomen is swelling again and is now larger than it was prior to when he had several liters tapped off. He is also complaining of some abdominal pain, but as a historian it is difficult to get accurate information. He also complains of a painful right hematoma over his upper right backworse when putting pressure or laying against it. He's also had some nosebleeds. Taking ensure for main nutritional supplement. Denies alcohol. He feels like he is deteriorating since he left the rehab facility. The exacerbation of the patient's healthcare problems and issues have reached a point of significantly causing a deterioration of the patient's health and wellbeing, and substantially increasing their morbidity. 40 minutes spent with patient and family/caregivers discussing diagnoses and plan; at least 50% of which was for care coordination. Review of Systems   Constitutional: Positive for appetite change. Negative for chills and fever. HENT: Positive for nosebleeds. Negative for trouble swallowing. Respiratory: Positive for shortness of breath. Cardiovascular: Negative for chest pain. Gastrointestinal: Positive for abdominal distention and abdominal pain. Genitourinary: Negative for decreased urine volume. Neurological: Negative for light-headedness. Hematological: Bruises/bleeds easily. OBJECTIVE:    /70 (Site: Left Upper Arm, Position: Sitting, Cuff Size: Medium Adult)   Pulse 104   Ht 6' 1\" (1.854 m)   Wt 156 lb (70.8 kg)   BMI 20.58 kg/m²      Physical Exam   Constitutional: He appears well-developed. Poorly nourished. Looks ill. Eyes: No scleral icterus. Neck: No JVD present. Cardiovascular: Normal rate, regular rhythm and normal heart sounds. Pulmonary/Chest: Effort normal and breath sounds normal. No stridor. No respiratory distress. Abdominal: He exhibits ascites. There is tenderness in the right upper quadrant. Musculoskeletal: He exhibits no edema. Right upper back near medial scapulalarge tender hematoma   Neurological: He is alert. Skin: He is not diaphoretic. There is pallor. Psychiatric: His speech is normal. He is not agitated, not aggressive, not hyperactive, not withdrawn and not actively hallucinating. Thought content is not delusional. He exhibits a depressed mood. ASSESSMENT:       Encounter Diagnoses   Name Primary?  Ascites due to alcoholic cirrhosis (HCC)     Traumatic hematoma of right shoulder, subsequent encounter     Severe malnutrition (HCC)     Liver disease, chronic, due to alcohol ([address]        Ascites due to alcoholic cirrhosis ([address]  Patient was reaccumulation of ascites. Difficult to get accurate history but he seems to complain of some more pain in his abdomen. He feels it is larger than it was previously 5 L was tapped off. Refer to ER at this timediscussed with ER physician. Traumatic hematoma of right shoulder  Hematoma right shoulder still present. Over right scapula. Tender if he presses against it. May be stable in size. Continue to monitor. Severe malnutrition ([address]  Basic nutrition is from ensure    Liver disease, chronic, due to alcohol [facility])  Patient denies further alcohol use over the last 9 months. His meld score was 29 when hospitalized. PLAN:  See ASSESSMENT for evaluation & PLAN     No orders of the defined types were placed in this encounter. PSH, PMH, SH and FH reviewed and noted. Recent and past labs, tests and consults also reviewed. Recent or new meds also reviewed.

## 2018-09-17 NOTE — ASSESSMENT & PLAN NOTE
Hematoma right shoulder still present. Over right scapula. Tender if he presses against it. May be stable in size. Continue to monitor.

## 2018-09-17 NOTE — ED PROVIDER NOTES
Take 0.5 tablets by mouth daily    FUROSEMIDE (LASIX) 20 MG TABLET    Take 1 tablet by mouth daily    MIRTAZAPINE (REMERON) 7.5 MG TABLET    Take 1 tablet by mouth nightly    PANTOPRAZOLE (PROTONIX) 40 MG TABLET    Take 1 tablet by mouth daily    POTASSIUM CHLORIDE (KLOR-CON) 10 MEQ EXTENDED RELEASE TABLET        RIFAXIMIN (XIFAXAN) 550 MG TABLET    Take 1 tablet by mouth 2 times daily    SPIRONOLACTONE (ALDACTONE) 25 MG TABLET    Take 1 tablet by mouth daily    SPIRONOLACTONE (ALDACTONE) 50 MG TABLET    Take 1 tablet by mouth daily       Allergies     He has No Known Allergies. Physical Exam     INITIAL VITALS: BP: 115/76, Temp: 98 °F (36.7 °C), Pulse: 78, Resp: 18, SpO2: 98 %   Physical Exam  Constitutional: Thin, somewhat cachectic male who appears in no acute distress. HEENT: NC/AT. PERRL 4-2 bilaterally. EOMI. CV:Heart is regular rate and rhythm without murmurs, rubs or gallops. Resp: Respirations unlabored. Lungs clear to auscultation w/o wheezing. Abd: Abdomen is tense and distended with positive fluid wave. There is no rebound or guarding. MSK: Full ROM, no edema or tenderness to palpation. Skin: Extremities are warm and well perfused. 2+ radial pulses . Cap Refill <3 seconds. Neuro: A&Ox3. Cranial nerves grossly intact   Strength and sensation intact x4 extremities. Psych:  Thought content, behavior & judgement normal.    Diagnostic Results         RADIOLOGY:  US GUIDED PARACENTESIS    (Results Pending)       LABS:   Results for orders placed or performed during the hospital encounter of 09/17/18   CBC auto differential   Result Value Ref Range    WBC 8.7 4.0 - 11.0 K/uL    RBC 2.43 (L) 4.20 - 5.90 M/uL    Hemoglobin 8.8 (L) 13.5 - 17.5 g/dL    Hematocrit 25.9 (L) 40.5 - 52.5 %    .8 (H) 80.0 - 100.0 fL    MCH 36.2 (H) 26.0 - 34.0 pg    MCHC 33.9 31.0 - 36.0 g/dL    RDW 15.6 (H) 12.4 - 15.4 %    Platelets 286 840 - 898 K/uL    MPV 7.4 5.0 - 10.5 fL    Neutrophils % 65.3 %    Lymphocytes % 17.4 %    Monocytes % 11.5 %    Eosinophils % 4.5 %    Basophils % 1.3 %    Neutrophils # 5.7 1.7 - 7.7 K/uL    Lymphocytes # 1.5 1.0 - 5.1 K/uL    Monocytes # 1.0 0.0 - 1.3 K/uL    Eosinophils # 0.4 0.0 - 0.6 K/uL    Basophils # 0.1 0.0 - 0.2 K/uL   PT - INR   Result Value Ref Range    Protime 20.2 (H) 9.8 - 13.0 sec    INR 1.77 (H) 0.86 - 1.14   PTT   Result Value Ref Range    aPTT 41.4 (H) 26.0 - 36.0 sec   Lipase   Result Value Ref Range    Lipase 38.0 13.0 - 60.0 U/L   Hepatic function panel (LFTs)   Result Value Ref Range    Total Protein 7.5 6.4 - 8.2 g/dL    Alb 2.9 (L) 3.4 - 5.0 g/dL    Alkaline Phosphatase 209 (H) 40 - 129 U/L    ALT 12 10 - 40 U/L    AST 44 (H) 15 - 37 U/L    Total Bilirubin 4.2 (H) 0.0 - 1.0 mg/dL    Bilirubin, Direct 2.1 (H) 0.0 - 0.3 mg/dL    Bilirubin, Indirect 2.1 (H) 0.0 - 1.0 mg/dL   POCT Venous   Result Value Ref Range    POC Sodium 140 136 - 145 mmol/L    POC Potassium 3.6 3.5 - 5.1 mmol/L    POC Chloride 108 99 - 110 mmol/L    CO2 19 (L) 21 - 32 mmol/L    POC Anion Gap 13 10 - 20    POC Glucose 118 (H) 70 - 99 mg/dl    POC BUN 7 7 - 18 mg/dL    POC Creatinine 0.9 0.9 - 1.3 mg/dL    GFR Non-African American >60 >60    GFR African American >60     Calcium, Ion 1.29 1.12 - 1.32 mmol/L    Sample Type MARLIN     Performed on SEE BELOW    POCT Venous   Result Value Ref Range    pH, Marlin 7.334 (L) 7.350 - 7.450    pCO2, Marlin 33.4 (L) 40.0 - 50.0 mm Hg    pO2, Marlin 20 Not Established mm Hg    HCO3, Venous 17.8 (L) 23.0 - 29.0 mmol/L    Base Excess, Marlin -8 (L) -3 - 3    O2 Sat, Marlin 30 Not Established %    TC02 (Calc), Marlin 19 Not Established mmol/L    Lactate 2.20 (H) 0.40 - 2.00 mmol/L    Sample Type MARLIN     Performed on SEE BELOW            RECENT VITALS:  BP: 95/63, Temp: 98 °F (36.7 °C), Pulse: 83, Resp: 16, SpO2: 94 %     Procedures         ED Course     Nursing Notes, Past Medical Hx, Past Surgical Hx, Social Hx, Allergies, and Family Hx were reviewed.     The

## 2018-09-21 ENCOUNTER — TELEPHONE (OUTPATIENT)
Dept: INTERNAL MEDICINE | Age: 58
End: 2018-09-21

## 2018-09-21 RX ORDER — LIDOCAINE 50 MG/G
1 PATCH TOPICAL DAILY
Qty: 30 PATCH | Refills: 0 | Status: SHIPPED | OUTPATIENT
Start: 2018-09-21 | End: 2018-10-02 | Stop reason: ALTCHOICE

## 2018-09-21 NOTE — TELEPHONE ENCOUNTER
Patient's Nephrologist suggested he gets paracentesis every 4 weeks. Patient had it drained 9/17 at emergency and patient would like to know if there can be a standing order in place so he when he goes to [facility] they will know what to do.

## 2018-09-25 ENCOUNTER — TELEPHONE (OUTPATIENT)
Dept: FAMILY MEDICINE CLINIC | Age: 58
End: 2018-09-25

## 2018-10-02 ENCOUNTER — HOSPITAL ENCOUNTER (OUTPATIENT)
Dept: ULTRASOUND IMAGING | Age: 58
Discharge: HOME | End: 2018-10-02
Payer: COMMERCIAL

## 2018-10-02 VITALS
DIASTOLIC BLOOD PRESSURE: 73 MMHG | SYSTOLIC BLOOD PRESSURE: 110 MMHG | HEART RATE: 83 BPM | OXYGEN SATURATION: 100 % | TEMPERATURE: 97.7 F | RESPIRATION RATE: 16 BRPM

## 2018-10-02 DIAGNOSIS — K70.31 ASCITES DUE TO ALCOHOLIC CIRRHOSIS (HCC): ICD-10-CM

## 2018-10-02 LAB
APPEARANCE FLUID: CLEAR
CELL COUNT FLUID TYPE: NORMAL
CLOT EVALUATION: NORMAL
COLOR FLUID: NORMAL
LYMPHOCYTES, BODY FLUID: 47 %
MACROPHAGE FLUID: 19 %
MONOCYTE, FLUID: 28 %
NEUTROPHIL, FLUID: 6 %
NUCLEATED CELLS FLUID: 56 /CUMM
NUMBER OF CELLS COUNTED FLUID: 100
RBC FLUID: 30 /CUMM

## 2018-10-02 PROCEDURE — 49083 ABD PARACENTESIS W/IMAGING: CPT

## 2018-10-02 PROCEDURE — 6360000002 HC RX W HCPCS: Performed by: INTERNAL MEDICINE

## 2018-10-02 PROCEDURE — 7100000011 HC PHASE II RECOVERY - ADDTL 15 MIN

## 2018-10-02 PROCEDURE — 7100000010 HC PHASE II RECOVERY - FIRST 15 MIN

## 2018-10-02 PROCEDURE — 36415 COLL VENOUS BLD VENIPUNCTURE: CPT

## 2018-10-02 PROCEDURE — 89051 BODY FLUID CELL COUNT: CPT

## 2018-10-02 PROCEDURE — P9047 ALBUMIN (HUMAN), 25%, 50ML: HCPCS | Performed by: INTERNAL MEDICINE

## 2018-10-02 RX ORDER — ALBUMIN (HUMAN) 12.5 G/50ML
50 SOLUTION INTRAVENOUS ONCE
Status: COMPLETED | OUTPATIENT
Start: 2018-10-02 | End: 2018-10-02

## 2018-10-02 RX ADMIN — ALBUMIN (HUMAN) 50 G: 0.25 INJECTION, SOLUTION INTRAVENOUS at 15:17

## 2018-10-03 ENCOUNTER — TELEPHONE (OUTPATIENT)
Dept: INTERNAL MEDICINE CLINIC | Age: 58
End: 2018-10-03

## 2018-10-09 ENCOUNTER — NURSE ONLY (OUTPATIENT)
Dept: INTERNAL MEDICINE CLINIC | Age: 58
End: 2018-10-09
Payer: COMMERCIAL

## 2018-10-09 DIAGNOSIS — Z23 NEED FOR HEPATITIS A AND B VACCINATION: Primary | ICD-10-CM

## 2018-10-09 PROCEDURE — 90471 IMMUNIZATION ADMIN: CPT | Performed by: INTERNAL MEDICINE

## 2018-10-09 PROCEDURE — 90636 HEP A/HEP B VACC ADULT IM: CPT | Performed by: INTERNAL MEDICINE

## 2018-10-12 ENCOUNTER — HOSPITAL ENCOUNTER (OUTPATIENT)
Dept: ULTRASOUND IMAGING | Age: 58
Discharge: HOME | End: 2018-10-12
Payer: COMMERCIAL

## 2018-10-12 VITALS
SYSTOLIC BLOOD PRESSURE: 111 MMHG | OXYGEN SATURATION: 100 % | BODY MASS INDEX: 18.55 KG/M2 | HEART RATE: 84 BPM | WEIGHT: 140 LBS | TEMPERATURE: 98.3 F | DIASTOLIC BLOOD PRESSURE: 74 MMHG | RESPIRATION RATE: 16 BRPM | HEIGHT: 73 IN

## 2018-10-12 DIAGNOSIS — R18.8 OTHER ASCITES: ICD-10-CM

## 2018-10-12 LAB
APPEARANCE FLUID: NORMAL
BASO FLUID: 1 %
CELL COUNT FLUID TYPE: NORMAL
CLOT EVALUATION: NORMAL
COLOR FLUID: YELLOW
LYMPHOCYTES, BODY FLUID: 16 %
MACROPHAGE FLUID: 59 %
MESOTHELIAL FLUID: 6 %
NEUTROPHIL, FLUID: 16 %
NUCLEATED CELLS FLUID: 101 /CUMM
NUMBER OF CELLS COUNTED FLUID: 100
RBC FLUID: 28 /CUMM

## 2018-10-12 PROCEDURE — 49083 ABD PARACENTESIS W/IMAGING: CPT

## 2018-10-12 PROCEDURE — 6360000002 HC RX W HCPCS: Performed by: INTERNAL MEDICINE

## 2018-10-12 PROCEDURE — 89051 BODY FLUID CELL COUNT: CPT

## 2018-10-12 PROCEDURE — P9047 ALBUMIN (HUMAN), 25%, 50ML: HCPCS | Performed by: INTERNAL MEDICINE

## 2018-10-12 PROCEDURE — 7100000011 HC PHASE II RECOVERY - ADDTL 15 MIN

## 2018-10-12 PROCEDURE — 7100000010 HC PHASE II RECOVERY - FIRST 15 MIN

## 2018-10-12 PROCEDURE — 36415 COLL VENOUS BLD VENIPUNCTURE: CPT

## 2018-10-12 RX ORDER — ALBUMIN (HUMAN) 12.5 G/50ML
50 SOLUTION INTRAVENOUS ONCE
Status: COMPLETED | OUTPATIENT
Start: 2018-10-12 | End: 2018-10-12

## 2018-10-12 RX ADMIN — ALBUMIN (HUMAN) 50 G: 0.25 INJECTION, SOLUTION INTRAVENOUS at 11:54

## 2018-10-12 ASSESSMENT — PAIN - FUNCTIONAL ASSESSMENT: PAIN_FUNCTIONAL_ASSESSMENT: 0-10

## 2018-10-12 ASSESSMENT — PAIN DESCRIPTION - DESCRIPTORS: DESCRIPTORS: ACHING;PENETRATING

## 2018-10-17 ENCOUNTER — OFFICE VISIT (OUTPATIENT)
Dept: INTERNAL MEDICINE CLINIC | Age: 58
End: 2018-10-17
Payer: COMMERCIAL

## 2018-10-17 VITALS
SYSTOLIC BLOOD PRESSURE: 120 MMHG | DIASTOLIC BLOOD PRESSURE: 64 MMHG | WEIGHT: 143 LBS | BODY MASS INDEX: 18.95 KG/M2 | HEIGHT: 73 IN

## 2018-10-17 DIAGNOSIS — Z23 NEED FOR INFLUENZA VACCINATION: Primary | ICD-10-CM

## 2018-10-17 DIAGNOSIS — I10 ESSENTIAL HYPERTENSION: ICD-10-CM

## 2018-10-17 DIAGNOSIS — K70.9 LIVER DISEASE, CHRONIC, DUE TO ALCOHOL (HCC): ICD-10-CM

## 2018-10-17 DIAGNOSIS — S40.011D TRAUMATIC HEMATOMA OF RIGHT SHOULDER, SUBSEQUENT ENCOUNTER: ICD-10-CM

## 2018-10-17 PROCEDURE — G8482 FLU IMMUNIZE ORDER/ADMIN: HCPCS | Performed by: INTERNAL MEDICINE

## 2018-10-17 PROCEDURE — G8420 CALC BMI NORM PARAMETERS: HCPCS | Performed by: INTERNAL MEDICINE

## 2018-10-17 PROCEDURE — 3017F COLORECTAL CA SCREEN DOC REV: CPT | Performed by: INTERNAL MEDICINE

## 2018-10-17 PROCEDURE — 90686 IIV4 VACC NO PRSV 0.5 ML IM: CPT | Performed by: INTERNAL MEDICINE

## 2018-10-17 PROCEDURE — 90471 IMMUNIZATION ADMIN: CPT | Performed by: INTERNAL MEDICINE

## 2018-10-17 PROCEDURE — 99214 OFFICE O/P EST MOD 30 MIN: CPT | Performed by: INTERNAL MEDICINE

## 2018-10-17 PROCEDURE — G8427 DOCREV CUR MEDS BY ELIG CLIN: HCPCS | Performed by: INTERNAL MEDICINE

## 2018-10-17 PROCEDURE — 1036F TOBACCO NON-USER: CPT | Performed by: INTERNAL MEDICINE

## 2018-10-17 ASSESSMENT — ENCOUNTER SYMPTOMS
TROUBLE SWALLOWING: 0
ABDOMINAL PAIN: 0
SHORTNESS OF BREATH: 0
ABDOMINAL DISTENTION: 1

## 2018-10-17 NOTE — PROGRESS NOTES
disease, chronic, due to alcohol ([facility] [address]  Now getting paracentesis approximately every 2 weeks. 5.6 L removed last week. Taking his spironolactone. Routine follow-up with GI. Patient is still pursuing the liver transplant option. He is scheduled for further testing in approximately 2 weeks. Hypertension  BP remains okay. No Rx. Traumatic hematoma of right shoulder  Hematoma stable. It remains tender per patient but seemed benign on exam.        PLAN:See ASSESSMENT for evaluation & PLAN     Orders Placed This Encounter   Procedures    INFLUENZA, QUADV, 3 YRS AND OLDER, IM, PF, PREFILL SYR OR SDV, 0.5ML (FLUZONE QUADV, PF)       PSH, PMH, SH and FH reviewed and noted. Recent and past labs, tests and consultsalso reviewed. Recent or new meds also reviewed.

## 2018-10-26 ENCOUNTER — HOSPITAL ENCOUNTER (OUTPATIENT)
Dept: ULTRASOUND IMAGING | Age: 58
Discharge: HOME | End: 2018-10-26
Payer: COMMERCIAL

## 2018-10-26 DIAGNOSIS — R18.8 OTHER ASCITES: ICD-10-CM

## 2018-10-26 LAB
APPEARANCE FLUID: CLEAR
CELL COUNT FLUID TYPE: NORMAL
CLOT EVALUATION: NORMAL
COLOR FLUID: YELLOW
LYMPHOCYTES, BODY FLUID: 11 %
MACROPHAGE FLUID: 50 %
NEUTROPHIL, FLUID: 28 %
NUCLEATED CELLS FLUID: 144 /CUMM
NUMBER OF CELLS COUNTED FLUID: 100
RBC FLUID: 33 /CUMM

## 2018-10-26 PROCEDURE — 49083 ABD PARACENTESIS W/IMAGING: CPT

## 2018-10-26 PROCEDURE — 89051 BODY FLUID CELL COUNT: CPT

## 2018-10-26 PROCEDURE — 36415 COLL VENOUS BLD VENIPUNCTURE: CPT

## 2018-11-07 ENCOUNTER — TELEPHONE (OUTPATIENT)
Dept: INTERNAL MEDICINE CLINIC | Age: 58
End: 2018-11-07

## 2018-11-07 NOTE — TELEPHONE ENCOUNTER
I recommended referral to endocrinologist for the treatment of his osteoporosis. I recommend the endocrinologist next door-Maddie Goldstein or [facility]

## 2018-11-09 ENCOUNTER — HOSPITAL ENCOUNTER (OUTPATIENT)
Dept: ULTRASOUND IMAGING | Age: 58
Discharge: HOME | End: 2018-11-09
Payer: COMMERCIAL

## 2018-11-09 DIAGNOSIS — R18.8 OTHER ASCITES: ICD-10-CM

## 2018-11-09 PROCEDURE — 49083 ABD PARACENTESIS W/IMAGING: CPT

## 2018-11-29 ENCOUNTER — NURSE ONLY (OUTPATIENT)
Dept: INTERNAL MEDICINE CLINIC | Age: 58
End: 2018-11-29
Payer: COMMERCIAL

## 2018-11-29 DIAGNOSIS — Z23 NEED FOR HEPATITIS B VACCINATION: Primary | ICD-10-CM

## 2018-11-29 PROCEDURE — 90471 IMMUNIZATION ADMIN: CPT | Performed by: INTERNAL MEDICINE

## 2018-11-29 PROCEDURE — 90746 HEPB VACCINE 3 DOSE ADULT IM: CPT | Performed by: INTERNAL MEDICINE

## 2018-11-30 ENCOUNTER — HOSPITAL ENCOUNTER (OUTPATIENT)
Dept: ULTRASOUND IMAGING | Age: 58
Discharge: HOME | End: 2018-11-30
Payer: COMMERCIAL

## 2018-11-30 DIAGNOSIS — R18.8 OTHER ASCITES: ICD-10-CM

## 2018-11-30 PROCEDURE — 76705 ECHO EXAM OF ABDOMEN: CPT

## 2018-12-21 ENCOUNTER — HOSPITAL ENCOUNTER (OUTPATIENT)
Dept: GENERAL RADIOLOGY | Age: 58
Discharge: HOME | End: 2018-12-21
Payer: COMMERCIAL

## 2018-12-21 ENCOUNTER — HOSPITAL ENCOUNTER (OUTPATIENT)
Dept: ULTRASOUND IMAGING | Age: 58
Discharge: HOME | End: 2018-12-21
Payer: COMMERCIAL

## 2018-12-21 DIAGNOSIS — R18.8 OTHER ASCITES: ICD-10-CM

## 2018-12-21 PROCEDURE — 76705 ECHO EXAM OF ABDOMEN: CPT

## 2019-01-04 ENCOUNTER — TELEPHONE (OUTPATIENT)
Dept: INTERNAL MEDICINE CLINIC | Age: 59
End: 2019-01-04

## 2019-01-04 RX ORDER — PANTOPRAZOLE SODIUM 40 MG/1
40 TABLET, DELAYED RELEASE ORAL
Qty: 30 TABLET | Refills: 3 | Status: SHIPPED | OUTPATIENT
Start: 2019-01-04 | End: 2019-01-15 | Stop reason: SDUPTHER

## 2019-01-11 ENCOUNTER — HOSPITAL ENCOUNTER (OUTPATIENT)
Dept: ULTRASOUND IMAGING | Age: 59
Discharge: HOME | End: 2019-01-11
Payer: COMMERCIAL

## 2019-01-11 DIAGNOSIS — R18.8 CIRRHOSIS OF LIVER WITH ASCITES, UNSPECIFIED HEPATIC CIRRHOSIS TYPE (HCC): ICD-10-CM

## 2019-01-11 DIAGNOSIS — K74.60 CIRRHOSIS OF LIVER WITH ASCITES, UNSPECIFIED HEPATIC CIRRHOSIS TYPE (HCC): ICD-10-CM

## 2019-01-11 PROCEDURE — 76705 ECHO EXAM OF ABDOMEN: CPT

## 2019-01-15 ENCOUNTER — OFFICE VISIT (OUTPATIENT)
Dept: ENDOCRINOLOGY | Age: 59
End: 2019-01-15
Payer: COMMERCIAL

## 2019-01-15 VITALS
BODY MASS INDEX: 22.68 KG/M2 | RESPIRATION RATE: 16 BRPM | DIASTOLIC BLOOD PRESSURE: 71 MMHG | HEIGHT: 70 IN | SYSTOLIC BLOOD PRESSURE: 119 MMHG | HEART RATE: 96 BPM | WEIGHT: 158.4 LBS | OXYGEN SATURATION: 100 %

## 2019-01-15 DIAGNOSIS — M81.0 AGE-RELATED OSTEOPOROSIS WITHOUT CURRENT PATHOLOGICAL FRACTURE: Primary | ICD-10-CM

## 2019-01-15 DIAGNOSIS — E83.52 HYPERCALCEMIA: ICD-10-CM

## 2019-01-15 PROCEDURE — G8420 CALC BMI NORM PARAMETERS: HCPCS | Performed by: INTERNAL MEDICINE

## 2019-01-15 PROCEDURE — G8427 DOCREV CUR MEDS BY ELIG CLIN: HCPCS | Performed by: INTERNAL MEDICINE

## 2019-01-15 PROCEDURE — 1036F TOBACCO NON-USER: CPT | Performed by: INTERNAL MEDICINE

## 2019-01-15 PROCEDURE — 99204 OFFICE O/P NEW MOD 45 MIN: CPT | Performed by: INTERNAL MEDICINE

## 2019-01-15 PROCEDURE — 3017F COLORECTAL CA SCREEN DOC REV: CPT | Performed by: INTERNAL MEDICINE

## 2019-01-15 PROCEDURE — G8482 FLU IMMUNIZE ORDER/ADMIN: HCPCS | Performed by: INTERNAL MEDICINE

## 2019-01-15 RX ORDER — ZOLEDRONIC ACID 5 MG/100ML
5 INJECTION, SOLUTION INTRAVENOUS ONCE
Qty: 100 ML | Refills: 0 | Status: SHIPPED | OUTPATIENT
Start: 2019-01-15 | End: 2019-03-25 | Stop reason: SDUPTHER

## 2019-01-15 RX ORDER — ACETAMINOPHEN 500 MG
650 TABLET ORAL
COMMUNITY
End: 2020-06-16

## 2019-01-15 RX ORDER — ZOLPIDEM TARTRATE 6.25 MG/1
5 TABLET, FILM COATED, EXTENDED RELEASE ORAL
COMMUNITY
End: 2019-06-12

## 2019-02-01 ENCOUNTER — HOSPITAL ENCOUNTER (OUTPATIENT)
Dept: ULTRASOUND IMAGING | Age: 59
Discharge: HOME | End: 2019-02-01
Payer: COMMERCIAL

## 2019-02-01 DIAGNOSIS — R18.8 OTHER ASCITES: ICD-10-CM

## 2019-02-01 PROCEDURE — 76705 ECHO EXAM OF ABDOMEN: CPT

## 2019-02-13 ENCOUNTER — TELEPHONE (OUTPATIENT)
Dept: ENDOCRINOLOGY | Age: 59
End: 2019-02-13

## 2019-02-13 DIAGNOSIS — M81.0 AGE-RELATED OSTEOPOROSIS WITHOUT CURRENT PATHOLOGICAL FRACTURE: ICD-10-CM

## 2019-02-13 DIAGNOSIS — E83.52 HYPERCALCEMIA: ICD-10-CM

## 2019-02-13 LAB
24HR URINE VOLUME (ML): 1200 ML
A/G RATIO: 1.1 (ref 1.1–2.2)
ALBUMIN SERPL-MCNC: 3.7 G/DL (ref 3.4–5)
ALP BLD-CCNC: 147 U/L (ref 40–129)
ALT SERPL-CCNC: 20 U/L (ref 10–40)
ANION GAP SERPL CALCULATED.3IONS-SCNC: 14 MMOL/L (ref 3–16)
AST SERPL-CCNC: 41 U/L (ref 15–37)
BILIRUB SERPL-MCNC: 0.6 MG/DL (ref 0–1)
BUN BLDV-MCNC: 8 MG/DL (ref 7–20)
CALCIUM 24 HOUR URINE: 130 MG/24 HR (ref 42–353)
CALCIUM SERPL-MCNC: 10.2 MG/DL (ref 8.3–10.6)
CHLORIDE BLD-SCNC: 108 MMOL/L (ref 99–110)
CO2: 21 MMOL/L (ref 21–32)
CREAT SERPL-MCNC: 1.2 MG/DL (ref 0.9–1.3)
CREATININE 24 HOUR URINE: 1.2 G/24HR (ref 0.6–2.5)
FOLLICLE STIMULATING HORMONE: 9.2 MIU/ML
GFR AFRICAN AMERICAN: >60
GFR NON-AFRICAN AMERICAN: >60
GLOBULIN: 3.3 G/DL
GLUCOSE BLD-MCNC: 142 MG/DL (ref 70–99)
LUTEINIZING HORMONE: 7.2 MIU/ML
PARATHYROID HORMONE INTACT: 6 PG/ML (ref 14–72)
POTASSIUM SERPL-SCNC: 3.9 MMOL/L (ref 3.5–5.1)
SODIUM BLD-SCNC: 143 MMOL/L (ref 136–145)
TOTAL PROTEIN: 7 G/DL (ref 6.4–8.2)
TSH REFLEX: 1.91 UIU/ML (ref 0.27–4.2)
VITAMIN D 25-HYDROXY: 42.6 NG/ML

## 2019-02-15 LAB
SEX HORMONE BINDING GLOBULIN: 180 NMOL/L (ref 11–80)
TESTOSTERONE FREE-NONMALE: 82.4 PG/ML (ref 47–244)
TESTOSTERONE TOTAL: 1347 NG/DL (ref 220–1000)

## 2019-02-19 PROBLEM — K70.31 ALCOHOLIC CIRRHOSIS OF LIVER WITH ASCITES (HCC): Status: ACTIVE | Noted: 2019-02-19

## 2019-03-19 ENCOUNTER — TELEPHONE (OUTPATIENT)
Dept: ENDOCRINOLOGY | Age: 59
End: 2019-03-19

## 2019-03-21 ENCOUNTER — TELEPHONE (OUTPATIENT)
Dept: ENDOCRINOLOGY | Age: 59
End: 2019-03-21

## 2019-03-22 ENCOUNTER — TELEPHONE (OUTPATIENT)
Dept: ENDOCRINOLOGY | Age: 59
End: 2019-03-22

## 2019-03-25 ENCOUNTER — TELEPHONE (OUTPATIENT)
Dept: ENDOCRINOLOGY | Age: 59
End: 2019-03-25

## 2019-03-25 DIAGNOSIS — M81.0 AGE-RELATED OSTEOPOROSIS WITHOUT CURRENT PATHOLOGICAL FRACTURE: Primary | ICD-10-CM

## 2019-03-25 RX ORDER — ZOLEDRONIC ACID 5 MG/100ML
5 INJECTION, SOLUTION INTRAVENOUS ONCE
Qty: 100 ML | Refills: 0 | Status: SHIPPED | OUTPATIENT
Start: 2019-03-25 | End: 2020-06-16 | Stop reason: ALTCHOICE

## 2019-03-26 RX ORDER — SODIUM CHLORIDE 0.9 % (FLUSH) 0.9 %
5 SYRINGE (ML) INJECTION PRN
Status: CANCELLED | OUTPATIENT
Start: 2019-03-26

## 2019-03-26 RX ORDER — DIPHENHYDRAMINE HYDROCHLORIDE 50 MG/ML
50 INJECTION INTRAMUSCULAR; INTRAVENOUS ONCE
Status: CANCELLED | OUTPATIENT
Start: 2019-03-26 | End: 2019-03-26

## 2019-03-26 RX ORDER — 0.9 % SODIUM CHLORIDE 0.9 %
10 VIAL (ML) INJECTION ONCE
Status: CANCELLED | OUTPATIENT
Start: 2019-03-26 | End: 2019-03-26

## 2019-03-26 RX ORDER — HEPARIN SODIUM (PORCINE) LOCK FLUSH IV SOLN 100 UNIT/ML 100 UNIT/ML
500 SOLUTION INTRAVENOUS PRN
Status: CANCELLED | OUTPATIENT
Start: 2019-03-26

## 2019-03-26 RX ORDER — SODIUM CHLORIDE 0.9 % (FLUSH) 0.9 %
10 SYRINGE (ML) INJECTION PRN
Status: CANCELLED | OUTPATIENT
Start: 2019-03-26

## 2019-03-26 RX ORDER — ZOLEDRONIC ACID 5 MG/100ML
5 INJECTION, SOLUTION INTRAVENOUS ONCE
Status: CANCELLED | OUTPATIENT
Start: 2019-03-26 | End: 2019-03-26

## 2019-03-26 RX ORDER — EPINEPHRINE 1 MG/ML
0.3 INJECTION, SOLUTION, CONCENTRATE INTRAVENOUS PRN
Status: CANCELLED | OUTPATIENT
Start: 2019-03-26

## 2019-03-26 RX ORDER — SODIUM CHLORIDE 9 MG/ML
INJECTION, SOLUTION INTRAVENOUS CONTINUOUS
Status: CANCELLED | OUTPATIENT
Start: 2019-03-26

## 2019-03-26 RX ORDER — METHYLPREDNISOLONE SODIUM SUCCINATE 125 MG/2ML
125 INJECTION, POWDER, LYOPHILIZED, FOR SOLUTION INTRAMUSCULAR; INTRAVENOUS ONCE
Status: CANCELLED | OUTPATIENT
Start: 2019-03-26 | End: 2019-03-26

## 2019-04-11 ENCOUNTER — HOSPITAL ENCOUNTER (OUTPATIENT)
Dept: ONCOLOGY | Age: 59
Setting detail: INFUSION SERIES
Discharge: HOME | End: 2019-04-11
Payer: COMMERCIAL

## 2019-04-11 VITALS
RESPIRATION RATE: 18 BRPM | HEART RATE: 88 BPM | SYSTOLIC BLOOD PRESSURE: 127 MMHG | DIASTOLIC BLOOD PRESSURE: 79 MMHG | TEMPERATURE: 97.3 F

## 2019-04-11 PROCEDURE — 6360000002 HC RX W HCPCS: Performed by: INTERNAL MEDICINE

## 2019-04-11 PROCEDURE — 99201 HC NEW PT, OUTPT VISIT LEVEL 1: CPT

## 2019-04-11 PROCEDURE — 96365 THER/PROPH/DIAG IV INF INIT: CPT

## 2019-04-11 RX ORDER — M-VIT,TX,IRON,MINS/CALC/FOLIC 27MG-0.4MG
1 TABLET ORAL DAILY
COMMUNITY

## 2019-04-11 RX ORDER — ZOLEDRONIC ACID 5 MG/100ML
5 INJECTION, SOLUTION INTRAVENOUS ONCE
Status: COMPLETED | OUTPATIENT
Start: 2019-04-11 | End: 2019-04-11

## 2019-04-11 RX ADMIN — ZOLEDRONIC ACID 5 MG: 5 INJECTION, SOLUTION INTRAVENOUS at 12:50

## 2019-04-11 NOTE — PLAN OF CARE
Problem: SAFETY  Goal: Free from accidental physical injury  Note:     Explained fall risk precautions to pt & mom and rationale behind their use to keep the patient safe. Belongings are in reach. Pt encouraged to notify staff for any and all assistance. Staff present in tx suite throughout entirety of pts treatment to monitor and protect from falls. Assistance provided when ambulating to restroom utilizing Stay With Me. Problem: KNOWLEDGE DEFICIT  Goal: Patient/S.O. demonstrates understanding of disease process, treatment plan, medications, and discharge instructions. Note:   Pt seen and assessed at 0 [facility] for Reclast infusion per orders from Dr. Diego Mcconnell. Infused per [facility] policy. Monitoring completed for infusion reactions - see flowsheet. Pt tolerated infusion well and without incident. Pt verbalizes understanding of discharge instructions. Discharged ambulatory to home with mom.

## 2019-04-11 NOTE — PROGRESS NOTES
Patient arrived ambulatory to outpatient infusion for Reclast infusion. Drug info given to soumya prior to infusion. VSS. PIV placed without difficulty.

## 2019-05-22 ENCOUNTER — TELEPHONE (OUTPATIENT)
Dept: INTERNAL MEDICINE CLINIC | Age: 59
End: 2019-05-22

## 2019-05-22 RX ORDER — PANTOPRAZOLE SODIUM 40 MG/1
TABLET, DELAYED RELEASE ORAL
Qty: 30 TABLET | Refills: 2 | Status: SHIPPED | OUTPATIENT
Start: 2019-05-22 | End: 2019-08-19 | Stop reason: SDUPTHER

## 2019-05-23 ENCOUNTER — TELEPHONE (OUTPATIENT)
Dept: INTERNAL MEDICINE CLINIC | Age: 59
End: 2019-05-23

## 2019-05-23 DIAGNOSIS — Z12.5 SCREENING PSA (PROSTATE SPECIFIC ANTIGEN): ICD-10-CM

## 2019-05-23 DIAGNOSIS — R73.9 HYPERGLYCEMIA: ICD-10-CM

## 2019-05-23 DIAGNOSIS — E78.5 HYPERLIPIDEMIA, UNSPECIFIED HYPERLIPIDEMIA TYPE: Primary | ICD-10-CM

## 2019-05-23 DIAGNOSIS — I10 ESSENTIAL HYPERTENSION: ICD-10-CM

## 2019-05-23 DIAGNOSIS — Z00.00 PREVENTATIVE HEALTH CARE: ICD-10-CM

## 2019-06-04 DIAGNOSIS — R73.9 HYPERGLYCEMIA: ICD-10-CM

## 2019-06-04 DIAGNOSIS — Z12.5 SCREENING PSA (PROSTATE SPECIFIC ANTIGEN): ICD-10-CM

## 2019-06-04 DIAGNOSIS — Z00.00 PREVENTATIVE HEALTH CARE: ICD-10-CM

## 2019-06-04 DIAGNOSIS — E78.5 HYPERLIPIDEMIA, UNSPECIFIED HYPERLIPIDEMIA TYPE: ICD-10-CM

## 2019-06-04 DIAGNOSIS — I10 ESSENTIAL HYPERTENSION: ICD-10-CM

## 2019-06-04 LAB
A/G RATIO: 1.5 (ref 1.1–2.2)
ALBUMIN SERPL-MCNC: 4.9 G/DL (ref 3.4–5)
ALP BLD-CCNC: 112 U/L (ref 40–129)
ALT SERPL-CCNC: 21 U/L (ref 10–40)
ANION GAP SERPL CALCULATED.3IONS-SCNC: 16 MMOL/L (ref 3–16)
AST SERPL-CCNC: 32 U/L (ref 15–37)
BASOPHILS ABSOLUTE: 0.1 K/UL (ref 0–0.2)
BASOPHILS RELATIVE PERCENT: 1.4 %
BILIRUB SERPL-MCNC: 0.5 MG/DL (ref 0–1)
BILIRUBIN URINE: NEGATIVE
BLOOD, URINE: NEGATIVE
BUN BLDV-MCNC: 13 MG/DL (ref 7–20)
CALCIUM SERPL-MCNC: 9.8 MG/DL (ref 8.3–10.6)
CHLORIDE BLD-SCNC: 109 MMOL/L (ref 99–110)
CHOLESTEROL, TOTAL: 205 MG/DL (ref 0–199)
CLARITY: CLEAR
CO2: 18 MMOL/L (ref 21–32)
COLOR: YELLOW
CREAT SERPL-MCNC: 1.2 MG/DL (ref 0.9–1.3)
EOSINOPHILS ABSOLUTE: 0.5 K/UL (ref 0–0.6)
EOSINOPHILS RELATIVE PERCENT: 7.1 %
GFR AFRICAN AMERICAN: >60
GFR NON-AFRICAN AMERICAN: >60
GLOBULIN: 3.3 G/DL
GLUCOSE BLD-MCNC: 94 MG/DL (ref 70–99)
GLUCOSE URINE: NEGATIVE MG/DL
HCT VFR BLD CALC: 40 % (ref 40.5–52.5)
HDLC SERPL-MCNC: 64 MG/DL (ref 40–60)
HEMOGLOBIN: 13.3 G/DL (ref 13.5–17.5)
KETONES, URINE: NEGATIVE MG/DL
LDL CHOLESTEROL CALCULATED: 103 MG/DL
LEUKOCYTE ESTERASE, URINE: NEGATIVE
LYMPHOCYTES ABSOLUTE: 1.7 K/UL (ref 1–5.1)
LYMPHOCYTES RELATIVE PERCENT: 24.7 %
MCH RBC QN AUTO: 33.7 PG (ref 26–34)
MCHC RBC AUTO-ENTMCNC: 33.2 G/DL (ref 31–36)
MCV RBC AUTO: 101.7 FL (ref 80–100)
MICROSCOPIC EXAMINATION: NORMAL
MONOCYTES ABSOLUTE: 0.8 K/UL (ref 0–1.3)
MONOCYTES RELATIVE PERCENT: 11.1 %
NEUTROPHILS ABSOLUTE: 3.8 K/UL (ref 1.7–7.7)
NEUTROPHILS RELATIVE PERCENT: 55.7 %
NITRITE, URINE: NEGATIVE
PDW BLD-RTO: 13.8 % (ref 12.4–15.4)
PH UA: 7 (ref 5–8)
PLATELET # BLD: 281 K/UL (ref 135–450)
PMV BLD AUTO: 8.6 FL (ref 5–10.5)
POTASSIUM SERPL-SCNC: 5.1 MMOL/L (ref 3.5–5.1)
PROSTATE SPECIFIC ANTIGEN: 0.42 NG/ML (ref 0–4)
PROTEIN UA: NEGATIVE MG/DL
RBC # BLD: 3.93 M/UL (ref 4.2–5.9)
SODIUM BLD-SCNC: 143 MMOL/L (ref 136–145)
SPECIFIC GRAVITY UA: 1.02 (ref 1–1.03)
TOTAL PROTEIN: 8.2 G/DL (ref 6.4–8.2)
TRIGL SERPL-MCNC: 189 MG/DL (ref 0–150)
TSH REFLEX FT4: 2.58 UIU/ML (ref 0.27–4.2)
URINE TYPE: NORMAL
UROBILINOGEN, URINE: 0.2 E.U./DL
VLDLC SERPL CALC-MCNC: 38 MG/DL
WBC # BLD: 6.8 K/UL (ref 4–11)

## 2019-06-05 LAB
ESTIMATED AVERAGE GLUCOSE: 105.4 MG/DL
HBA1C MFR BLD: 5.3 %

## 2019-06-12 ENCOUNTER — OFFICE VISIT (OUTPATIENT)
Dept: INTERNAL MEDICINE CLINIC | Age: 59
End: 2019-06-12
Payer: COMMERCIAL

## 2019-06-12 VITALS
HEIGHT: 70 IN | SYSTOLIC BLOOD PRESSURE: 130 MMHG | OXYGEN SATURATION: 93 % | DIASTOLIC BLOOD PRESSURE: 80 MMHG | WEIGHT: 174 LBS | BODY MASS INDEX: 24.91 KG/M2 | HEART RATE: 89 BPM

## 2019-06-12 DIAGNOSIS — E78.5 HYPERLIPIDEMIA, UNSPECIFIED HYPERLIPIDEMIA TYPE: ICD-10-CM

## 2019-06-12 DIAGNOSIS — F10.11 HISTORY OF ALCOHOL ABUSE: ICD-10-CM

## 2019-06-12 DIAGNOSIS — I10 ESSENTIAL HYPERTENSION: ICD-10-CM

## 2019-06-12 DIAGNOSIS — Z00.00 PREVENTATIVE HEALTH CARE: Primary | ICD-10-CM

## 2019-06-12 DIAGNOSIS — Z23 NEED FOR PNEUMOCOCCAL VACCINATION: ICD-10-CM

## 2019-06-12 DIAGNOSIS — E43 SEVERE MALNUTRITION (HCC): ICD-10-CM

## 2019-06-12 DIAGNOSIS — K70.9 LIVER DISEASE, CHRONIC, DUE TO ALCOHOL (HCC): ICD-10-CM

## 2019-06-12 LAB
HEMOCCULT STL QL: NORMAL

## 2019-06-12 PROCEDURE — 1036F TOBACCO NON-USER: CPT | Performed by: INTERNAL MEDICINE

## 2019-06-12 PROCEDURE — 90471 IMMUNIZATION ADMIN: CPT | Performed by: INTERNAL MEDICINE

## 2019-06-12 PROCEDURE — 3017F COLORECTAL CA SCREEN DOC REV: CPT | Performed by: INTERNAL MEDICINE

## 2019-06-12 PROCEDURE — 90670 PCV13 VACCINE IM: CPT | Performed by: INTERNAL MEDICINE

## 2019-06-12 PROCEDURE — G8420 CALC BMI NORM PARAMETERS: HCPCS | Performed by: INTERNAL MEDICINE

## 2019-06-12 PROCEDURE — 99213 OFFICE O/P EST LOW 20 MIN: CPT | Performed by: INTERNAL MEDICINE

## 2019-06-12 PROCEDURE — G8427 DOCREV CUR MEDS BY ELIG CLIN: HCPCS | Performed by: INTERNAL MEDICINE

## 2019-06-12 PROCEDURE — 82270 OCCULT BLOOD FECES: CPT | Performed by: INTERNAL MEDICINE

## 2019-06-12 PROCEDURE — 93000 ELECTROCARDIOGRAM COMPLETE: CPT | Performed by: INTERNAL MEDICINE

## 2019-06-12 PROCEDURE — 99396 PREV VISIT EST AGE 40-64: CPT | Performed by: INTERNAL MEDICINE

## 2019-06-12 ASSESSMENT — PATIENT HEALTH QUESTIONNAIRE - PHQ9
SUM OF ALL RESPONSES TO PHQ9 QUESTIONS 1 & 2: 0
SUM OF ALL RESPONSES TO PHQ QUESTIONS 1-9: 0
2. FEELING DOWN, DEPRESSED OR HOPELESS: 0
SUM OF ALL RESPONSES TO PHQ QUESTIONS 1-9: 0
1. LITTLE INTEREST OR PLEASURE IN DOING THINGS: 0

## 2019-06-12 ASSESSMENT — ENCOUNTER SYMPTOMS
RESPIRATORY NEGATIVE: 1
ABDOMINAL PAIN: 0
ALLERGIC/IMMUNOLOGIC NEGATIVE: 1
NAUSEA: 0
COLOR CHANGE: 0
DIARRHEA: 0

## 2019-06-12 NOTE — PROGRESS NOTES
SUBJECTIVE:  Patient ID: Marcos Woo is an 60 y.o. male. HPI: Patient here today for the f/u of chronic problems-- see Problem List and associated comments. New issues or complaints include (alsosee Assessment for more details): Here for routine checkup. Overall he is doing very well. He is still on the transplant wait list.  Appetite and energy level are good. He gets routine checkups down at the transplant center. States he is avoiding alcohol. Labs are reviewed. Review of Systems   Constitutional: Positive for fatigue. Negative for unexpected weight change. Improved appetite   HENT: Negative. Eyes: Negative for visual disturbance. Respiratory: Negative. Cardiovascular: Negative. Gastrointestinal: Negative for abdominal pain, diarrhea and nausea. Genitourinary: Negative. Musculoskeletal: Positive for arthralgias. Skin: Negative for color change and rash. Allergic/Immunologic: Negative. Neurological: Negative. Negative for tremors. Hematological: Negative. Does not bruise/bleed easily. Psychiatric/Behavioral: Negative. OBJECTIVE:    /80 (Site: Left Upper Arm, Position: Sitting, Cuff Size: Medium Adult)   Pulse 89   Ht 5' 10\" (1.778 m)   Wt 174 lb (78.9 kg)   SpO2 93%   BMI 24.97 kg/m²      Physical Exam   Constitutional: He is oriented to person, place, and time. He appears well-developed and well-nourished. No distress. HENT:   Head: Normocephalic and atraumatic. Right Ear: External ear normal.   Left Ear: External ear normal.   Mouth/Throat: Oropharynx is clear and moist. No oropharyngeal exudate. Eyes: Pupils are equal, round, and reactive to light. EOM are normal. Right eye exhibits no discharge. Left eye exhibits no discharge. No scleral icterus. Neck: Normal range of motion. Neck supple. No JVD present. Carotid bruit is not present. No tracheal deviation present. No thyromegaly present.    Cardiovascular: Normal rate, regular rhythm, normal heart sounds and intact distal pulses. Exam reveals no gallop. No murmur heard. Pulses:       Carotid pulses are 2+ on the right side, and 2+ on the left side. Radial pulses are 2+ on the right side, and 2+ on the left side. Posterior tibial pulses are 2+ on the right side, and 2+ on the left side. Pulmonary/Chest: Effort normal and breath sounds normal. No stridor. No respiratory distress. He has no wheezes. He has no rales. Abdominal: Soft. Bowel sounds are normal. He exhibits no distension, no pulsatile liver, no fluid wave, no abdominal bruit, no ascites and no mass. There is no hepatosplenomegaly. There is no tenderness. Hernia confirmed negative in the right inguinal area and confirmed negative in the left inguinal area. Genitourinary: Rectum normal, prostate normal, testes normal and penis normal. Rectal exam shows guaiac negative stool. Right testis shows no mass. Left testis shows no mass. Circumcised. Musculoskeletal: He exhibits no edema. Right knee - peripatellar scars; no crepitus; stable   Lymphadenopathy:        Head (right side): No submandibular adenopathy present. Head (left side): No submandibular adenopathy present. He has no cervical adenopathy. No inguinal adenopathy noted on the right or left side. Right: No inguinal and no supraclavicular adenopathy present. Left: No inguinal and no supraclavicular adenopathy present. Neurological: He is alert and oriented to person, place, and time. He has normal strength and normal reflexes. He displays no tremor. No cranial nerve deficit. He exhibits normal muscle tone. Gait normal.   Reflex Scores:       Bicep reflexes are 2+ on the right side and 2+ on the left side. Patellar reflexes are 2+ on the right side and 2+ on the left side. Skin: He is not diaphoretic. No pallor. Psychiatric: He has a normal mood and affect.  His speech is normal and behavior is normal. Judgment and thought content normal. Cognition and memory are normal.       ASSESSMENT:       Encounter Diagnoses   Name Primary?  Essential hypertension Yes    Severe malnutrition (HCC)     Need for pneumococcal vaccination     Preventative health care     Liver disease, chronic, due to alcohol ([facility] [address]     Hyperlipidemia, unspecified hyperlipidemia type     History of alcohol abuse        Preventative health care  Labs reviewed. Still on transplant wait list.    Liver disease, chronic, due to alcohol ([facility] [address]  HEIDE score decreased to 11. Patient still on transplant wait list.  Continue spironolactone and Xifaxan. No significant ascites over the last 8 months. Hypertension  BP okay    Hyperlipidemia  Lipids okay-no Rx    History of alcohol abuse  Denies any use        PLAN:See ASSESSMENT for evaluation & PLAN     Orders Placed This Encounter   Procedures    Pneumococcal conjugate vaccine 13-valent    EKG 12 Lead     Order Specific Question:   Reason for Exam?     Answer: Other       PSH, PMH, SH and FH reviewed and noted. Recent and past labs, tests and consultsalso reviewed. Recent or new meds also reviewed.

## 2019-07-23 ENCOUNTER — TELEPHONE (OUTPATIENT)
Dept: INTERNAL MEDICINE CLINIC | Age: 59
End: 2019-07-23

## 2019-08-19 RX ORDER — PANTOPRAZOLE SODIUM 40 MG/1
TABLET, DELAYED RELEASE ORAL
Qty: 30 TABLET | Refills: 1 | Status: SHIPPED | OUTPATIENT
Start: 2019-08-19 | End: 2019-10-21 | Stop reason: SDUPTHER

## 2019-09-20 ENCOUNTER — OFFICE VISIT (OUTPATIENT)
Dept: INTERNAL MEDICINE CLINIC | Age: 59
End: 2019-09-20
Payer: COMMERCIAL

## 2019-09-20 VITALS
WEIGHT: 184 LBS | HEIGHT: 70 IN | BODY MASS INDEX: 26.34 KG/M2 | SYSTOLIC BLOOD PRESSURE: 130 MMHG | OXYGEN SATURATION: 94 % | DIASTOLIC BLOOD PRESSURE: 80 MMHG | HEART RATE: 70 BPM

## 2019-09-20 DIAGNOSIS — K70.9 LIVER DISEASE, CHRONIC, DUE TO ALCOHOL (HCC): ICD-10-CM

## 2019-09-20 DIAGNOSIS — E43 SEVERE MALNUTRITION (HCC): Chronic | ICD-10-CM

## 2019-09-20 DIAGNOSIS — Z23 NEED FOR VACCINATION WITH TWINRIX: Primary | ICD-10-CM

## 2019-09-20 DIAGNOSIS — I10 ESSENTIAL HYPERTENSION: ICD-10-CM

## 2019-09-20 PROCEDURE — 90636 HEP A/HEP B VACC ADULT IM: CPT | Performed by: INTERNAL MEDICINE

## 2019-09-20 PROCEDURE — 90471 IMMUNIZATION ADMIN: CPT | Performed by: INTERNAL MEDICINE

## 2019-09-20 PROCEDURE — G8419 CALC BMI OUT NRM PARAM NOF/U: HCPCS | Performed by: INTERNAL MEDICINE

## 2019-09-20 PROCEDURE — 99214 OFFICE O/P EST MOD 30 MIN: CPT | Performed by: INTERNAL MEDICINE

## 2019-09-20 PROCEDURE — 3017F COLORECTAL CA SCREEN DOC REV: CPT | Performed by: INTERNAL MEDICINE

## 2019-09-20 PROCEDURE — 1036F TOBACCO NON-USER: CPT | Performed by: INTERNAL MEDICINE

## 2019-09-20 PROCEDURE — G8427 DOCREV CUR MEDS BY ELIG CLIN: HCPCS | Performed by: INTERNAL MEDICINE

## 2019-09-20 ASSESSMENT — ENCOUNTER SYMPTOMS
ABDOMINAL DISTENTION: 1
SHORTNESS OF BREATH: 0
ABDOMINAL PAIN: 0

## 2019-09-20 ASSESSMENT — PATIENT HEALTH QUESTIONNAIRE - PHQ9
SUM OF ALL RESPONSES TO PHQ QUESTIONS 1-9: 0
2. FEELING DOWN, DEPRESSED OR HOPELESS: 0
1. LITTLE INTEREST OR PLEASURE IN DOING THINGS: 0
SUM OF ALL RESPONSES TO PHQ QUESTIONS 1-9: 0
SUM OF ALL RESPONSES TO PHQ9 QUESTIONS 1 & 2: 0

## 2019-10-22 RX ORDER — PANTOPRAZOLE SODIUM 40 MG/1
TABLET, DELAYED RELEASE ORAL
Qty: 30 TABLET | Refills: 3 | Status: SHIPPED | OUTPATIENT
Start: 2019-10-22 | End: 2020-02-25

## 2020-02-25 RX ORDER — PANTOPRAZOLE SODIUM 40 MG/1
TABLET, DELAYED RELEASE ORAL
Qty: 30 TABLET | Refills: 3 | Status: SHIPPED | OUTPATIENT
Start: 2020-02-25 | End: 2020-05-04 | Stop reason: SDUPTHER

## 2020-04-29 RX ORDER — CARVEDILOL 6.25 MG/1
TABLET ORAL
Qty: 60 TABLET | Refills: 1 | OUTPATIENT
Start: 2020-04-29

## 2020-05-04 RX ORDER — PANTOPRAZOLE SODIUM 40 MG/1
TABLET, DELAYED RELEASE ORAL
Qty: 30 TABLET | Refills: 3 | Status: SHIPPED | OUTPATIENT
Start: 2020-05-04 | End: 2020-10-06

## 2020-05-04 RX ORDER — NIFEDIPINE 60 MG/1
60 TABLET, EXTENDED RELEASE ORAL
COMMUNITY
Start: 2020-02-11 | End: 2020-05-04 | Stop reason: SDUPTHER

## 2020-05-04 RX ORDER — CARVEDILOL 6.25 MG/1
TABLET ORAL
Qty: 60 TABLET | Refills: 3 | Status: SHIPPED | OUTPATIENT
Start: 2020-05-04 | End: 2020-08-21

## 2020-05-04 RX ORDER — NIFEDIPINE 60 MG/1
TABLET, EXTENDED RELEASE ORAL
Qty: 30 TABLET | Refills: 3 | Status: SHIPPED | OUTPATIENT
Start: 2020-05-04 | End: 2020-06-16 | Stop reason: ALTCHOICE

## 2020-06-11 DIAGNOSIS — K76.7 HEPATORENAL SYNDROME (HCC): ICD-10-CM

## 2020-06-11 LAB
CREATININE URINE: 378.7 MG/DL (ref 39–259)
MICROALBUMIN UR-MCNC: 3.2 MG/DL
MICROALBUMIN/CREAT UR-RTO: 8.4 MG/G (ref 0–30)

## 2020-07-31 NOTE — H&P
History and Physical / Pre-Sedation Assessment      Intended Procedure:  EGD    Indication: abdominal pain, varices. Nurses notes reviewed and agreed. Medications reviewed  Allergies: No Known Allergies    Physical Exam:  Vital Signs: BP (!) 91/54   Pulse 93   Temp 97.7 °F (36.5 °C)   Resp 18   Ht 6' 1\" (1.854 m)   Wt 140 lb (63.5 kg)   SpO2 99%   BMI 18.47 kg/m²    Airway: Mallampati: II (soft palate, uvula, fauces visible)  Pulmonary:Normal  Cardiac:Normal  Abdomen:Normal    Pre-Procedure Assessment / Plan:  ASA: Class 4 - A patient with an incapacitating systemic disease that is a constant threat to life  Level of Sedation Plan:Deep sedation  Post Procedure plan: Return to same level of care    I assessed the patient and find that the patient is in satisfactory condition to proceed with the planned procedure and sedation plan. I have explained the risk, benefits, and alternatives to the procedure; the patient understands and agrees to proceed.        Elliot Benoit  7/18/2018
no

## 2020-08-21 RX ORDER — CARVEDILOL 6.25 MG/1
TABLET ORAL
Qty: 60 TABLET | Refills: 3 | Status: SHIPPED | OUTPATIENT
Start: 2020-08-21 | End: 2020-10-20 | Stop reason: SDUPTHER

## 2020-10-06 ENCOUNTER — VIRTUAL VISIT (OUTPATIENT)
Dept: ENDOCRINOLOGY | Age: 60
End: 2020-10-06
Payer: COMMERCIAL

## 2020-10-06 PROCEDURE — 3017F COLORECTAL CA SCREEN DOC REV: CPT | Performed by: INTERNAL MEDICINE

## 2020-10-06 PROCEDURE — 99213 OFFICE O/P EST LOW 20 MIN: CPT | Performed by: INTERNAL MEDICINE

## 2020-10-06 PROCEDURE — G8427 DOCREV CUR MEDS BY ELIG CLIN: HCPCS | Performed by: INTERNAL MEDICINE

## 2020-10-06 RX ORDER — SODIUM CHLORIDE 0.9 % (FLUSH) 0.9 %
5 SYRINGE (ML) INJECTION PRN
Status: CANCELLED | OUTPATIENT
Start: 2020-10-06

## 2020-10-06 RX ORDER — SODIUM CHLORIDE 9 MG/ML
INJECTION, SOLUTION INTRAVENOUS CONTINUOUS
Status: CANCELLED | OUTPATIENT
Start: 2020-10-06

## 2020-10-06 RX ORDER — PANTOPRAZOLE SODIUM 40 MG/1
TABLET, DELAYED RELEASE ORAL
Qty: 30 TABLET | Refills: 0 | Status: SHIPPED | OUTPATIENT
Start: 2020-10-06 | End: 2020-11-12

## 2020-10-06 RX ORDER — 0.9 % SODIUM CHLORIDE 0.9 %
250 INTRAVENOUS SOLUTION INTRAVENOUS ONCE
Status: CANCELLED | OUTPATIENT
Start: 2020-10-06

## 2020-10-06 RX ORDER — ZOLEDRONIC ACID 5 MG/100ML
5 INJECTION, SOLUTION INTRAVENOUS ONCE
Status: CANCELLED | OUTPATIENT
Start: 2020-10-06

## 2020-10-06 RX ORDER — DIPHENHYDRAMINE HYDROCHLORIDE 50 MG/ML
50 INJECTION INTRAMUSCULAR; INTRAVENOUS ONCE
Status: CANCELLED | OUTPATIENT
Start: 2020-10-06

## 2020-10-06 RX ORDER — METHYLPREDNISOLONE SODIUM SUCCINATE 125 MG/2ML
125 INJECTION, POWDER, LYOPHILIZED, FOR SOLUTION INTRAMUSCULAR; INTRAVENOUS ONCE
Status: CANCELLED | OUTPATIENT
Start: 2020-10-06

## 2020-10-06 RX ORDER — EPINEPHRINE 1 MG/ML
0.3 INJECTION, SOLUTION, CONCENTRATE INTRAVENOUS PRN
Status: CANCELLED | OUTPATIENT
Start: 2020-10-06

## 2020-10-06 RX ORDER — SODIUM CHLORIDE 0.9 % (FLUSH) 0.9 %
10 SYRINGE (ML) INJECTION PRN
Status: CANCELLED | OUTPATIENT
Start: 2020-10-06

## 2020-10-06 RX ORDER — HEPARIN SODIUM (PORCINE) LOCK FLUSH IV SOLN 100 UNIT/ML 100 UNIT/ML
500 SOLUTION INTRAVENOUS PRN
Status: CANCELLED | OUTPATIENT
Start: 2020-10-06

## 2020-10-06 NOTE — PROGRESS NOTES
Subjective: Dominic Heath is a  who is here for f/u evaluation of osteoporosis. Pursuant to the emergency declaration under the [address] [place] [address], [facility] waiver authority and the [employer] Act, this Virtual  Visit was conducted, with patient's consent, to reduce the patient's risk of exposure to COVID-19 and provide continuity of care for an established patient. Patient was at home. Provider was at home. No one else was involved  Services were provided through a video synchronous discussion virtually to substitute for in-person clinic visit. Patient had issue and was not able to see my video    Patient is being seen at the request of  Brice Romero MD     Interim: Seen after 1/19  Last Reclast 4/19 11/19 he had transplant of liver    He was diagnosed with Osteoporsis    DEXA scan 11/18    FINDINGS:  Spine (L1-L4):  BMD:  0.766 g/cm2   T-score: -3.0 SD   Z-score: -3.3 SD     Left femoral neck:   BMD: 0.676 g/cm2  T-score: -1.9  SD  Z-score: -1.4 SD    Left total hip:   BMD: 0.800 g/cm2   T-score: -1.5 SD   Z-score: -1.4 SD     Right femoral neck:   BMD: 0.641 g/cm2  T-score: -2.1  SD  Z-score: -1.6 SD    Right total hip:   BMD: 0.769 g/cm2   T-score: -1.7 SD   Z-score: -1.6 SD     Moderate, uncontrolled    History of fractures :  None   Pharmacological therapy for Osteoprosis:  None     FH of Osteoporosis:Mom   FH of hip fracture:none    Secondary causes of osteoprorsis: Liver Cirrhosis , Alcohol use, smoking    Calcium:  Diet: 600mg  Oyster shell Ca / Vit D 500mg BID  Vitamin D: 93 on 2/17  None  Exercise: none    Follows up regularly with a dentist. No major dental procedures planned.     He was found to have high Ca on 12/18  Ca 10.5 previous level normal    H/o esophageal banding    2/19 Ca 24 hour 130 PTH 6  Vit D 42  TSH 1.91  Ca 10.2  Testosterone 180    Past Medical History:   Diagnosis Date    Cirrhosis ([address]  Hyperlipidemia     Hypertension      Past Surgical History:   Procedure Laterality Date    KNEE SURGERY Right 1969    fall; glass injury - laceration    [place] ESOPHAGOGASTRODUODENOSCOPY TRANSORAL DIAGNOSTIC N/A 7/18/2018    EGD ESOPHAGOGASTRODUODENOSCOPY WITH ESOPHAGEAL VARICES BANDING X 2 WITH MAC ANESTHESIA performed by Anna Amaral MD at [facility]. General[facility]simo 6 N/A 8/16/2018    EGD BAND LIGATION OF ESOPHAGEAL VARICES X3 WITH MAC performed by Anna Amaral MD at [facility] ENDOSCOPY     Current Outpatient Medications   Medication Sig Dispense Refill    carvedilol (COREG) 6.25 MG tablet Take 1 tablet (6.25 mg total) by mouth 2 times a day. 60 tablet 3    acetaminophen (TYLENOL) 500 MG tablet Take 325 mg by mouth every 6 hours as needed      pantoprazole (PROTONIX) 40 MG tablet Take 1 tablet (40 mg total) by mouth every morning before breakfast. 30 tablet 3    tacrolimus (PROGRAF) 1 MG capsule Take 1 mg by mouth 2 times daily      mycophenolate (CELLCEPT) 500 MG tablet Take 500 mg by mouth 2 times daily      Multiple Vitamins-Minerals (THERAPEUTIC MULTIVITAMIN-MINERALS) tablet Take 1 tablet by mouth daily       No current facility-administered medications for this visit. Review of Systems  Constitutional: Negative for weight loss and malaise/fatigue. Negative for fever and chills. HENT: Negative for hearing loss, ear pain, nosebleeds, neck pain and tinnitus. Eyes: Negative for blurred vision. Negative for double vision, photophobia and pain. Respiratory: Negative for cough and sputum production. Cardiovascular: Negative for chest pain, palpitations and leg swelling. Gastrointestinal: Negative for nausea, vomiting and abdominal pain. Genitourinary: Negative for dysuria, urgency and frequency. +incontinence  Musculoskeletal: Negative for back pain. No joint pain  Skin: Negative for itching and rash.    Neurological: Negative for dizziness. Negative for tingling, tremors, focal weakness and headaches. Endo/Heme/Allergies: see HPI  Psychiatric/Behavioral: Negative for depression and substance abuse. Lab Review  No results found for: TSH  No results found for: FREET4      Assessment:     1. Osteoporosis: Low BMD at spine, Z score is also low. BMD in osteopenia range in hips. Discussed risk of fractures. He has risk factors for osteoporosis. Discussed management, needs pharmacological therapy. Avoid Bisphosphonate given h/o varices. Ordered Reclast. He had 4/19, is past due for infusion. Will reorder. Needs Dexa scan as due. May be low as had only one infusion of reclast  Testosterone low normal.  2.Hypercalcemia : Recheck Ca normal. PTH low. Will order again. Check PTHrP  3. Liver cirrhosis : S/p transplant     Plan:     Discussed management of osteoporosis in detail. Discussed treatment options. Told him that given high risk of fracture and osteoporosis, I would recommend Bisphosphonate therapy for her. Started Reclast. He is due   Discussed the side effects including Osteonecrosis of the jaw and Atypical fractures. Patient understands that the risk is low. He is willing to take the medication. In general advised to avoid falls, use night lights at night.

## 2020-10-15 DIAGNOSIS — M81.0 AGE-RELATED OSTEOPOROSIS WITHOUT CURRENT PATHOLOGICAL FRACTURE: ICD-10-CM

## 2020-10-15 LAB
ANION GAP SERPL CALCULATED.3IONS-SCNC: 13 MMOL/L (ref 3–16)
BUN BLDV-MCNC: 18 MG/DL (ref 7–20)
CALCIUM SERPL-MCNC: 9.3 MG/DL (ref 8.3–10.6)
CHLORIDE BLD-SCNC: 104 MMOL/L (ref 99–110)
CO2: 23 MMOL/L (ref 21–32)
CREAT SERPL-MCNC: 1.2 MG/DL (ref 0.8–1.3)
GFR AFRICAN AMERICAN: >60
GFR NON-AFRICAN AMERICAN: >60
GLUCOSE BLD-MCNC: 87 MG/DL (ref 70–99)
POTASSIUM SERPL-SCNC: 4.8 MMOL/L (ref 3.5–5.1)
SODIUM BLD-SCNC: 140 MMOL/L (ref 136–145)
VITAMIN D 25-HYDROXY: 50.2 NG/ML

## 2020-10-16 LAB — PARATHYROID HORMONE INTACT: 31.1 PG/ML (ref 14–72)

## 2020-10-20 RX ORDER — CARVEDILOL 12.5 MG/1
12.5 TABLET ORAL 2 TIMES DAILY WITH MEALS
Qty: 60 TABLET | Refills: 5 | Status: SHIPPED | OUTPATIENT
Start: 2020-10-20 | End: 2021-04-27

## 2020-10-21 ENCOUNTER — HOSPITAL ENCOUNTER (OUTPATIENT)
Dept: GENERAL RADIOLOGY | Age: 60
Discharge: HOME | End: 2020-10-21
Payer: COMMERCIAL

## 2020-10-21 PROCEDURE — 77080 DXA BONE DENSITY AXIAL: CPT

## 2020-10-21 NOTE — PROGRESS NOTES
A single low resolution AP image of the lumbar spine was performed and shows the total bone mineral density to measure 0.777 with a T-score value of -2.9 and a Z-score of -2.2 which is 76% of mean based on age and sex.     A single low resolution AP image of the left hip was performed and shows the total bone mineral density to measure 0.696 with a T-score value of -2.2 and a Z-score at -1.8 which is 72% of mean based on age and sex. T-score value at the femoral neck is   -2. 2.     FRAX not reported because some T scores are at or below-2.5 and/or patient is being treated for osteoporosis.     A single low resolution digital AP image of the right hip was performed and shows the total bone mineral density to measure 0.656 with a T score value of -2.5 and a Z score at -2.0 which is 60% of mean based on age and sex. T score value at the femoral   neck is -2.9     FRAX not reported because some T scores are at or below-2.5 and/or patient is being treated for osteoporosis.

## 2020-10-22 RX ORDER — ZOLEDRONIC ACID 5 MG/100ML
5 INJECTION, SOLUTION INTRAVENOUS ONCE
Qty: 100 ML | Refills: 0 | Status: SHIPPED | OUTPATIENT
Start: 2020-10-22 | End: 2020-12-14 | Stop reason: ALTCHOICE

## 2020-11-02 ENCOUNTER — HOSPITAL ENCOUNTER (OUTPATIENT)
Dept: ONCOLOGY | Age: 60
Setting detail: INFUSION SERIES
Discharge: HOME | End: 2020-11-02
Payer: COMMERCIAL

## 2020-11-02 VITALS
RESPIRATION RATE: 18 BRPM | HEART RATE: 80 BPM | SYSTOLIC BLOOD PRESSURE: 175 MMHG | TEMPERATURE: 97.7 F | DIASTOLIC BLOOD PRESSURE: 74 MMHG

## 2020-11-02 DIAGNOSIS — M81.0 AGE-RELATED OSTEOPOROSIS WITHOUT CURRENT PATHOLOGICAL FRACTURE: Primary | ICD-10-CM

## 2020-11-02 PROCEDURE — 96365 THER/PROPH/DIAG IV INF INIT: CPT

## 2020-11-02 PROCEDURE — 96360 HYDRATION IV INFUSION INIT: CPT

## 2020-11-02 PROCEDURE — 2580000003 HC RX 258: Performed by: INTERNAL MEDICINE

## 2020-11-02 PROCEDURE — 6360000002 HC RX W HCPCS: Performed by: INTERNAL MEDICINE

## 2020-11-02 RX ORDER — SODIUM CHLORIDE 9 MG/ML
INJECTION, SOLUTION INTRAVENOUS CONTINUOUS
Status: CANCELLED | OUTPATIENT
Start: 2020-11-02

## 2020-11-02 RX ORDER — HEPARIN SODIUM (PORCINE) LOCK FLUSH IV SOLN 100 UNIT/ML 100 UNIT/ML
500 SOLUTION INTRAVENOUS PRN
Status: CANCELLED | OUTPATIENT
Start: 2020-11-02

## 2020-11-02 RX ORDER — DIPHENHYDRAMINE HYDROCHLORIDE 50 MG/ML
50 INJECTION INTRAMUSCULAR; INTRAVENOUS ONCE
Status: CANCELLED | OUTPATIENT
Start: 2020-11-02

## 2020-11-02 RX ORDER — SODIUM CHLORIDE 0.9 % (FLUSH) 0.9 %
10 SYRINGE (ML) INJECTION PRN
Status: CANCELLED | OUTPATIENT
Start: 2020-11-02

## 2020-11-02 RX ORDER — 0.9 % SODIUM CHLORIDE 0.9 %
250 INTRAVENOUS SOLUTION INTRAVENOUS ONCE
Status: COMPLETED | OUTPATIENT
Start: 2020-11-02 | End: 2020-11-02

## 2020-11-02 RX ORDER — SODIUM CHLORIDE 0.9 % (FLUSH) 0.9 %
5 SYRINGE (ML) INJECTION PRN
Status: CANCELLED | OUTPATIENT
Start: 2020-11-02

## 2020-11-02 RX ORDER — 0.9 % SODIUM CHLORIDE 0.9 %
250 INTRAVENOUS SOLUTION INTRAVENOUS ONCE
Status: CANCELLED | OUTPATIENT
Start: 2020-11-02

## 2020-11-02 RX ORDER — METHYLPREDNISOLONE SODIUM SUCCINATE 125 MG/2ML
125 INJECTION, POWDER, LYOPHILIZED, FOR SOLUTION INTRAMUSCULAR; INTRAVENOUS ONCE
Status: CANCELLED | OUTPATIENT
Start: 2020-11-02

## 2020-11-02 RX ORDER — ZOLEDRONIC ACID 5 MG/100ML
5 INJECTION, SOLUTION INTRAVENOUS ONCE
Status: CANCELLED | OUTPATIENT
Start: 2020-11-02

## 2020-11-02 RX ORDER — ZOLEDRONIC ACID 5 MG/100ML
5 INJECTION, SOLUTION INTRAVENOUS ONCE
Status: COMPLETED | OUTPATIENT
Start: 2020-11-02 | End: 2020-11-02

## 2020-11-02 RX ADMIN — ZOLEDRONIC ACID 5 MG: 0.05 INJECTION, SOLUTION INTRAVENOUS at 12:58

## 2020-11-02 RX ADMIN — SODIUM CHLORIDE 250 ML: 9 INJECTION, SOLUTION INTRAVENOUS at 12:32

## 2020-11-02 NOTE — PLAN OF CARE
Problem: SAFETY  Goal: Free from accidental physical injury  Outcome: Ongoing  Note: Pt is a Low fall risk. Explained fall risk precautions to pt and rationale behind their use to keep the patient safe. Belongings are in reach. Pt encouraged to notify staff for any and all assistance. Staff present in tx suite throughout entirety of pts treatment to monitor and protect from falls. Assistance provided when ambulating to restroom utilizing Stay With Me. Problem: KNOWLEDGE DEFICIT  Goal: Patient/S.O. demonstrates understanding of disease process, treatment plan, medications, and discharge instructions. Outcome: Ongoing  Note: Pt seen and assessed at [address] [place] [address] for Reclast, 5mg infusion followed by Sodium Chloride, 250ml bolus post Reclast infusion per orders from Dr. Aliyah Winston. Infused per [facility] policy. Monitoring completed for infusion reactions - see flowsheet. Pt tolerated infusion well and without incident. Pt verbalizes understanding of discharge instructions. Discharged ambulatory per self.

## 2020-11-12 RX ORDER — PANTOPRAZOLE SODIUM 40 MG/1
TABLET, DELAYED RELEASE ORAL
Qty: 30 TABLET | Refills: 0 | Status: SHIPPED | OUTPATIENT
Start: 2020-11-12 | End: 2020-12-18

## 2020-12-10 RX ORDER — PANTOPRAZOLE SODIUM 40 MG/1
TABLET, DELAYED RELEASE ORAL
Qty: 30 TABLET | Refills: 0 | OUTPATIENT
Start: 2020-12-10

## 2020-12-14 ENCOUNTER — OFFICE VISIT (OUTPATIENT)
Dept: INTERNAL MEDICINE CLINIC | Age: 60
End: 2020-12-14
Payer: COMMERCIAL

## 2020-12-14 ENCOUNTER — PATIENT MESSAGE (OUTPATIENT)
Dept: INTERNAL MEDICINE CLINIC | Age: 60
End: 2020-12-14

## 2020-12-14 ENCOUNTER — TELEPHONE (OUTPATIENT)
Dept: INTERNAL MEDICINE CLINIC | Age: 60
End: 2020-12-14

## 2020-12-14 VITALS
WEIGHT: 198 LBS | HEIGHT: 73 IN | DIASTOLIC BLOOD PRESSURE: 76 MMHG | SYSTOLIC BLOOD PRESSURE: 138 MMHG | BODY MASS INDEX: 26.24 KG/M2 | TEMPERATURE: 96.9 F

## 2020-12-14 PROBLEM — K70.31 ALCOHOLIC CIRRHOSIS OF LIVER WITH ASCITES (HCC): Status: RESOLVED | Noted: 2019-02-19 | Resolved: 2020-12-14

## 2020-12-14 PROBLEM — E43 SEVERE MALNUTRITION (HCC): Chronic | Status: RESOLVED | Noted: 2018-08-20 | Resolved: 2020-12-14

## 2020-12-14 PROCEDURE — G8419 CALC BMI OUT NRM PARAM NOF/U: HCPCS | Performed by: INTERNAL MEDICINE

## 2020-12-14 PROCEDURE — G8427 DOCREV CUR MEDS BY ELIG CLIN: HCPCS | Performed by: INTERNAL MEDICINE

## 2020-12-14 PROCEDURE — 99213 OFFICE O/P EST LOW 20 MIN: CPT | Performed by: INTERNAL MEDICINE

## 2020-12-14 PROCEDURE — 1036F TOBACCO NON-USER: CPT | Performed by: INTERNAL MEDICINE

## 2020-12-14 PROCEDURE — G8484 FLU IMMUNIZE NO ADMIN: HCPCS | Performed by: INTERNAL MEDICINE

## 2020-12-14 PROCEDURE — 90732 PPSV23 VACC 2 YRS+ SUBQ/IM: CPT | Performed by: INTERNAL MEDICINE

## 2020-12-14 PROCEDURE — 3017F COLORECTAL CA SCREEN DOC REV: CPT | Performed by: INTERNAL MEDICINE

## 2020-12-14 PROCEDURE — 99396 PREV VISIT EST AGE 40-64: CPT | Performed by: INTERNAL MEDICINE

## 2020-12-14 PROCEDURE — 93000 ELECTROCARDIOGRAM COMPLETE: CPT | Performed by: INTERNAL MEDICINE

## 2020-12-14 SDOH — ECONOMIC STABILITY: TRANSPORTATION INSECURITY
IN THE PAST 12 MONTHS, HAS THE LACK OF TRANSPORTATION KEPT YOU FROM MEDICAL APPOINTMENTS OR FROM GETTING MEDICATIONS?: NO

## 2020-12-14 SDOH — ECONOMIC STABILITY: TRANSPORTATION INSECURITY
IN THE PAST 12 MONTHS, HAS LACK OF TRANSPORTATION KEPT YOU FROM MEETINGS, WORK, OR FROM GETTING THINGS NEEDED FOR DAILY LIVING?: NO

## 2020-12-14 SDOH — ECONOMIC STABILITY: FOOD INSECURITY: WITHIN THE PAST 12 MONTHS, THE FOOD YOU BOUGHT JUST DIDN'T LAST AND YOU DIDN'T HAVE MONEY TO GET MORE.: NEVER TRUE

## 2020-12-14 SDOH — ECONOMIC STABILITY: INCOME INSECURITY: HOW HARD IS IT FOR YOU TO PAY FOR THE VERY BASICS LIKE FOOD, HOUSING, MEDICAL CARE, AND HEATING?: NOT HARD AT ALL

## 2020-12-14 SDOH — ECONOMIC STABILITY: FOOD INSECURITY: WITHIN THE PAST 12 MONTHS, YOU WORRIED THAT YOUR FOOD WOULD RUN OUT BEFORE YOU GOT MONEY TO BUY MORE.: NEVER TRUE

## 2020-12-14 ASSESSMENT — ENCOUNTER SYMPTOMS
NAUSEA: 0
TROUBLE SWALLOWING: 0
GASTROINTESTINAL NEGATIVE: 1
RESPIRATORY NEGATIVE: 1

## 2020-12-14 ASSESSMENT — PATIENT HEALTH QUESTIONNAIRE - PHQ9
SUM OF ALL RESPONSES TO PHQ QUESTIONS 1-9: 0
SUM OF ALL RESPONSES TO PHQ QUESTIONS 1-9: 0
1. LITTLE INTEREST OR PLEASURE IN DOING THINGS: 0
SUM OF ALL RESPONSES TO PHQ9 QUESTIONS 1 & 2: 0
2. FEELING DOWN, DEPRESSED OR HOPELESS: 0
SUM OF ALL RESPONSES TO PHQ QUESTIONS 1-9: 0

## 2020-12-14 NOTE — PROGRESS NOTES
JVD.      Trachea: No tracheal deviation. Cardiovascular:      Rate and Rhythm: Normal rate and regular rhythm. Pulses:           Carotid pulses are 2+ on the right side and 2+ on the left side. Radial pulses are 2+ on the right side and 2+ on the left side. Posterior tibial pulses are 2+ on the right side and 2+ on the left side. Heart sounds: Normal heart sounds. No murmur. No gallop. Pulmonary:      Effort: Pulmonary effort is normal. No respiratory distress. Breath sounds: Normal breath sounds. No stridor. No wheezing or rales. Abdominal:      General: Bowel sounds are normal. There is no distension or abdominal bruit. Palpations: Abdomen is soft. There is no fluid wave or mass. Tenderness: There is no abdominal tenderness. Hernia: There is no hernia in the left inguinal area. Comments: Abdominal scar well-healed   Genitourinary:     Penis: Normal and circumcised. Testes: Normal.         Right: Mass not present. Left: Mass not present. Musculoskeletal:      Right lower leg: No edema. Left lower leg: No edema. Comments: Right knee - peripatellar scars; no crepitus; stable   Lymphadenopathy:      Head:      Right side of head: No submandibular adenopathy. Left side of head: No submandibular adenopathy. Cervical: No cervical adenopathy. Upper Body:      Right upper body: No supraclavicular adenopathy. Left upper body: No supraclavicular adenopathy. Lower Body: No right inguinal adenopathy. No left inguinal adenopathy. Skin:     Coloration: Skin is not jaundiced or pale. Neurological:      General: No focal deficit present. Mental Status: He is alert and oriented to person, place, and time. Cranial Nerves: Cranial nerves are intact. No cranial nerve deficit. Motor: Motor function is intact. No tremor or abnormal muscle tone. Coordination: Coordination is intact.       Gait: Gait normal. Deep Tendon Reflexes: Reflexes are normal and symmetric. Reflex Scores:       Bicep reflexes are 2+ on the right side and 2+ on the left side. Patellar reflexes are 2+ on the right side and 2+ on the left side. Psychiatric:         Attention and Perception: Attention normal.         Mood and Affect: Mood normal.         Speech: Speech normal.         Behavior: Behavior normal.         Thought Content: Thought content normal.         Cognition and Memory: Cognition normal.         Judgment: Judgment normal.         ASSESSMENT:       Encounter Diagnoses   Name Primary?  Preventative health care Yes    Essential hypertension     Special screening for malignant neoplasm of prostate     Hyperlipidemia, mixed     Hyperglycemia     Liver disease, chronic, due to [facility] ([facility]     History of alcohol abuse     Hyperlipidemia, unspecified hyperlipidemia type     Former cigarette smoker     Elevated MCV        Preventative health care  Doing well. No infectious symptoms during the pandemic. Tolerating medications. Pneumovax 23 today. Checking further labs in 1 month. Liver disease, chronic, due to alcohol ([facility]  Now 13 months out from transplantdoing very well. History of alcohol abuse  Denies any alcohol abuse/use.     Hyperlipidemia  No Rxcheck lipids with next blood draw    Hypertension  BP controlledcontinue carvedilol    Former cigarette smoker  Denies smoking        PLAN:See ASSESSMENT for evaluation & PLAN     Orders Placed This Encounter   Procedures    TSH WITH REFLEX TO FT4     Standing Status:   Future     Standing Expiration Date:   12/14/2021    Psa screening     Standing Status:   Future     Standing Expiration Date:   12/14/2021    Hemoglobin A1C     Standing Status:   Future     Standing Expiration Date:   12/14/2021    Lipid Panel     Standing Status:   Future     Standing Expiration Date:   12/14/2021     Order Specific Question:   Is Patient Fasting?/# of Hours Answer:   yes - 8 hours    Vitamin B12     Standing Status:   Future     Standing Expiration Date:   12/14/2021    EKG 12 Lead     Order Specific Question:   Reason for Exam?     Answer: Other     , PMH, SH and FH reviewed and noted. Recent and past labs, tests and consultsalso reviewed. Recent or new meds also reviewed.

## 2020-12-14 NOTE — TELEPHONE ENCOUNTER
Prescription Question    From   Tiffanie Nuñez To   [place] [address] Practice Support Sent   12/14/2020  3:50 PM   Doctor Yaneli Puente,     Since I'm no longer experiencing (GERD), can I stop taking Protonix?      Wishing you and yours a wonderful Holidays Season,   Suresh Ordaz

## 2020-12-18 RX ORDER — PANTOPRAZOLE SODIUM 40 MG/1
TABLET, DELAYED RELEASE ORAL
Qty: 30 TABLET | Refills: 3 | Status: SHIPPED | OUTPATIENT
Start: 2020-12-18 | End: 2021-04-20

## 2021-01-04 DIAGNOSIS — I10 ESSENTIAL HYPERTENSION: ICD-10-CM

## 2021-01-04 DIAGNOSIS — R71.8 ELEVATED MCV: ICD-10-CM

## 2021-01-04 DIAGNOSIS — R73.9 HYPERGLYCEMIA: ICD-10-CM

## 2021-01-04 DIAGNOSIS — Z00.00 PREVENTATIVE HEALTH CARE: ICD-10-CM

## 2021-01-04 DIAGNOSIS — E78.2 HYPERLIPIDEMIA, MIXED: ICD-10-CM

## 2021-01-04 DIAGNOSIS — Z12.5 SPECIAL SCREENING FOR MALIGNANT NEOPLASM OF PROSTATE: ICD-10-CM

## 2021-01-04 LAB
CHOLESTEROL, TOTAL: 218 MG/DL (ref 0–199)
HDLC SERPL-MCNC: 39 MG/DL (ref 40–60)
LDL CHOLESTEROL CALCULATED: 134 MG/DL
PROSTATE SPECIFIC ANTIGEN: 0.72 NG/ML (ref 0–4)
TRIGL SERPL-MCNC: 223 MG/DL (ref 0–150)
TSH REFLEX FT4: 2.12 UIU/ML (ref 0.27–4.2)
VLDLC SERPL CALC-MCNC: 45 MG/DL

## 2021-01-05 LAB
ESTIMATED AVERAGE GLUCOSE: 114 MG/DL
HBA1C MFR BLD: 5.6 %
VITAMIN B-12: 1459 PG/ML (ref 211–911)

## 2021-02-22 DIAGNOSIS — Z94.4 LIVER TRANSPLANT STATUS (HCC): ICD-10-CM

## 2021-02-22 LAB
CREATININE URINE: 236 MG/DL (ref 39–259)
MICROALBUMIN UR-MCNC: <1.2 MG/DL
MICROALBUMIN/CREAT UR-RTO: NORMAL MG/G (ref 0–30)

## 2021-03-02 ENCOUNTER — NURSE ONLY (OUTPATIENT)
Dept: INTERNAL MEDICINE CLINIC | Age: 61
End: 2021-03-02
Payer: COMMERCIAL

## 2021-03-02 DIAGNOSIS — Z23 NEED FOR SHINGLES VACCINE: Primary | ICD-10-CM

## 2021-03-02 PROCEDURE — 90750 HZV VACC RECOMBINANT IM: CPT | Performed by: INTERNAL MEDICINE

## 2021-03-02 PROCEDURE — 90471 IMMUNIZATION ADMIN: CPT | Performed by: INTERNAL MEDICINE

## 2021-04-20 RX ORDER — PANTOPRAZOLE SODIUM 40 MG/1
TABLET, DELAYED RELEASE ORAL
Qty: 30 TABLET | Refills: 3 | Status: SHIPPED | OUTPATIENT
Start: 2021-04-20 | End: 2021-07-22

## 2021-07-02 ENCOUNTER — CARE COORDINATION (OUTPATIENT)
Dept: CARE COORDINATION | Age: 61
End: 2021-07-02

## 2021-07-02 NOTE — CARE COORDINATION
First attempt to reach the pt by the RNEDITH. The RN, Ambulatory Care Manager called and left a message with the nurse's call back number asking the pt to return the nurse's call. The RN, ACM offered Care Coordination to assist with any healthcare needs.

## 2021-07-09 ENCOUNTER — CARE COORDINATION (OUTPATIENT)
Dept: CARE COORDINATION | Age: 61
End: 2021-07-09

## 2021-07-09 NOTE — CARE COORDINATION
RN, ACM Note:  The RN, ACM spoke with the pt and offered Care Coordination. The pt stated he is doing well. The pt has been very compliant and denied any current needs. The RN, ACM encouraged the pt to call the nurse if any health care issues arise. The pt thanked the nurse for calling.

## 2021-10-01 ENCOUNTER — TELEPHONE (OUTPATIENT)
Dept: ENDOCRINOLOGY | Age: 61
End: 2021-10-01

## 2021-11-03 ENCOUNTER — PATIENT MESSAGE (OUTPATIENT)
Dept: CARE COORDINATION | Age: 61
End: 2021-11-03

## 2021-11-04 ENCOUNTER — TELEPHONE (OUTPATIENT)
Dept: INTERNAL MEDICINE CLINIC | Age: 61
End: 2021-11-04

## 2021-11-04 NOTE — TELEPHONE ENCOUNTER
From: Eriberto Cabrera  To: Nurse Darlyn Osvaldo  Sent: 11/3/2021 7:59 PM EDT  Subject: Non-Urgent Medical Question    Hello Nurse Amanda Verduzco,    Given the jail of Dr. Jessica Michelle, can you recommend a PCP within the office that can accept me as a patient? If so, I will need to make an appointment as soon as possible because my yearly physical is due next month, and I need for my Protonix prescription to be refilled (only have a couple of weeks left with no refills remaining).     Many Thanks,  Avelina Lawrence

## 2021-11-05 ENCOUNTER — PATIENT MESSAGE (OUTPATIENT)
Dept: CARE COORDINATION | Age: 61
End: 2021-11-05

## 2021-11-08 ENCOUNTER — TELEPHONE (OUTPATIENT)
Dept: INTERNAL MEDICINE CLINIC | Age: 61
End: 2021-11-08

## 2021-11-08 ENCOUNTER — CARE COORDINATION (OUTPATIENT)
Dept: CARE COORDINATION | Age: 61
End: 2021-11-08

## 2021-11-08 RX ORDER — PANTOPRAZOLE SODIUM 40 MG/1
TABLET, DELAYED RELEASE ORAL
Qty: 30 TABLET | Refills: 3 | Status: SHIPPED | OUTPATIENT
Start: 2021-11-08 | End: 2022-03-16

## 2021-11-08 NOTE — TELEPHONE ENCOUNTER
Pharmacy called into the office to request a refill       pantoprazole (PROTONIX) 40 MG tablet     [facility] [address] [phone]      Please advise.

## 2021-11-08 NOTE — CARE COORDINATION
RN, ACM Note:  The pt agreed to work with the RN, ACM through a [employer]. The RN, ACM will follow up with the pt this week.

## 2021-11-10 ENCOUNTER — PATIENT MESSAGE (OUTPATIENT)
Dept: CARE COORDINATION | Age: 61
End: 2021-11-10

## 2021-11-11 ENCOUNTER — CARE COORDINATION (OUTPATIENT)
Dept: CARE COORDINATION | Age: 61
End: 2021-11-11

## 2021-11-11 ENCOUNTER — TELEPHONE (OUTPATIENT)
Dept: INTERNAL MEDICINE CLINIC | Age: 61
End: 2021-11-11

## 2021-11-11 ENCOUNTER — PATIENT MESSAGE (OUTPATIENT)
Dept: CARE COORDINATION | Age: 61
End: 2021-11-11

## 2021-11-11 NOTE — CARE COORDINATION
Ambulatory Care Coordination Note  11/11/2021  CM Risk Score: 4  Charlson 10 Year Mortality Risk Score: 98%     ACC: Shelley Kaye, RN [facility]    Hx: HTN, Liver Transplant d/t Cirrhosis of the Liver, Lesion of Lung, Immunocompromised, CKD, Neuropathy, Elevated MCV, Hyperlipidemia    Summary Note: The pt stated he sees a Hepatologist at [facility] since his Liver Transplant 1 year ago. The pt stated he also sees a nephrologist for his kidneys and an endocrinologist for osteoporosis. The pt stated he is due for his yearly DEXA Scan and Reclast Infusion. The pt stated he does take Calcium and Vitamin D. The pt stated he exercises at the gym 3 times a week. Pt recevied his COVID-19 booster and his Flu vaccine. The pt asked the RN, ACM's assistance in transitioning to a new PCP in the office. The RN, ACM informed the pt to establish care with a new provider in the office he will need to make an in-person appointment. Plan:  The pt made an appointment with a new PCP for a Physical on 12/16/21. The pt asked the RN, ACM to have the new PCP order his lab work so he can have it drawn right before his appointment. The pt agreed to further calls from the RN, ACM. The pt will call for an appointment with the endocrinologist.    Ambulatory Care Coordination Assessment    Care Coordination Protocol  Program Enrollment: Complex Care  Referral from Primary Care Provider: No  Week 1 - Initial Assessment     Do you have all of your prescriptions and are they filled?: Yes  Are you able to afford your medications?: Yes  How often do you have trouble taking your medications the way you have been told to take them?: I always take them as prescribed.      Do you have Home O2 Therapy?: No      Is patient able to live independently?: Yes     Current Housing: Private Residence  For whom are you the caregiver?: Mother  Does the person that you care for see a [facility] PCP?: Yes              Are you experiencing loss of meaning?: No  Are you experiencing loss of hope and peace?: No     Thinking about your patient's physical health needs, are there any symptoms or problems (risk indicators) you are unsure about that require further investigation?: No identified areas of uncertainly or problems already being investigated   Are the patients physical health problems impacting on their mental well-being?: No identified areas of concern   Are there any problems with your patients lifestyle behaviors (alcohol, drugs, diet, exercise) that are impacting on physical or mental well-being?: No identified areas of concern   Do you have any other concerns about your patients mental well-being? How would you rate their severity and impact on the patient?: No identified areas of concern   How would you rate their home environment in terms of safety and stability (including domestic violence, insecure housing, neighbor harassment)?: Consistently safe, supportive, stable, no identified problems   How do daily activities impact on the patient's well-being? (include current or anticipated unemployment, work, caregiving, access to transportation or other): No identified problems or perceived positive benefits   How would you rate their social network (family, work, friends)?: Good participation with social networks   How would you rate their financial resources (including ability to afford all required medical care)?: Financially secure, resources adequate, no identified problems   How wells does the patient now understand their health and well-being (symptoms, signs or risk factors) and what they need to do to manage their health?: Reasonable to good understanding and already engages in managing health or is willing to undertake better management   How well do you think your patient can engage in healthcare discussions?  (Barriers include language, deafness, aphasia, alcohol or drug problems, learning difficulties, concentration): Clear and open communication, no identified barriers   Do other services need to be involved to help this patient?: Other care/services not required at this time   Are current services involved with this patient well-coordinated? (Include coordination with other services you are now recommendation): All required care/services in place and well-coordinated   Suggested Interventions and Community Resources   Other Services or Interventions: Liver Transplant Team at [facility]   Zone Management Tools: In Process         Set up/Review Goals, Set up/Review an Education Plan              Prior to Admission medications    Medication Sig Start Date End Date Taking?  Authorizing Provider   pantoprazole (PROTONIX) 40 MG tablet Take 1 tablet by mouth every morning before breakfast 11/8/21  Yes Sukhjinder Penn MD   carvedilol (COREG) 12.5 MG tablet TAKE ONE TABLET BY MOUTH TWICE A DAY WITH MEALS 10/1/21  Yes Carla Jay MD   diphenhydrAMINE (BENADRYL) 50 MG tablet Take 50 mg by mouth nightly as needed for Itching   Yes Historical Provider, MD   acetaminophen (TYLENOL) 500 MG tablet Take 325 mg by mouth every 6 hours as needed   Yes Historical Provider, MD   tacrolimus (PROGRAF) 1 MG capsule Take 1 mg by mouth 2 times daily   Yes Historical Provider, MD   mycophenolate (CELLCEPT) 500 MG tablet Take 500 mg by mouth 2 times daily   Yes Historical Provider, MD   Multiple Vitamins-Minerals (THERAPEUTIC MULTIVITAMIN-MINERALS) tablet Take 1 tablet by mouth daily   Yes Historical Provider, MD       Future Appointments   Date Time Provider Bren Pool   12/10/2021 10:50 AM Chan Richard MD [facility]   3/1/2022 12:00 PM Carla Jay MD [facility]      and   General Assessment    Do you have any symptoms that are causing concern?: No

## 2021-11-11 NOTE — TELEPHONE ENCOUNTER
----- Message from Selena Ortez sent at 11/11/2021 11:06 AM EST -----  Subject: Appointment Request    Reason for Call: Routine Physical Exam    QUESTIONS  Type of Appointment? New Patient/New to Provider  Reason for appointment request? Requested Provider unavailable - Dr. Sherryle Hoyles  Additional Information for Provider? Dr. Albino Melara referred Emeli Son to   Dr. Sherryle Hoyles and is wanting to establish care asap and get in for a yearly   physical and he is available any day and time. ---------------------------------------------------------------------------  --------------  Michael SHEN  What is the best way for the office to contact you? OK to leave message on   voicemail  Preferred Call Back Phone Number? [phone]  ---------------------------------------------------------------------------  --------------  SCRIPT ANSWERS  Relationship to Patient? Self  (If the patient has Medicare as their primary insurance coverage ask this   question) Are you requesting a Medicare Annual Wellness Visit? No  (Is the patient requesting a pap smear with their physical exam?)? No  (Is the patient requesting their annual physical and does not need PAP or   AWV per above?)? Yes   Have you been diagnosed with, awaiting test results for, or told that you   are suspected of having COVID-19 (Coronavirus)? (If patient has tested   negative or was tested as a requirement for work, school, or travel and   not based on symptoms, answer no)? No  Within the past two weeks have you developed any of the following symptoms   (answer no if symptoms have been present longer than 2 weeks or began   more than 2 weeks ago)? Fever or Chills, Cough, Shortness of breath or   difficulty breathing, Loss of taste or smell, Sore throat, Nasal   congestion, Sneezing or runny nose, Fatigue or generalized body aches   (answer no if pain is specific to a body part e.g. back pain), Diarrhea,   Headache?  No  Have you had close contact with someone with COVID-19 in the last 14 days? No  (Service Expert  click yes below to proceed with [employer]   Scheduling)?  Yes

## 2021-11-11 NOTE — CARE COORDINATION
RN, ACM Note:  The RN, ACM received a [employer] message that the pt had a scheduled e-visit with Dr. Caleb Chew but he lost the link. The EDITH SERRA spoke with Dr. Caleb Chew MA and was informed the PCP does not do e-visits. The pt needs to make an in person visit to establish care. The RN, ACM called and left a message for the pt explaining that he must schedule an in person visit with Dr. Caleb Chew. The Ambulatory Care Manager left a voice message asking the pt to return the nurse's call at the pt's convenience.

## 2021-11-12 RX ORDER — PANTOPRAZOLE SODIUM 40 MG/1
TABLET, DELAYED RELEASE ORAL
Qty: 30 TABLET | Refills: 3 | Status: SHIPPED | OUTPATIENT
Start: 2021-11-12

## 2021-11-18 ENCOUNTER — CARE COORDINATION (OUTPATIENT)
Dept: CARE COORDINATION | Age: 61
End: 2021-11-18

## 2021-11-18 DIAGNOSIS — E78.00 PURE HYPERCHOLESTEROLEMIA: Primary | ICD-10-CM

## 2021-11-18 RX ORDER — ATORVASTATIN CALCIUM 40 MG/1
40 TABLET, FILM COATED ORAL DAILY
Qty: 90 TABLET | Refills: 1 | Status: SHIPPED | OUTPATIENT
Start: 2021-11-18 | End: 2022-05-10 | Stop reason: SDUPTHER

## 2021-11-18 NOTE — CARE COORDINATION
The RN, ACM left the pt a message in r/t labs for his upcoming appointment with his new PCP. The RN, ACM told the pt that she put in another note asking if lab work was needed before his appointment. The RN, ACM told the pt that she could see that the pt just had labs done by Dr. Donta Greenberg and he may not need additional lab work at this time. The Ambulatory Care Manager asked the pt to return the nurse's call at the pt's convenience.

## 2021-11-18 NOTE — TELEPHONE ENCOUNTER
Murray Childers,    Reviewed the labs that he had with Dr. Donta Greenberg. His cholesterol levels were significantly high. I have ordered a cholesterol medicine for him to  from [facility]. He should start using the medication as prescribed. I have ordered follow-up labs which need to be done fasting prior to the visit with me.

## 2021-12-01 DIAGNOSIS — Z00.00 PREVENTATIVE HEALTH CARE: Primary | ICD-10-CM

## 2021-12-10 ENCOUNTER — VIRTUAL VISIT (OUTPATIENT)
Dept: ENDOCRINOLOGY | Age: 61
End: 2021-12-10
Payer: COMMERCIAL

## 2021-12-10 DIAGNOSIS — M81.0 AGE-RELATED OSTEOPOROSIS WITHOUT CURRENT PATHOLOGICAL FRACTURE: Primary | ICD-10-CM

## 2021-12-10 DIAGNOSIS — E83.52 HYPERCALCEMIA: ICD-10-CM

## 2021-12-10 PROCEDURE — 3017F COLORECTAL CA SCREEN DOC REV: CPT | Performed by: INTERNAL MEDICINE

## 2021-12-10 PROCEDURE — 99214 OFFICE O/P EST MOD 30 MIN: CPT | Performed by: INTERNAL MEDICINE

## 2021-12-10 PROCEDURE — G8427 DOCREV CUR MEDS BY ELIG CLIN: HCPCS | Performed by: INTERNAL MEDICINE

## 2021-12-10 RX ORDER — SODIUM CHLORIDE 9 MG/ML
INJECTION, SOLUTION INTRAVENOUS CONTINUOUS
Status: DISCONTINUED | OUTPATIENT
Start: 2021-12-10 | End: 2021-12-10 | Stop reason: HOSPADM

## 2021-12-10 RX ORDER — ALBUTEROL SULFATE 90 UG/1
4 AEROSOL, METERED RESPIRATORY (INHALATION) PRN
OUTPATIENT
Start: 2021-12-10

## 2021-12-10 RX ORDER — 0.9 % SODIUM CHLORIDE 0.9 %
250 INTRAVENOUS SOLUTION INTRAVENOUS ONCE
Status: CANCELLED | OUTPATIENT
Start: 2021-12-10 | End: 2021-12-10

## 2021-12-10 RX ORDER — DIPHENHYDRAMINE HYDROCHLORIDE 50 MG/ML
50 INJECTION INTRAMUSCULAR; INTRAVENOUS ONCE
Status: DISCONTINUED | OUTPATIENT
Start: 2021-12-10 | End: 2021-12-10 | Stop reason: HOSPADM

## 2021-12-10 RX ORDER — DIPHENHYDRAMINE HYDROCHLORIDE 50 MG/ML
50 INJECTION INTRAMUSCULAR; INTRAVENOUS
OUTPATIENT
Start: 2021-12-10

## 2021-12-10 RX ORDER — SODIUM CHLORIDE 0.9 % (FLUSH) 0.9 %
5 SYRINGE (ML) INJECTION PRN
Status: DISCONTINUED | OUTPATIENT
Start: 2021-12-10 | End: 2021-12-10 | Stop reason: HOSPADM

## 2021-12-10 RX ORDER — SODIUM CHLORIDE 0.9 % (FLUSH) 0.9 %
10 SYRINGE (ML) INJECTION PRN
Status: DISCONTINUED | OUTPATIENT
Start: 2021-12-10 | End: 2021-12-10 | Stop reason: HOSPADM

## 2021-12-10 RX ORDER — EPINEPHRINE 1 MG/ML
0.3 INJECTION, SOLUTION, CONCENTRATE INTRAVENOUS PRN
OUTPATIENT
Start: 2021-12-10

## 2021-12-10 RX ORDER — HEPARIN SODIUM (PORCINE) LOCK FLUSH IV SOLN 100 UNIT/ML 100 UNIT/ML
500 SOLUTION INTRAVENOUS PRN
Status: DISCONTINUED | OUTPATIENT
Start: 2021-12-10 | End: 2021-12-10 | Stop reason: HOSPADM

## 2021-12-10 RX ORDER — SODIUM CHLORIDE 9 MG/ML
INJECTION, SOLUTION INTRAVENOUS CONTINUOUS
OUTPATIENT
Start: 2021-12-10

## 2021-12-10 RX ORDER — ZOLEDRONIC ACID 5 MG/100ML
5 INJECTION, SOLUTION INTRAVENOUS ONCE
Status: CANCELLED | OUTPATIENT
Start: 2021-12-10 | End: 2021-12-10

## 2021-12-10 RX ORDER — SODIUM CHLORIDE 9 MG/ML
INJECTION, SOLUTION INTRAVENOUS ONCE
Status: CANCELLED | OUTPATIENT
Start: 2021-12-10 | End: 2021-12-10

## 2021-12-10 RX ORDER — ONDANSETRON 2 MG/ML
8 INJECTION INTRAMUSCULAR; INTRAVENOUS
OUTPATIENT
Start: 2021-12-10

## 2021-12-10 RX ORDER — EPINEPHRINE 1 MG/ML
0.3 INJECTION, SOLUTION, CONCENTRATE INTRAVENOUS PRN
Status: DISCONTINUED | OUTPATIENT
Start: 2021-12-10 | End: 2021-12-10 | Stop reason: HOSPADM

## 2021-12-10 RX ORDER — ACETAMINOPHEN 325 MG/1
650 TABLET ORAL
OUTPATIENT
Start: 2021-12-10

## 2021-12-10 RX ORDER — SODIUM CHLORIDE 0.9 % (FLUSH) 0.9 %
5-40 SYRINGE (ML) INJECTION PRN
OUTPATIENT
Start: 2021-12-10

## 2021-12-10 RX ORDER — METHYLPREDNISOLONE SODIUM SUCCINATE 125 MG/2ML
125 INJECTION, POWDER, LYOPHILIZED, FOR SOLUTION INTRAMUSCULAR; INTRAVENOUS ONCE
Status: DISCONTINUED | OUTPATIENT
Start: 2021-12-10 | End: 2021-12-10 | Stop reason: HOSPADM

## 2021-12-10 RX ORDER — SODIUM CHLORIDE 9 MG/ML
25 INJECTION, SOLUTION INTRAVENOUS PRN
OUTPATIENT
Start: 2021-12-10

## 2021-12-10 RX ORDER — HEPARIN SODIUM (PORCINE) LOCK FLUSH IV SOLN 100 UNIT/ML 100 UNIT/ML
500 SOLUTION INTRAVENOUS PRN
OUTPATIENT
Start: 2021-12-10

## 2021-12-10 NOTE — PROGRESS NOTES
Subjective: Mark Herron is a  who is here for f/u evaluation of osteoporosis. Pursuant to the emergency declaration under the [address] [place] [address], Community Health waiver authority and the [employer] Act, this Virtual  Visit was conducted, with patient's consent, to reduce the patient's risk of exposure to COVID-19 and provide continuity of care for an established patient. Patient was at home. Provider was at home. No one else was involved  Services were provided through a video synchronous discussion virtually to substitute for in-person clinic visit. Interim:  No falls  No fractures    Last Reclast 4/19, 11/20 11/19 he had transplant of liver    He was diagnosed with Osteoporsis    DEXA scan 11/18    FINDINGS:  Spine (L1-L4):  BMD:  0.766 g/cm2   T-score: -3.0 SD   Z-score: -3.3 SD     Left femoral neck:   BMD: 0.676 g/cm2  T-score: -1.9  SD  Z-score: -1.4 SD    Left total hip:   BMD: 0.800 g/cm2   T-score: -1.5 SD   Z-score: -1.4 SD     Right femoral neck:   BMD: 0.641 g/cm2  T-score: -2.1  SD  Z-score: -1.6 SD    Right total hip:   BMD: 0.769 g/cm2   T-score: -1.7 SD   Z-score: -1.6 SD     Moderate, uncontrolled    History of fractures :  None   Pharmacological therapy for Osteoprosis:  None     FH of Osteoporosis:Mom   FH of hip fracture:none    Secondary causes of osteoprorsis: Liver Cirrhosis , Alcohol use, smoking    Calcium:  Diet: 600mg  Oyster shell Ca / Vit D 500mg BID  Vitamin D: 93 on 2/17  ----> Vit D 55  Exercise: none    Follows up regularly with a dentist. No major dental procedures planned.     He was found to have high Ca on 12/18  Ca 10.5 previous level normal    H/o esophageal banding    2/19 Ca 24 hour 130 PTH 6  Vit D 42  TSH 1.91  Ca 10.2  Testosterone 180    Dexa 10/20    A single low resolution AP image of the lumbar spine was performed and shows the total bone mineral density to measure 0.777 with (TYLENOL) 500 MG tablet Take 325 mg by mouth every 6 hours as needed      tacrolimus (PROGRAF) 1 MG capsule Take 1 mg by mouth 2 times daily      mycophenolate (CELLCEPT) 500 MG tablet Take 500 mg by mouth 2 times daily      Multiple Vitamins-Minerals (THERAPEUTIC MULTIVITAMIN-MINERALS) tablet Take 1 tablet by mouth daily       No current facility-administered medications for this visit. Review of Systems  Constitutional: Negative for weight loss and malaise/fatigue. Negative for fever and chills. HENT: Negative for hearing loss, ear pain, nosebleeds, neck pain and tinnitus. Eyes: Negative for blurred vision. Negative for double vision, photophobia and pain. Respiratory: Negative for cough and sputum production. Cardiovascular: Negative for chest pain, palpitations and leg swelling. Gastrointestinal: Negative for nausea, vomiting and abdominal pain. Genitourinary: Negative for dysuria, urgency and frequency. +incontinence  Musculoskeletal: Negative for back pain. No joint pain  Skin: Negative for itching and rash. Neurological: Negative for dizziness. Negative for tingling, tremors, focal weakness and headaches. Endo/Heme/Allergies: see HPI  Psychiatric/Behavioral: Negative for depression and substance abuse. PHYSICAL EXAMINATION:  [ INSTRUCTIONS:  \"[x]\" Indicates a positive item  \"[]\" Indicates a negative item  -- DELETE ALL ITEMS NOT EXAMINED]  Vital Signs: (As obtained by patient/caregiver or practitioner observation)    Blood pressure-  Heart rate-    Respiratory rate-    Temperature-  Pulse oximetry-     Constitutional Appears well-developed and well-nourished No apparent distress        Mental status  Alert and awake  Oriented to person/place/time  Able to follow commands      Eyes:  EOM    [x]  Normal    Sclera  [x]  Normal           Discharge [x]  None visible      HENT:   [x] Normocephalic, atraumatic.     [x] Mouth/Throat: Mucous membranes are moist.     External Ears [x] Normal  no discharge    Neck: [x] No visualized mass  no swelling    Pulmonary/Chest: [x] Respiratory effort normal.  [x] No visualized signs of difficulty breathing or respiratory distress             Musculoskeletal:   [x] Normal gait with no signs of ataxia         [x] Normal range of motion of neck          Head and neck stable, appears normal ROM, strength good    Neurological:        [x] No Facial Asymmetry (Cranial nerve 7 motor function) (limited exam to video visit)          [x] No gaze palsy                Skin:        [x] No significant exanthematous lesions or discoloration noted on facial skin                 Psychiatric:       [x] Normal Affect [x] No Hallucinations            Other pertinent observable physical exam findings-     Due to this being a TeleHealth encounter, evaluation of the following organ systems is limited: Vitals/Constitutional/EENT/Resp/CV/GI//MS/Neuro/Skin/Heme-Lymph-Imm. Services were provided through a video synchronous discussion virtually to substitute for in-person clinic visit. Lab Review  No results found for: TSH  No results found for: FREET4      Assessment:     1. Osteoporosis: Low BMD at spine, Z score is also low. BMD in osteopenia range in hips. Discussed risk of fractures. He has risk factors for osteoporosis. Discussed management, needs pharmacological therapy. Avoid Bisphosphonate given h/o varices. Ordered Reclast. He had 4/19,11/20  is past due for infusion. Will reorder. Last Dexa 10/20 showed osteoporosis, no comparison as different machine  Testosterone low normal.  2.Hypercalcemia : Recheck Ca normal. PTH low. Repeat normal  3. Liver cirrhosis : S/p transplant     Plan:     Discussed management of osteoporosis in detail. Discussed treatment options. Told him that given high risk of fracture and osteoporosis, I would recommend Bisphosphonate therapy .  Started Reclast. He is due   Discussed the side effects including Osteonecrosis of the jaw and Atypical fractures. Patient understands that the risk is low. He is willing to take the medication. In general advised to avoid falls, use night lights at night.    Dexa 12/22

## 2021-12-13 DIAGNOSIS — Z00.00 PREVENTATIVE HEALTH CARE: ICD-10-CM

## 2021-12-13 DIAGNOSIS — E78.00 PURE HYPERCHOLESTEROLEMIA: ICD-10-CM

## 2021-12-13 LAB
ALBUMIN SERPL-MCNC: 4.7 G/DL (ref 3.4–5)
ALP BLD-CCNC: 98 U/L (ref 40–129)
ALT SERPL-CCNC: 24 U/L (ref 10–40)
AST SERPL-CCNC: 34 U/L (ref 15–37)
BILIRUB SERPL-MCNC: 0.7 MG/DL (ref 0–1)
BILIRUBIN DIRECT: <0.2 MG/DL (ref 0–0.3)
BILIRUBIN, INDIRECT: NORMAL MG/DL (ref 0–1)
CHOLESTEROL, TOTAL: 157 MG/DL (ref 0–199)
HDLC SERPL-MCNC: 48 MG/DL (ref 40–60)
LDL CHOLESTEROL CALCULATED: 90 MG/DL
TOTAL PROTEIN: 7.8 G/DL (ref 6.4–8.2)
TRIGL SERPL-MCNC: 94 MG/DL (ref 0–150)
VLDLC SERPL CALC-MCNC: 19 MG/DL

## 2021-12-14 LAB
HIV AG/AB: NORMAL
HIV ANTIGEN: NORMAL
HIV-1 ANTIBODY: NORMAL
HIV-2 AB: NORMAL

## 2021-12-16 ENCOUNTER — OFFICE VISIT (OUTPATIENT)
Dept: INTERNAL MEDICINE CLINIC | Age: 61
End: 2021-12-16
Payer: COMMERCIAL

## 2021-12-16 VITALS
HEART RATE: 71 BPM | OXYGEN SATURATION: 100 % | WEIGHT: 209.4 LBS | SYSTOLIC BLOOD PRESSURE: 126 MMHG | DIASTOLIC BLOOD PRESSURE: 66 MMHG | HEIGHT: 73 IN | TEMPERATURE: 97.3 F | BODY MASS INDEX: 27.75 KG/M2

## 2021-12-16 DIAGNOSIS — I10 ESSENTIAL HYPERTENSION: ICD-10-CM

## 2021-12-16 DIAGNOSIS — Z12.5 SPECIAL SCREENING FOR MALIGNANT NEOPLASM OF PROSTATE: ICD-10-CM

## 2021-12-16 DIAGNOSIS — K70.9 LIVER DISEASE, CHRONIC, DUE TO ALCOHOL (HCC): ICD-10-CM

## 2021-12-16 DIAGNOSIS — E78.2 HYPERLIPIDEMIA, MIXED: Primary | ICD-10-CM

## 2021-12-16 DIAGNOSIS — Z00.00 ENCOUNTER FOR WELL ADULT EXAM WITHOUT ABNORMAL FINDINGS: ICD-10-CM

## 2021-12-16 DIAGNOSIS — Z00.00 PREVENTATIVE HEALTH CARE: ICD-10-CM

## 2021-12-16 PROCEDURE — G8419 CALC BMI OUT NRM PARAM NOF/U: HCPCS | Performed by: HOSPITALIST

## 2021-12-16 PROCEDURE — G8484 FLU IMMUNIZE NO ADMIN: HCPCS | Performed by: HOSPITALIST

## 2021-12-16 PROCEDURE — 1036F TOBACCO NON-USER: CPT | Performed by: HOSPITALIST

## 2021-12-16 PROCEDURE — 3017F COLORECTAL CA SCREEN DOC REV: CPT | Performed by: HOSPITALIST

## 2021-12-16 PROCEDURE — 99214 OFFICE O/P EST MOD 30 MIN: CPT | Performed by: HOSPITALIST

## 2021-12-16 PROCEDURE — G8427 DOCREV CUR MEDS BY ELIG CLIN: HCPCS | Performed by: HOSPITALIST

## 2021-12-16 SDOH — ECONOMIC STABILITY: FOOD INSECURITY: WITHIN THE PAST 12 MONTHS, YOU WORRIED THAT YOUR FOOD WOULD RUN OUT BEFORE YOU GOT MONEY TO BUY MORE.: NEVER TRUE

## 2021-12-16 SDOH — ECONOMIC STABILITY: FOOD INSECURITY: WITHIN THE PAST 12 MONTHS, THE FOOD YOU BOUGHT JUST DIDN'T LAST AND YOU DIDN'T HAVE MONEY TO GET MORE.: NEVER TRUE

## 2021-12-16 ASSESSMENT — ENCOUNTER SYMPTOMS
CHEST TIGHTNESS: 0
BLOOD IN STOOL: 0
NAUSEA: 0
VOMITING: 0
CONSTIPATION: 0
BACK PAIN: 0
TROUBLE SWALLOWING: 0
VOICE CHANGE: 0
WHEEZING: 0
SINUS PAIN: 0
ABDOMINAL PAIN: 0
DIARRHEA: 0
SINUS PRESSURE: 0
SORE THROAT: 0
COUGH: 0
ABDOMINAL DISTENTION: 0
SHORTNESS OF BREATH: 0

## 2021-12-16 ASSESSMENT — PATIENT HEALTH QUESTIONNAIRE - PHQ9
1. LITTLE INTEREST OR PLEASURE IN DOING THINGS: 0
2. FEELING DOWN, DEPRESSED OR HOPELESS: 0
SUM OF ALL RESPONSES TO PHQ9 QUESTIONS 1 & 2: 0
SUM OF ALL RESPONSES TO PHQ QUESTIONS 1-9: 0

## 2021-12-16 ASSESSMENT — SOCIAL DETERMINANTS OF HEALTH (SDOH): HOW HARD IS IT FOR YOU TO PAY FOR THE VERY BASICS LIKE FOOD, HOUSING, MEDICAL CARE, AND HEATING?: NOT HARD AT ALL

## 2021-12-16 NOTE — PROGRESS NOTES
Well Adult Note  Name: Jenny Pretty Date: 2021   MRN: <[id number]> Sex: Male   Age: 63 y.o. Ethnicity: Non- / Non    : 1960 Race: Black / Prasanna Jacobs is here for well adult exam.  History:    -Exercises 3 x weekly at the gym.  -No current smoking. Liver disease, chronic, due to alcohol ([address]  S/P transplant 2019 at [facility]. On Tacrolimus and Cellcept. Follows with Dr. Merissa Sorensen at [facility]. Had HRS. Follows with Dr. Chacho Wolf. Last Cr=1.2 with GFR>60 11/15/2021.     Hyperlipidemia  Well controlled on Atorvastatin 40 by the 2021 labs.       Hypertension  BP controlled on carvedilol     Former cigarette smoker  Denies smoking      Review of Systems   Constitutional: Negative for activity change, appetite change, chills, diaphoresis, fatigue, fever and unexpected weight change. HENT: Negative for sinus pressure, sinus pain, sore throat, trouble swallowing and voice change. Eyes: Negative for visual disturbance. Respiratory: Negative for cough, chest tightness, shortness of breath and wheezing. Cardiovascular: Negative for chest pain, palpitations and leg swelling. Gastrointestinal: Negative for abdominal distention, abdominal pain, blood in stool, constipation, diarrhea, nausea and vomiting. Endocrine: Negative for polydipsia and polyphagia. Genitourinary: Negative for decreased urine volume, difficulty urinating, dysuria and urgency. Musculoskeletal: Negative for back pain, gait problem, joint swelling and myalgias. Neurological: Negative for dizziness, seizures, syncope, light-headedness and headaches. Psychiatric/Behavioral: Negative for agitation, behavioral problems, confusion and suicidal ideas. No Known Allergies      Prior to Visit Medications    Medication Sig Taking?  Authorizing Provider   atorvastatin (LIPITOR) 40 MG tablet Take 1 tablet by mouth daily Yes Orlando Cruz MD   pantoprazole (PROTONIX) 40 MG tablet Take 1 tablet (40 mg total) by mouth every morning before breakfast. Yes Eliana Garza MD   pantoprazole (PROTONIX) 40 MG tablet Take 1 tablet by mouth every morning before breakfast Yes Eliana Garza MD   carvedilol (COREG) 12.5 MG tablet TAKE ONE TABLET BY MOUTH TWICE A DAY WITH MEALS Yes Hayes Ibarra MD   diphenhydrAMINE (BENADRYL) 50 MG tablet Take 50 mg by mouth nightly as needed for Itching Yes Historical Provider, MD   acetaminophen (TYLENOL) 500 MG tablet Take 325 mg by mouth every 6 hours as needed Yes Historical Provider, MD   tacrolimus (PROGRAF) 1 MG capsule Take 1 mg by mouth 2 times daily Yes Historical Provider, MD   mycophenolate (CELLCEPT) 500 MG tablet Take 500 mg by mouth 2 times daily Yes Historical Provider, MD   Multiple Vitamins-Minerals (THERAPEUTIC MULTIVITAMIN-MINERALS) tablet Take 1 tablet by mouth daily Yes Historical Provider, MD         Past Medical History:   Diagnosis Date    [facility] [address]     Hyperlipidemia     Hypertension        Past Surgical History:   Procedure Laterality Date    KNEE SURGERY Right 1969    fall; glass injury - laceration    IN ESOPHAGOGASTRODUODENOSCOPY TRANSORAL DIAGNOSTIC N/A 2018    EGD ESOPHAGOGASTRODUODENOSCOPY WITH ESOPHAGEAL VARICES BANDING X 2 WITH MAC ANESTHESIA performed by Hardy Montgomery MD at [facility] ENDOSCOPY N/A 2018    EGD BAND LIGATION OF ESOPHAGEAL VARICES X3 WITH MAC performed by Hardy Montgomery MD at [address] [place] [address] ENDOSCOPY         Family History   Problem Relation Age of Onset    Diabetes Mother     Heart Failure Mother        Social History     Tobacco Use    Smoking status: Former Smoker     Packs/day: 0.50     Years: 38.00     Pack years: 19.00     Types: Cigarettes     Start date: 1979     Quit date: 2017     Years since quittin.6    Smokeless tobacco: Never Used    Tobacco comment: has not smoked in 6mo   Vaping Use    Vaping Use: Never used Substance Use Topics    Alcohol use: Yes     Alcohol/week: 0.0 standard drinks     Comment: 2 per day- not for 5months    Drug use: No       Objective   /66   Pulse 71   Temp 97.3 °F (36.3 °C)   Ht 6' 1\" (1.854 m)   Wt 209 lb 6.4 oz (95 kg)   SpO2 100%   BMI 27.63 kg/m²   Wt Readings from Last 3 Encounters:   12/16/21 209 lb 6.4 oz (95 kg)   08/26/21 202 lb 1.6 oz (91.7 kg)   02/25/21 202 lb 12.8 oz (92 kg)     There were no vitals filed for this visit. Physical Exam  Vitals reviewed. Constitutional:       General: He is not in acute distress. Appearance: Normal appearance. HENT:      Head: Normocephalic and atraumatic. Mouth/Throat:      Pharynx: Oropharynx is clear. Eyes:      Conjunctiva/sclera: Conjunctivae normal.      Pupils: Pupils are equal, round, and reactive to light. Cardiovascular:      Rate and Rhythm: Normal rate and regular rhythm. Pulses: Normal pulses. Heart sounds: Normal heart sounds. Pulmonary:      Effort: Pulmonary effort is normal. No respiratory distress. Breath sounds: Normal breath sounds. No wheezing or rales. Abdominal:      Palpations: Abdomen is soft. Tenderness: There is no abdominal tenderness. There is no rebound. Musculoskeletal:         General: No signs of injury. Normal range of motion. Cervical back: Normal range of motion and neck supple. Skin:     General: Skin is warm and dry. Coloration: Skin is not jaundiced. Neurological:      General: No focal deficit present. Mental Status: He is alert and oriented to person, place, and time. Psychiatric:         Behavior: Behavior normal.         Thought Content: Thought content normal.         Judgment: Judgment normal.           Assessment   Plan   1. Hyperlipidemia, mixed  -     LIPID PANEL; Future  -     COMPREHENSIVE METABOLIC PANEL; Future  2. Preventative health care  -     PSA, Prostatic Specific Antigen;  Future  -     TSH with Reflex; Future  3. Liver disease, chronic, due to alcohol ([facility] [address]  -     COMPREHENSIVE METABOLIC PANEL; Future  4. Special screening for malignant neoplasm of prostate  -     PSA, Prostatic Specific Antigen; Future  5. Essential hypertension  -     COMPREHENSIVE METABOLIC PANEL; Future  6. Encounter for well adult exam without abnormal findings         Personalized Preventive Plan   Current Health Maintenance Status  Immunization History   Administered Date(s) Administered    COVID-19, Pfizer, PF, 30mcg/0.3mL 03/17/2021, 04/07/2021    Hepatitis A/Hepatitis B (Twinrix) 10/09/2018, 09/20/2019    Hepatitis B Adult (Engerix-B) 11/29/2018    Influenza Virus Vaccine 09/30/2020    Influenza, Intradermal, Quadrivalent, Preservative Free 10/18/2016    Influenza, Quadv, IM, PF (6 mo and older Fluzone, Flulaval, Fluarix, and 3 yrs and older Afluria) 10/17/2018    Influenza, Quadv, Recombinant, IM PF (Flublok 18 yrs and older) 09/25/2019    Pneumococcal Conjugate 13-valent ([id number]) 06/12/2019    Pneumococcal Polysaccharide (Ligotvwcl19) 12/14/2020    SARS-COV-2 (COVID-19) Vaccine, Unspecified 08/15/2021    Tdap (Boostrix, Adacel) 12/10/2018    Zoster Recombinant (Shingrix) 12/04/2020, 03/02/2021        Health Maintenance   Topic Date Due    COVID-19 Vaccine (4 - Booster) 02/15/2022    Lipid screen  12/13/2022    Pneumococcal 0-64 years Vaccine (3 of 4 - PPSV23) 12/14/2025    DTaP/Tdap/Td vaccine (2 - Td or Tdap) 12/10/2028    Colon cancer screen colonoscopy  04/21/2031    Flu vaccine  Completed    Shingles Vaccine  Completed    Hepatitis C screen  Completed    HIV screen  Completed    Hepatitis A vaccine  Aged Out    Hepatitis B vaccine  Aged Out    Hib vaccine  Aged Out    Meningococcal (ACWY) vaccine  Aged Out     Recommendations for 2nd Watch Due: see orders and patient instructions/AVS.    Return in about 6 months (around 6/16/2022).

## 2022-01-06 ENCOUNTER — HOSPITAL ENCOUNTER (OUTPATIENT)
Dept: ONCOLOGY | Age: 62
Setting detail: INFUSION SERIES
Discharge: HOME | End: 2022-01-06
Payer: COMMERCIAL

## 2022-01-06 VITALS
SYSTOLIC BLOOD PRESSURE: 117 MMHG | TEMPERATURE: 97.8 F | DIASTOLIC BLOOD PRESSURE: 96 MMHG | HEART RATE: 70 BPM | OXYGEN SATURATION: 100 % | RESPIRATION RATE: 18 BRPM

## 2022-01-06 DIAGNOSIS — M81.0 AGE-RELATED OSTEOPOROSIS WITHOUT CURRENT PATHOLOGICAL FRACTURE: Primary | ICD-10-CM

## 2022-01-06 LAB
ANION GAP SERPL CALCULATED.3IONS-SCNC: 9 MMOL/L (ref 3–16)
BUN BLDV-MCNC: 12 MG/DL (ref 7–20)
CALCIUM SERPL-MCNC: 8.7 MG/DL (ref 8.3–10.6)
CHLORIDE BLD-SCNC: 105 MMOL/L (ref 99–110)
CO2: 24 MMOL/L (ref 21–32)
CREAT SERPL-MCNC: 1.2 MG/DL (ref 0.8–1.3)
GFR AFRICAN AMERICAN: >60
GFR NON-AFRICAN AMERICAN: >60
GLUCOSE BLD-MCNC: 112 MG/DL (ref 70–99)
POTASSIUM SERPL-SCNC: 4.2 MMOL/L (ref 3.5–5.1)
SODIUM BLD-SCNC: 138 MMOL/L (ref 136–145)

## 2022-01-06 PROCEDURE — 2580000003 HC RX 258: Performed by: INTERNAL MEDICINE

## 2022-01-06 PROCEDURE — 96360 HYDRATION IV INFUSION INIT: CPT

## 2022-01-06 PROCEDURE — 80048 BASIC METABOLIC PNL TOTAL CA: CPT

## 2022-01-06 PROCEDURE — 96365 THER/PROPH/DIAG IV INF INIT: CPT

## 2022-01-06 PROCEDURE — 6360000002 HC RX W HCPCS: Performed by: INTERNAL MEDICINE

## 2022-01-06 RX ORDER — SODIUM CHLORIDE 0.9 % (FLUSH) 0.9 %
5-40 SYRINGE (ML) INJECTION PRN
OUTPATIENT
Start: 2022-01-06

## 2022-01-06 RX ORDER — 0.9 % SODIUM CHLORIDE 0.9 %
250 INTRAVENOUS SOLUTION INTRAVENOUS ONCE
Status: COMPLETED | OUTPATIENT
Start: 2022-01-06 | End: 2022-01-06

## 2022-01-06 RX ORDER — 0.9 % SODIUM CHLORIDE 0.9 %
250 INTRAVENOUS SOLUTION INTRAVENOUS ONCE
Status: CANCELLED | OUTPATIENT
Start: 2022-01-06 | End: 2022-01-06

## 2022-01-06 RX ORDER — ZOLEDRONIC ACID 5 MG/100ML
5 INJECTION, SOLUTION INTRAVENOUS ONCE
Status: COMPLETED | OUTPATIENT
Start: 2022-01-06 | End: 2022-01-06

## 2022-01-06 RX ORDER — ALBUTEROL SULFATE 90 UG/1
4 AEROSOL, METERED RESPIRATORY (INHALATION) PRN
OUTPATIENT
Start: 2022-01-06

## 2022-01-06 RX ORDER — SODIUM CHLORIDE 9 MG/ML
INJECTION, SOLUTION INTRAVENOUS ONCE
Status: DISCONTINUED | OUTPATIENT
Start: 2022-01-06 | End: 2022-01-07 | Stop reason: HOSPADM

## 2022-01-06 RX ORDER — SODIUM CHLORIDE 9 MG/ML
INJECTION, SOLUTION INTRAVENOUS ONCE
Status: CANCELLED | OUTPATIENT
Start: 2022-01-06 | End: 2022-01-06

## 2022-01-06 RX ORDER — SODIUM CHLORIDE 9 MG/ML
25 INJECTION, SOLUTION INTRAVENOUS PRN
OUTPATIENT
Start: 2022-01-06

## 2022-01-06 RX ORDER — ZOLEDRONIC ACID 5 MG/100ML
5 INJECTION, SOLUTION INTRAVENOUS ONCE
Status: CANCELLED | OUTPATIENT
Start: 2022-01-06 | End: 2022-01-06

## 2022-01-06 RX ORDER — SODIUM CHLORIDE 9 MG/ML
INJECTION, SOLUTION INTRAVENOUS CONTINUOUS
OUTPATIENT
Start: 2022-01-06

## 2022-01-06 RX ORDER — ONDANSETRON 2 MG/ML
8 INJECTION INTRAMUSCULAR; INTRAVENOUS
OUTPATIENT
Start: 2022-01-06

## 2022-01-06 RX ORDER — ACETAMINOPHEN 325 MG/1
650 TABLET ORAL
OUTPATIENT
Start: 2022-01-06

## 2022-01-06 RX ORDER — DIPHENHYDRAMINE HYDROCHLORIDE 50 MG/ML
50 INJECTION INTRAMUSCULAR; INTRAVENOUS
OUTPATIENT
Start: 2022-01-06

## 2022-01-06 RX ORDER — HEPARIN SODIUM (PORCINE) LOCK FLUSH IV SOLN 100 UNIT/ML 100 UNIT/ML
500 SOLUTION INTRAVENOUS PRN
OUTPATIENT
Start: 2022-01-06

## 2022-01-06 RX ADMIN — ZOLEDRONIC ACID 5 MG: 0.05 INJECTION, SOLUTION INTRAVENOUS at 13:05

## 2022-01-06 RX ADMIN — SODIUM CHLORIDE 250 ML: 9 INJECTION, SOLUTION INTRAVENOUS at 13:06

## 2022-01-06 NOTE — PROGRESS NOTES
Pt seen and assessed at [facility] for reclast infusion per orders from Dr. Alecia Dave. Infused per [place] [id number] policy. Monitoring completed for infusion reactions - see flowsheet. Pt tolerated infusion well and without incident. Pt verbalizes understanding of discharge instructions. Discharged ambulatory to home with self.

## 2022-01-18 ENCOUNTER — CARE COORDINATION (OUTPATIENT)
Dept: CARE COORDINATION | Age: 62
End: 2022-01-18

## 2022-01-18 NOTE — CARE COORDINATION
Ambulatory Care Coordination Note  1/18/2022  CM Risk Score: 4  Charlson 10 Year Mortality Risk Score: 98%     ACC: Sarai Azar, MARYSE [facility]    Hx: HTN, Liver Transplant d/t Cirrhosis of the Liver, Lesion of Lung, Immunocompromised, CKD, Neuropathy, Elevated MCV, Hyperlipidemia    Summary Note: The pt stated he saw his Transplant Specialist in November 2021 and stated he had an HIV test that was recommended and it was negative. The pt stated he saw his new PCP for his Physical and the physician was pleased with his lab results. The pt stated the Lipitor brought his cholesterol down nicely. The pt stated he had his yearly ReClast Infusion on 1/6/22. The pt stated he will see the endocrinologist in a year and will have a DEXA Scan in a year. The pt stated he has been doing well except for some dental issues but he is seeing his dentist.  Reviewed the pt's current medications. The pt denied any current needs at this time. Plan:  Continue on all current medications. Pt has a  Dermatology appointment on 2/3/22. The pt has a scheduled appointment with his nephrologist on 2/22/22. Care Coordination Interventions    Program Enrollment: Complex Care  Referral from Primary Care Provider: No  Suggested Interventions and Community Resources  Specialty Services Referral:  (Comment: Nephrology and Endocrinology)  Zone Management Tools: In Process  Other Services or Interventions: Liver Transplant Team at [facility]. Had ReClast Infusion         Goals Addressed                 This Visit's Progress     Conditions and Symptoms   On track     I will schedule office visits, as directed by my provider. I will notify my provider of any symptoms that indicate a worsening of my condition. Barriers: lack of education  Plan for overcoming my barriers: Work with the RNEDITH  Confidence: 8/10  Anticipated Goal Completion Date: 2/11/21              Prior to Admission medications    Medication Sig Start Date End Date Taking? Authorizing Provider   atorvastatin (LIPITOR) 40 MG tablet Take 1 tablet by mouth daily 11/18/21   Raymond Moe MD   pantoprazole (PROTONIX) 40 MG tablet Take 1 tablet (40 mg total) by mouth every morning before breakfast. 11/12/21   Ilda Brewster MD   pantoprazole (PROTONIX) 40 MG tablet Take 1 tablet by mouth every morning before breakfast 11/8/21   Ilda Brewster MD   carvedilol (COREG) 12.5 MG tablet TAKE ONE TABLET BY MOUTH TWICE A DAY WITH MEALS 10/1/21   Beni Menon MD   diphenhydrAMINE (BENADRYL) 50 MG tablet Take 50 mg by mouth nightly as needed for Itching    Historical Provider, MD   acetaminophen (TYLENOL) 500 MG tablet Take 325 mg by mouth every 6 hours as needed    Historical Provider, MD   tacrolimus (PROGRAF) 1 MG capsule Take 1 mg by mouth 2 times daily    Historical Provider, MD   mycophenolate (CELLCEPT) 500 MG tablet Take 500 mg by mouth 2 times daily    Historical Provider, MD   Multiple Vitamins-Minerals (THERAPEUTIC MULTIVITAMIN-MINERALS) tablet Take 1 tablet by mouth daily    Historical Provider, MD       Future Appointments   Date Time Provider Bren Pool   3/1/2022 12:00 PM Beni Menon MD [facility] Ne[facility]   6/16/2022 12:45 PM Raymond Moe MD [facility] [place] - [facility]      and   General Assessment    Do you have any symptoms that are causing concern?: No

## 2022-02-18 ENCOUNTER — CARE COORDINATION (OUTPATIENT)
Dept: CARE COORDINATION | Age: 62
End: 2022-02-18

## 2022-02-18 NOTE — CARE COORDINATION
Ambulatory Care Coordination Note  2/18/2022  CM Risk Score: 4  Charlson 10 Year Mortality Risk Score: 98%     ACC: Joselo Foreman, RN CCM    Hx: HTN, Liver Transplant d/t Cirrhosis of the Liver, Lesion of Lung, Immunocompromised, CKD, Neuropathy, Elevated MCV, Hyperlipidemia    Summary Note: The pt is up to date with his appointments and Physical. The pt stated he is having blood work drawn next week and has a nephrology appointment on 3/1/22. The pt stated he is feeling well and denied any current needs. Plan:  Follow up with nephrology on 3/1/22. The pt stated he is scheduled to see his Liver Specialist in May, 2022. The pt has a scheduled follow up appointment with the PCP on 6/16/22. Continue on all current medications. The RN, ACM will remind the pt to get his COVID-19 booster. Care Coordination Interventions    Program Enrollment: Complex Care  Referral from Primary Care Provider: No  Suggested Interventions and Community Resources  Specialty Services Referral:  (Comment: Nephrology and Endocrinology)  Zone Management Tools: In Process  Other Services or Interventions: Liver Transplant Team at [facility]. Had ReClast Infusion         Goals Addressed                 This Visit's Progress     Conditions and Symptoms   On track     I will schedule office visits, as directed by my provider. I will notify my provider of any symptoms that indicate a worsening of my condition. Barriers: lack of education  Plan for overcoming my barriers: Work with the RN, ACM  Confidence: 8/10  Anticipated Goal Completion Date: 2/11/21              Prior to Admission medications    Medication Sig Start Date End Date Taking?  Authorizing Provider   atorvastatin (LIPITOR) 40 MG tablet Take 1 tablet by mouth daily 11/18/21   Bolivar Grimes MD   pantoprazole (PROTONIX) 40 MG tablet Take 1 tablet (40 mg total) by mouth every morning before breakfast. 11/12/21   Suma Sharma MD   pantoprazole (PROTONIX) 40 MG tablet Take 1

## 2022-02-22 DIAGNOSIS — N18.2 CKD (CHRONIC KIDNEY DISEASE), STAGE II: ICD-10-CM

## 2022-02-22 LAB
CREATININE URINE: 413.5 MG/DL (ref 39–259)
MICROALBUMIN UR-MCNC: <1.2 MG/DL
MICROALBUMIN/CREAT UR-RTO: ABNORMAL MG/G (ref 0–30)

## 2022-03-16 RX ORDER — PANTOPRAZOLE SODIUM 40 MG/1
TABLET, DELAYED RELEASE ORAL
Qty: 30 TABLET | Refills: 3 | Status: SHIPPED | OUTPATIENT
Start: 2022-03-16 | End: 2022-06-23 | Stop reason: SDUPTHER

## 2022-03-18 ENCOUNTER — CARE COORDINATION (OUTPATIENT)
Dept: CARE COORDINATION | Age: 62
End: 2022-03-18

## 2022-03-18 NOTE — CARE COORDINATION
Ambulatory Care Coordination Note  3/18/2022  CM Risk Score: 4  Charlson 10 Year Mortality Risk Score: 98%     ACC: Juan Anguiano, RN [facility]    Hx: HTN, Liver Transplant d/t Cirrhosis of the Liver, Lesion of Lung, Immunocompromised, CKD, Neuropathy, Elevated MCV, Hyperlipidemia    Summary Note: The pt stated he is feeling well and denied any current needs. The pt stated his kidney function test was a little low but the nephrologist was not concerned. The pt stated he is scheduled to follow up with the nephrologist in July and will have repeat lab work. The pt has all of his appointments scheduled with his PCP and Specialists. The pt stated his endocrinologist is no longer in network and he usually sees him for a DEXA scan and his yearly Reclast. The RN, ACM suggested the pt ask his PCP if he would order these for the pt. The RN, ACM discussed graduation from Care Coordination at this time. The RN, ACM reassured the pt that he could call the nurse if any new health issues arise. The pt agreed to graduation. The pt did ask the RN, ACM to check to see if the PCP recommends the pt get another COVID booster. The pt had his third vaccine on 8/21/22. Plan:  The RN, ACM will check on the 2nd booster for the pt. The pt has a scheduled appointment with his Liver Specialist on 5/19/22. The pt has a scheduled appointment with the PCP on 6/16/22. The pt has a scheduled appointment with his nephrologist on 7/7/22  The RN, ACM will sign off. Care Coordination Interventions    Program Enrollment: Complex Care  Referral from Primary Care Provider: No  Suggested Interventions and Community Resources  Specialty Services Referral:  (Comment: Nephrology and Endocrinology)  Zone Management Tools: Completed  Other Services or Interventions: Liver Transplant Team at [facility].  Had [address] followed by nephrology         Goals Addressed                 This Visit's Progress     COMPLETED: Conditions and Symptoms On track     I will schedule office visits, as directed by my provider. I will notify my provider of any symptoms that indicate a worsening of my condition. Barriers: lack of education  Plan for overcoming my barriers: Work with the RNEDITH  Confidence: 8/10  Anticipated Goal Completion Date: 2/11/21              Prior to Admission medications    Medication Sig Start Date End Date Taking?  Authorizing Provider   pantoprazole (PROTONIX) 40 MG tablet Take 1 tablet (40 mg total) by mouth every morning before breakfast. 3/16/22   Mady Hayes MD   carvedilol (COREG) 12.5 MG tablet TAKE ONE TABLET BY MOUTH TWICE A DAY WITH MEALS 3/7/22   Genie Love MD   carvedilol (COREG) 12.5 MG tablet Take 1 tablet by mouth 2 times daily (with meals) 3/1/22   Genie Love MD   atorvastatin (LIPITOR) 40 MG tablet Take 1 tablet by mouth daily 11/18/21   Mady Hayes MD   pantoprazole (PROTONIX) 40 MG tablet Take 1 tablet (40 mg total) by mouth every morning before breakfast. 11/12/21   Warden Tyler MD   diphenhydrAMINE (BENADRYL) 50 MG tablet Take 50 mg by mouth nightly as needed for Itching    Historical Provider, MD   acetaminophen (TYLENOL) 500 MG tablet Take 325 mg by mouth every 6 hours as needed    Historical Provider, MD   tacrolimus (PROGRAF) 1 MG capsule Take 1 mg by mouth 2 times daily    Historical Provider, MD   mycophenolate (CELLCEPT) 500 MG tablet Take 500 mg by mouth 2 times daily    Historical Provider, MD   Multiple Vitamins-Minerals (THERAPEUTIC MULTIVITAMIN-MINERALS) tablet Take 1 tablet by mouth daily    Historical Provider, MD       Future Appointments   Date Time Provider Bren Pool   6/16/2022 12:45 PM MD [facility] [place] - [facility]   7/7/2022 12:15 PM Genie Love MD [facility]      and   General Assessment    Do you have any symptoms that are causing concern?: No

## 2022-05-05 ENCOUNTER — PATIENT MESSAGE (OUTPATIENT)
Dept: CARE COORDINATION | Age: 62
End: 2022-05-05

## 2022-05-06 ENCOUNTER — CARE COORDINATION (OUTPATIENT)
Dept: CARE COORDINATION | Age: 62
End: 2022-05-06

## 2022-05-06 DIAGNOSIS — E78.00 PURE HYPERCHOLESTEROLEMIA: ICD-10-CM

## 2022-05-06 NOTE — CARE COORDINATION
The pt left a voice message asking for a refill for his Atorvastatin be sent to his pharmacy.  The RN, EDITH will forward this on the PCP's MA.

## 2022-05-10 DIAGNOSIS — E78.00 PURE HYPERCHOLESTEROLEMIA: ICD-10-CM

## 2022-05-10 RX ORDER — ATORVASTATIN CALCIUM 40 MG/1
40 TABLET, FILM COATED ORAL DAILY
Qty: 90 TABLET | Refills: 1 | Status: SHIPPED | OUTPATIENT
Start: 2022-05-10

## 2022-05-13 DIAGNOSIS — E78.00 PURE HYPERCHOLESTEROLEMIA: ICD-10-CM

## 2022-05-13 RX ORDER — ATORVASTATIN CALCIUM 40 MG/1
TABLET, FILM COATED ORAL
Qty: 90 TABLET | Refills: 1 | OUTPATIENT
Start: 2022-05-13

## 2022-06-20 DIAGNOSIS — Z12.5 SPECIAL SCREENING FOR MALIGNANT NEOPLASM OF PROSTATE: ICD-10-CM

## 2022-06-20 DIAGNOSIS — K70.9 LIVER DISEASE, CHRONIC, DUE TO ALCOHOL (HCC): ICD-10-CM

## 2022-06-20 DIAGNOSIS — E78.2 HYPERLIPIDEMIA, MIXED: ICD-10-CM

## 2022-06-20 DIAGNOSIS — Z00.00 PREVENTATIVE HEALTH CARE: ICD-10-CM

## 2022-06-20 DIAGNOSIS — I10 ESSENTIAL HYPERTENSION: ICD-10-CM

## 2022-06-20 LAB
A/G RATIO: 1.4 (ref 1.1–2.2)
ALBUMIN SERPL-MCNC: 4.4 G/DL (ref 3.4–5)
ALP BLD-CCNC: 81 U/L (ref 40–129)
ALT SERPL-CCNC: 23 U/L (ref 10–40)
ANION GAP SERPL CALCULATED.3IONS-SCNC: 15 MMOL/L (ref 3–16)
AST SERPL-CCNC: 33 U/L (ref 15–37)
BILIRUB SERPL-MCNC: 0.9 MG/DL (ref 0–1)
BUN BLDV-MCNC: 14 MG/DL (ref 7–20)
CALCIUM SERPL-MCNC: 9.5 MG/DL (ref 8.3–10.6)
CHLORIDE BLD-SCNC: 104 MMOL/L (ref 99–110)
CHOLESTEROL, TOTAL: 176 MG/DL (ref 0–199)
CO2: 21 MMOL/L (ref 21–32)
CREAT SERPL-MCNC: 1.4 MG/DL (ref 0.8–1.3)
GFR AFRICAN AMERICAN: >60
GFR NON-AFRICAN AMERICAN: 51
GLUCOSE BLD-MCNC: 108 MG/DL (ref 70–99)
HDLC SERPL-MCNC: 45 MG/DL (ref 40–60)
LDL CHOLESTEROL CALCULATED: 94 MG/DL
POTASSIUM SERPL-SCNC: 4.3 MMOL/L (ref 3.5–5.1)
PROSTATE SPECIFIC ANTIGEN: 0.77 NG/ML (ref 0–4)
SODIUM BLD-SCNC: 140 MMOL/L (ref 136–145)
TOTAL PROTEIN: 7.5 G/DL (ref 6.4–8.2)
TRIGL SERPL-MCNC: 186 MG/DL (ref 0–150)
TSH REFLEX: 1.42 UIU/ML (ref 0.27–4.2)
VLDLC SERPL CALC-MCNC: 37 MG/DL

## 2022-06-23 ENCOUNTER — OFFICE VISIT (OUTPATIENT)
Dept: INTERNAL MEDICINE CLINIC | Age: 62
End: 2022-06-23
Payer: COMMERCIAL

## 2022-06-23 VITALS
BODY MASS INDEX: 28.36 KG/M2 | HEIGHT: 73 IN | WEIGHT: 214 LBS | OXYGEN SATURATION: 99 % | DIASTOLIC BLOOD PRESSURE: 76 MMHG | SYSTOLIC BLOOD PRESSURE: 124 MMHG | HEART RATE: 75 BPM

## 2022-06-23 DIAGNOSIS — Z00.00 PREVENTATIVE HEALTH CARE: ICD-10-CM

## 2022-06-23 DIAGNOSIS — I10 ESSENTIAL HYPERTENSION: ICD-10-CM

## 2022-06-23 DIAGNOSIS — M81.0 AGE-RELATED OSTEOPOROSIS WITHOUT CURRENT PATHOLOGICAL FRACTURE: Primary | ICD-10-CM

## 2022-06-23 DIAGNOSIS — E78.2 HYPERLIPIDEMIA, MIXED: ICD-10-CM

## 2022-06-23 DIAGNOSIS — K70.9 LIVER DISEASE, CHRONIC, DUE TO ALCOHOL (HCC): ICD-10-CM

## 2022-06-23 PROCEDURE — 1036F TOBACCO NON-USER: CPT | Performed by: HOSPITALIST

## 2022-06-23 PROCEDURE — G8427 DOCREV CUR MEDS BY ELIG CLIN: HCPCS | Performed by: HOSPITALIST

## 2022-06-23 PROCEDURE — 3017F COLORECTAL CA SCREEN DOC REV: CPT | Performed by: HOSPITALIST

## 2022-06-23 PROCEDURE — 99214 OFFICE O/P EST MOD 30 MIN: CPT | Performed by: HOSPITALIST

## 2022-06-23 PROCEDURE — G8419 CALC BMI OUT NRM PARAM NOF/U: HCPCS | Performed by: HOSPITALIST

## 2022-06-23 RX ORDER — CLINDAMYCIN PHOSPHATE 10 MG/G
GEL TOPICAL
COMMUNITY
Start: 2022-05-10

## 2022-06-23 RX ORDER — B-COMPLEX WITH VITAMIN C
1 TABLET ORAL 2 TIMES DAILY WITH MEALS
COMMUNITY
Start: 2022-01-18

## 2022-06-23 RX ORDER — AZELAIC ACID 0.15 G/G
GEL TOPICAL
COMMUNITY
Start: 2022-05-10

## 2022-06-23 RX ORDER — BENZOYL PEROXIDE 50 MG/ML
LIQUID TOPICAL
COMMUNITY
Start: 2022-03-23

## 2022-06-23 ASSESSMENT — ENCOUNTER SYMPTOMS
BACK PAIN: 0
SHORTNESS OF BREATH: 0
ABDOMINAL PAIN: 0
CONSTIPATION: 0
ABDOMINAL DISTENTION: 0
WHEEZING: 0
BLOOD IN STOOL: 0
COUGH: 0
SINUS PAIN: 0
DIARRHEA: 0
SINUS PRESSURE: 0
SORE THROAT: 0
NAUSEA: 0
VOMITING: 0
CHEST TIGHTNESS: 0
TROUBLE SWALLOWING: 0
VOICE CHANGE: 0

## 2022-06-23 ASSESSMENT — PATIENT HEALTH QUESTIONNAIRE - PHQ9
SUM OF ALL RESPONSES TO PHQ QUESTIONS 1-9: 0
SUM OF ALL RESPONSES TO PHQ QUESTIONS 1-9: 0
SUM OF ALL RESPONSES TO PHQ9 QUESTIONS 1 & 2: 0
2. FEELING DOWN, DEPRESSED OR HOPELESS: 0
SUM OF ALL RESPONSES TO PHQ QUESTIONS 1-9: 0
1. LITTLE INTEREST OR PLEASURE IN DOING THINGS: 0
SUM OF ALL RESPONSES TO PHQ QUESTIONS 1-9: 0

## 2022-06-23 NOTE — PATIENT INSTRUCTIONS
Patient Education        Learning About High Triglycerides  What are high triglycerides? Triglycerides are a type of fat in your blood. Your body uses them for energy. You need some for good health. But high triglyceride levels are linked with a higher risk of coronary artery disease. A high level may be a sign of metabolicsyndrome. Very high levels raise your risk of pancreatitis. What causes them? High triglycerides can run in families. They may also be caused by other conditions, like obesity and diabetes. You may have high levels of this fat if you eat or drink too many foods or drinks with added sugar or if you drink alot of alcohol. And some medicines can cause this condition. What are their symptoms? High triglycerides usually don't cause symptoms. But if the condition isgenetic, you may see fatty bumps under your skin. How are they diagnosed? A blood test is used to measure triglycerides. It's most accurate if it's doneafter you go without food or drink for 9 to 14 hours (fasting). Triglyceride levels are:   Normal when they are less than 150 mg/dL.  Moderately high when they are 150 to 499 mg/dL.  Very high when they are 500 mg/dL or higher. How are they treated? A healthy lifestyle can help lower your triglycerides and your risk of coronary artery disease. It includes losing weight, being active, limiting high-sugar foods and drinks, and limiting alcohol. Your doctor may recommend that you also take medicine. Your doctor will treat other health problems if they are causinghigh levels. How do you care for yourself when you have high triglycerides? A healthy diet and lifestyle can help lower your triglycerides level and loweryour risk of coronary artery disease.  Lose weight, and stay at a healthy weight. Triglycerides are stored as fat in your tissues and muscles.  Limit foods and drinks that have a lot of sugar.    These include sugar-sweetened desserts, soda pop, and fruit juice.   Limit saturated fats. These are found in animal-based foods like meat, butter, milk, and cheese. Theyare also found in coconut oil, palm oil, and cocoa butter.  Choose a heart-healthy eating plan. Eat a diet that's rich in vegetables, whole grains, fish, lean meats, andlow-fat or nonfat dairy foods. Eating oily fish may lower your levels. These fish include salmon, mackerel,lake trout, herring, and sardines.  Limit or avoid alcohol. Limit alcohol to 2 drinks a day for men and 1 drink a day for women. Alcoholhas a strong effect on triglycerides.  Be active on most days of the week. Before you start to be more active, check with your doctor to be sure it's safe. Try to do moderate activity at least 2½ hours a week. Or try to do vigorousactivity at least 1¼ hours a week.  If you have diabetes, keep your blood sugar in your target range.  Don't smoke. If you need help quitting, talk to your doctor about stop-smoking programs andmedicines. These can increase your chances of quitting for good.  Take your medicines exactly as prescribed. Call your doctor if you think you are having a problem with your medicine. Where can you learn more? Go to https://TheFriendMail.AdVolume. org and sign in to your [employer] account. Enter T123 in the [facility] box to learn more about \"Learning About High Triglycerides. \"     If you do not have an account, please click on the \"Sign Up Now\" link. Current as of: January 10, 2022               Content Version: 13.3  © 2006-2022 Healthwise, Incorporated. Care instructions adapted under license by [facility] ([facility]). If you have questions about a medical condition or this instruction, always ask your healthcare professional. [place] [address] any warranty or liability for your use of this information.

## 2022-06-23 NOTE — PROGRESS NOTES
[facility]  Follow-up visit   2022    Matilde Batista (:  1960) is a 57 y.o. male, here for follow-up:    Chief Complaint   Patient presents with    Hyperlipidemia     follow up         HPI    -Exercises 3 x weekly at the gym.  -No current smoking.       Liver disease, chronic, due to alcohol ([address]  S/P transplant 2019 at [facility]. On Tacrolimus and Cellcept. Follows with Dr. Mariluz Nichole at [facility]. Had HRS. Follows with Dr. Arthur Camargo. Last Cr=1.4 with GFR>51 2022.     Hyperlipidemia  Well controlled on Atorvastatin 40 by the 2022  labs.       Hypertension  BP controlled on carvedilol     Former cigarette smoker  Denies smoking    Osteoporosis:    Was seeing Dr. Desiree Oviedo. He no longer wants to see. Referral to Dr. Venkat Cervantes. ROS  Review of Systems   Constitutional: Negative for activity change, appetite change, chills, diaphoresis, fatigue, fever and unexpected weight change. HENT: Negative for sinus pressure, sinus pain, sore throat, trouble swallowing and voice change. Eyes: Negative for visual disturbance. Respiratory: Negative for cough, chest tightness, shortness of breath and wheezing. Cardiovascular: Negative for chest pain, palpitations and leg swelling. Gastrointestinal: Negative for abdominal distention, abdominal pain, blood in stool, constipation, diarrhea, nausea and vomiting. Endocrine: Negative for polydipsia and polyphagia. Genitourinary: Negative for decreased urine volume, difficulty urinating, dysuria and urgency. Musculoskeletal: Positive for arthralgias (B Shoulder pain). Negative for back pain, gait problem, joint swelling and myalgias. Neurological: Negative for dizziness, seizures, syncope, light-headedness and headaches. Psychiatric/Behavioral: Negative for agitation, behavioral problems, confusion and suicidal ideas.        HISTORIES  Current Outpatient Medications on File Prior to Visit   Medication Sig Dispense Refill    Calcium Carbonate-Vitamin D (OYSTER SHELL CALCIUM/D) 500-200 MG-UNIT TABS Take 1 tablet by mouth 2 times daily (with meals)      clindamycin (CLINDAGEL) 1 % gel       BENZOYL PEROXIDE 5 % external wash       Azelaic Acid 15 % GEL       atorvastatin (LIPITOR) 40 MG tablet Take 1 tablet by mouth daily 90 tablet 1    carvedilol (COREG) 12.5 MG tablet TAKE ONE TABLET BY MOUTH TWICE A DAY WITH MEALS 60 tablet 4    pantoprazole (PROTONIX) 40 MG tablet Take 1 tablet (40 mg total) by mouth every morning before breakfast. 30 tablet 3    diphenhydrAMINE (BENADRYL) 50 MG tablet Take 50 mg by mouth nightly as needed for Itching      acetaminophen (TYLENOL) 500 MG tablet Take 325 mg by mouth every 6 hours as needed      tacrolimus (PROGRAF) 1 MG capsule Take 1 mg by mouth 2 times daily      mycophenolate (CELLCEPT) 500 MG tablet Take 500 mg by mouth 2 times daily      Multiple Vitamins-Minerals (THERAPEUTIC MULTIVITAMIN-MINERALS) tablet Take 1 tablet by mouth daily       No current facility-administered medications on file prior to visit.       No Known Allergies  Past Medical History:   Diagnosis Date    [facility]     Hyperlipidemia     Hypertension      Patient Active Problem List   Diagnosis    Hypertension    Hyperlipidemia    Former cigarette smoker    History of alcohol abuse    Insomnia disorder    Elevated MCV    Liver disease, chronic, due to alcohol ([facility]    Age-related osteoporosis without current pathological fracture     Past Surgical History:   Procedure Laterality Date    KNEE SURGERY Right 1969    fall; glass injury - laceration    GA ESOPHAGOGASTRODUODENOSCOPY TRANSORAL DIAGNOSTIC N/A 7/18/2018    EGD ESOPHAGOGASTRODUODENOSCOPY WITH ESOPHAGEAL VARICES BANDING X 2 WITH MAC ANESTHESIA performed by Daniel Núñez MD at [facility]. [address] N/A 8/16/2018    EGD BAND LIGATION OF ESOPHAGEAL VARICES X3 WITH MAC performed by Daniel Núñez MD at [address] History     Socioeconomic History    Marital status: Single     Spouse name: Not on file    Number of children: Not on file    Years of education: Not on file    Highest education level: Not on file   Occupational History    Not on file   Tobacco Use    Smoking status: Former Smoker     Packs/day: 0.50     Years: 38.00     Pack years: 19.00     Types: Cigarettes     Start date: 1979     Quit date: 2017     Years since quittin.1    Smokeless tobacco: Never Used    Tobacco comment: has not smoked in 6mo   Vaping Use    Vaping Use: Never used   Substance and Sexual Activity    Alcohol use: Yes     Alcohol/week: 0.0 standard drinks     Comment: 2 per day- not for 5months    Drug use: No    Sexual activity: Not on file   Other Topics Concern    Not on file   Social History Narrative    Not on file     Social Determinants of Health     Financial Resource Strain: Low Risk     Difficulty of Paying Living Expenses: Not hard at all   Food Insecurity: No Food Insecurity    Worried About [address] in the Last Year: Never true    [address]  in the Last Year: Never true   Transportation Needs:     Lack of Transportation (Medical): Not on file    Lack of Transportation (Non-Medical):  Not on file   Physical Activity:     Days of Exercise per Week: Not on file    Minutes of Exercise per Session: Not on file   Stress:     Feeling of Stress : Not on file   Social Connections:     Frequency of Communication with Friends and Family: Not on file    Frequency of Social Gatherings with Friends and Family: Not on file    Attends Hindu Services: Not on file    Active Member of Clubs or Organizations: Not on file    Attends Club or Organization Meetings: Not on file    Marital Status: Not on file   Intimate Partner Violence:     Fear of Current or Ex-Partner: Not on file    Emotionally Abused: Not on file    Physically Abused: Not on file   [address] [place] [address] Sexually Abused: Not on file   Housing Stability:     Unable to Pay for Housing in the Last Year: Not on file    Number of Places Lived in the Last Year: Not on file    Unstable Housing in the Last Year: Not on file      Family History   Problem Relation Age of Onset    Diabetes Mother     Heart Failure Mother        PE  Vitals:    06/23/22 1350   BP: 124/76   Pulse: 75   SpO2: 99%   Weight: 214 lb (97.1 kg)   Height: 6' 1\" (1.854 m)     Estimated body mass index is 28.23 kg/m² as calculated from the following:    Height as of this encounter: 6' 1\" (1.854 m). Weight as of this encounter: 214 lb (97.1 kg). Physical Exam  Vitals reviewed. Constitutional:       General: He is not in acute distress. Appearance: Normal appearance. HENT:      Head: Normocephalic and atraumatic. Mouth/Throat:      Pharynx: Oropharynx is clear. Eyes:      Conjunctiva/sclera: Conjunctivae normal.      Pupils: Pupils are equal, round, and reactive to light. Cardiovascular:      Rate and Rhythm: Normal rate and regular rhythm. Pulses: Normal pulses. Heart sounds: Normal heart sounds. Pulmonary:      Effort: Pulmonary effort is normal. No respiratory distress. Breath sounds: Normal breath sounds. No wheezing or rales. Abdominal:      Palpations: Abdomen is soft. Tenderness: There is no abdominal tenderness. There is no rebound. Musculoskeletal:         General: No signs of injury. Normal range of motion. Cervical back: Normal range of motion and neck supple. Skin:     General: Skin is warm and dry. Coloration: Skin is not jaundiced. Neurological:      General: No focal deficit present. Mental Status: He is alert and oriented to person, place, and time. Psychiatric:         Behavior: Behavior normal.         Thought Content:  Thought content normal.         Judgment: Judgment normal.         ASSESSMENT/ PLAN:  1. Age-related osteoporosis without current pathological fracture  - Brenda Reveles MD, Endocrinology, [facility]    2. Liver disease, chronic, due to alcohol ([facility] [address]  -Stable. Follows with Dr. Chapis Dupree. -CMP of 6/20    3. Hyperlipidemia, mixed  - Continue Atorvastatin 40   - Comprehensive Metabolic Panel; Future  - Lipid, Fasting; Future    4. Essential hypertension  -  Well controlled on Carvedilol.    - Comprehensive Metabolic Panel; Future    5. Preventative health care  - PSA, Prostatic Specific Antigen; Future       Orders Placed This Encounter   Procedures    Comprehensive Metabolic Panel    Lipid, Fasting    PSA, Prostatic Specific Antigen   Brenda Reveles MD, Endocrinology, [facility]      No orders of the defined types were placed in this encounter. Medications Discontinued During This Encounter   Medication Reason    carvedilol (COREG) 12.3 MG tablet DUPLICATE    pantoprazole (PROTONIX) 40 MG tablet DUPLICATE        Return in about 6 months (around 12/23/2022). Monique Arndt MD    This dictation was generated by voice recognition computer software. Although all attempts are made to edit the dictation for accuracy, there may be errors in the transcription that are not intended.

## 2022-06-24 ENCOUNTER — CARE COORDINATION (OUTPATIENT)
Dept: CARE COORDINATION | Age: 62
End: 2022-06-24

## 2022-06-24 NOTE — CARE COORDINATION
Please update the pt's immunization record. Pt received the Moderna booster shot ([id number]) at [facility] ([phone]) [id number] on 6/23/22.

## 2022-07-05 DIAGNOSIS — N18.30 STAGE 3 CHRONIC KIDNEY DISEASE, UNSPECIFIED WHETHER STAGE 3A OR 3B CKD (HCC): ICD-10-CM

## 2022-07-05 DIAGNOSIS — N18.2 CKD (CHRONIC KIDNEY DISEASE), STAGE II: ICD-10-CM

## 2022-07-05 LAB
ALBUMIN SERPL-MCNC: 4.6 G/DL (ref 3.4–5)
ANION GAP SERPL CALCULATED.3IONS-SCNC: 15 MMOL/L (ref 3–16)
BUN BLDV-MCNC: 13 MG/DL (ref 7–20)
CALCIUM SERPL-MCNC: 9.3 MG/DL (ref 8.3–10.6)
CHLORIDE BLD-SCNC: 103 MMOL/L (ref 99–110)
CO2: 20 MMOL/L (ref 21–32)
CREAT SERPL-MCNC: 1.3 MG/DL (ref 0.8–1.3)
CREATININE URINE: 293.4 MG/DL (ref 39–259)
GFR AFRICAN AMERICAN: >60
GFR NON-AFRICAN AMERICAN: 56
GLUCOSE BLD-MCNC: 92 MG/DL (ref 70–99)
MICROALBUMIN UR-MCNC: <1.2 MG/DL
MICROALBUMIN/CREAT UR-RTO: ABNORMAL MG/G (ref 0–30)
PHOSPHORUS: 3 MG/DL (ref 2.5–4.9)
POTASSIUM SERPL-SCNC: 4.4 MMOL/L (ref 3.5–5.1)
SODIUM BLD-SCNC: 138 MMOL/L (ref 136–145)

## 2022-08-30 ENCOUNTER — TELEPHONE (OUTPATIENT)
Dept: ENDOCRINOLOGY | Age: 62
End: 2022-08-30

## 2022-09-07 ENCOUNTER — TELEPHONE (OUTPATIENT)
Dept: ENDOCRINOLOGY | Age: 62
End: 2022-09-07

## 2022-09-07 NOTE — TELEPHONE ENCOUNTER
New patient, F rec'd. Please schedule and let me know when appt is. Remind them to bring all vitamins and supplements. DXA needed? Yes    Previously seen by Dr. Dona Levin, virtual visit 12/2021. Please find out why he wants to change providers.

## 2022-09-12 NOTE — TELEPHONE ENCOUNTER
L/M for pt to call and schedule an appointment. 3rd attempt. Letter being mailed out to home address on file today.

## 2022-09-15 NOTE — TELEPHONE ENCOUNTER
Patient called back; said that reason for wanting to switch providers is due to an insurance issue. Patient is scheduled for 11/2/22 for a Dexa scan @ 10:00 am and a NP appointment with Dr. Abiola Nevarez @ 10:30 am.      Patient has been advised to bring any vitamins and/or supplements to the appointment.

## 2022-09-16 DIAGNOSIS — M81.0 AGE-RELATED OSTEOPOROSIS WITHOUT CURRENT PATHOLOGICAL FRACTURE: Primary | ICD-10-CM

## 2022-09-16 DIAGNOSIS — M81.0 OSTEOPOROSIS, POSTMENOPAUSAL: Primary | ICD-10-CM

## 2022-10-18 ENCOUNTER — PATIENT MESSAGE (OUTPATIENT)
Dept: ENDOCRINOLOGY | Age: 62
End: 2022-10-18

## 2022-10-19 NOTE — TELEPHONE ENCOUNTER
From: Nella Arias  To: Dr. William Trevino  Sent: 10/18/2022 7:10 PM EDT  Subject: Ning Shelby     Dear Dr. Palmer Aid,    Following up to know whether or not you can accept my insurance. Please note I have an upcoming appointment with Dr. Porfirio Goodman on 11/2, and need to know if I should cancel and reschedule with you?     Thanks and Chelsy Burch

## 2022-11-02 ENCOUNTER — PROCEDURE VISIT (OUTPATIENT)
Dept: ENDOCRINOLOGY | Age: 62
End: 2022-11-02

## 2022-11-02 ENCOUNTER — OFFICE VISIT (OUTPATIENT)
Dept: ENDOCRINOLOGY | Age: 62
End: 2022-11-02
Payer: COMMERCIAL

## 2022-11-02 ENCOUNTER — HOSPITAL ENCOUNTER (OUTPATIENT)
Dept: GENERAL RADIOLOGY | Age: 62
Discharge: HOME | End: 2022-11-02
Payer: COMMERCIAL

## 2022-11-02 VITALS
BODY MASS INDEX: 29.85 KG/M2 | HEIGHT: 71 IN | WEIGHT: 213.2 LBS | HEART RATE: 80 BPM | OXYGEN SATURATION: 100 % | SYSTOLIC BLOOD PRESSURE: 159 MMHG | DIASTOLIC BLOOD PRESSURE: 92 MMHG

## 2022-11-02 DIAGNOSIS — M81.0 AGE-RELATED OSTEOPOROSIS WITHOUT CURRENT PATHOLOGICAL FRACTURE: ICD-10-CM

## 2022-11-02 DIAGNOSIS — M81.0 AGE-RELATED OSTEOPOROSIS WITHOUT CURRENT PATHOLOGICAL FRACTURE: Primary | ICD-10-CM

## 2022-11-02 DIAGNOSIS — Z51.81 MEDICATION MONITORING ENCOUNTER: ICD-10-CM

## 2022-11-02 PROCEDURE — G8419 CALC BMI OUT NRM PARAM NOF/U: HCPCS | Performed by: INTERNAL MEDICINE

## 2022-11-02 PROCEDURE — 3078F DIAST BP <80 MM HG: CPT | Performed by: INTERNAL MEDICINE

## 2022-11-02 PROCEDURE — 1036F TOBACCO NON-USER: CPT | Performed by: INTERNAL MEDICINE

## 2022-11-02 PROCEDURE — 3017F COLORECTAL CA SCREEN DOC REV: CPT | Performed by: INTERNAL MEDICINE

## 2022-11-02 PROCEDURE — 77080 DXA BONE DENSITY AXIAL: CPT | Performed by: INTERNAL MEDICINE

## 2022-11-02 PROCEDURE — G8427 DOCREV CUR MEDS BY ELIG CLIN: HCPCS | Performed by: INTERNAL MEDICINE

## 2022-11-02 PROCEDURE — 77080 DXA BONE DENSITY AXIAL: CPT

## 2022-11-02 PROCEDURE — G8484 FLU IMMUNIZE NO ADMIN: HCPCS | Performed by: INTERNAL MEDICINE

## 2022-11-02 PROCEDURE — 99205 OFFICE O/P NEW HI 60 MIN: CPT | Performed by: INTERNAL MEDICINE

## 2022-11-02 PROCEDURE — 3074F SYST BP LT 130 MM HG: CPT | Performed by: INTERNAL MEDICINE

## 2022-11-02 NOTE — LETTER
[address] [facility] and Osteoporosis  [facility] [address] [facility], [address] [place] [address],[place] [address]  Phone [phone]  Fax [phone]         2022         Adan Green MD                              Re:  Shalonda Gutierrez,  1960    Dear Dr. Clair Freeman: Thank you for asking me to see Shalonda Brenda in consultation. As you know, Mr. Dominique Cyr is a 57 y.o. man with stage 3 CKD, post liver transplant , found to have osteoporosis in 2018, treated with Reclast 2019, 2020 and 2022 with nice improvement in BMD. I recommended skipping the next dose of Reclast for a \"holiday\" of up to 3 years but want to follow him yearly. Enclosed is a copy of my consultation note. Please let me know if you have any questions. Sincerely,    Batlazar Flores. [email or web address]     Encl.  Copy of consult note      CC: Marc Hill MD

## 2022-11-02 NOTE — PROGRESS NOTES
[facility] ([facility]) Osteoporosis and [address] [facility]  [place] [address] [facility], [address] [facility],[place] [address]  Phone [phone]  Fax [phone]    NAME:  Christine Santoyo  :  1960  CONSULT DATE:    10/15/2019 (Dr. Nimo Mohamud). MOST RECENT VISIT:  12/10/2021, [facility] Dr. Kristine Morris DATE:  2022    Labs @ [facility] 2022    CONSULTATION REQUESTED BY: Milagro Hernandez MD  OTHERS WHO NEED REPORTS: Villa Laura MD    PROBLEMS. Osteoporosis by DXA 2018, T-scores -3.0 in the spine, -2.1 in the right femoral neck    Family history of osteoporosis, mother  Decreased kidney function, 2022 Cr 1.5 GFR 47. Liver transplant 0129, alcoholic cirrhosis, still has 2 alcoholic drinks daily    CURRENT MANAGEMENT FOR BONE HEALTH/OSTEOPOROSIS. Calcium, 300 mg from low calcium foods  , 300 mg milk  , 300 mg yogurt  , 300 mg cheese  , 200 mg dk green Quantivo   diet MVI Ca+D other    Calcium   500 BID  mg/d   Vitamin D   200 BID  IU/d     25-OH D 50 ng/mL 10/2020 (desirable is 30-60 ng/mL)  Exercise, stretch, strength, cardio, 3 days @ gym  Pharmacologic therapy: Reclast 2019, 2020, 2022    PREVIOUS BONE-ACTIVE MEDICATIONS. none    OTHER CURRENT MEDICATIONS (SELECTED): Prograf, Cellcept, Lipitor, Coreg  OTC MEDICATIONS (SELECTED): none    CHIEF COMPLAINT. Continue treatment    HISTORY OF PRESENT ILLNESS: See problem list for chronic/inactive conditions. Mr. Emmie Eisenmenger is a 19-year-old man who was found to have osteoporosis by DXA in 10/2018. FOR FULL DETAILS OF FAMILY HISTORY, PAST MEDICAL AND SURGICAL HISTORY, SOCIAL HISTORY, AND REVIEW OF SYSTEMS, SEE PATIENT QUESTIONNAIRE OF TODAY'S DATE. FAMILY HISTORY. Relevant hx in problem list and/or HPI. Otherwise not contributory. PAST MEDICAL HISTORY. Noted in health history form. PAST SURGICAL HISTORY. Noted in health history form. SOCIAL HISTORY. Nonsmoker.   No excessive intake of alcohol, caffeine or sodas. Lives with his mother. REVIEW OF SYSTEMS. Maximum adult height 61. No significant height loss. No recent significant change in weight. PHYSICAL EXAMINATION. GENERAL. Well-nourished, well-developed, normally proportioned adult. MENTAL STATUS. Pleasant mood. Oriented to time, place, and person. ORAL. Teeth appear to be in good condition. SKIN. Normal texture and turgor. LUNGS. Clear to auscultation. Breath sounds normal.  HEART. Heart sounds normal, no murmur or gallop. MUSCULOSKELETAL. The examination included inspection/palpation (any misalignment, asymmetry, crepitation, defects, tenderness, masses, effusions is noted), assessment of range of motion (any presence of pain, crepitation, contracture is noted), assessment of stability (any dislocation, subluxation, laxity is noted), assessment of muscle strength and tone (any atrophy or abnormal movements is noted). Pelvis appears normal.  Spinal contours are normal.  No spine tenderness to palpation or percussion. Three finger spaces between ribs and pelvis. Gait steady without assistance. NEUROLOGICAL. Able to rise from chair without using arms. No apparent motor or sensory deficit. Coordination appears normal     BONE DENSITY. Most recent done here using Hologic equipment. T-scores   Initial study: 11/06/2018 L1-L4 -3.0 right fem. neck -2.1   Current study: 11/02/2022 L1-L4 -2.3 right fem. neck -2.9     The table below shows bone mineral density (grams/cm2), the appropriate measure for comparing serial scans. A significant increase or decrease is based on precision studies done at our center according to the ISCD protocol with a least significant change of 0.030 g/cm2. PA spine Proximal Femur (right)   Date L1-L4  Fem. neck Trochanter Total hip   11/08/2018 0.766 0.641 --- 0.769   10/21/2020 0.777 0.530 0.512 0.656   11/02/2022 0.838 0.540 0.537 0.707     IMPRESSION:  BONE DENSITY IS LOW, CONSISTENT WITH OSTEOPOROSIS. SINCE THE LAST DXA, BMD INCREASED IN THE SPINE AND TOTAL HIP AND IS TRENDING UP IN THE TROCHANTER. Labs: 05/2022 Ca 9.7 Cr 1.5 GFR 47. 08/2022 Cr 1.5 GFR 57 Ca 9.7. Imaging: DXA printouts reviewed. ASSESSMENT. Osteoporosis, bone density lower than desirable and lower than expected for age, race and sex. Without effective therapy, risk of fracture is high. He is doing well with Reclast given 04/2019, 11/2020, 01/2022. THERAPEUTIC PLANS. Calcium, current calcium intake is fine. Vitamin D, current vitamin D intake is fine. Exercise, current exercise program is fine. Pharmacologic therapy, he has had a good response to 3 doses of Reclast so we can skip for a holiday of up to 3 years. Return appointment with DXA in 1 year. Total time today: 60-74 minutes. Lynette Swenson MD, Director, [facility]) Osteoporosis and Bone Health Services    CC: Brandin Harris MD

## 2022-11-02 NOTE — RESULT ENCOUNTER NOTE
[facility]) Osteoporosis and [address] [facility] [address], [address] [facility],[place] [address]  Phone [phone]  Fax [phone]    NAME: Radha Johnston   : 1960   STUDY DATE: 2022     REFERRING PROVIDER: Evangelist Palmer MD    INDICATION(S) FOR PERFORMING THE STUDY:  osteoporosis, age related (M81.0)    CLINICAL INFORMATION PROVIDED BY THE PATIENT: 39-year-old man. No history of fragility fractures. Liver transplant . He received Reclast 2019, 2020, 2022. Family history of osteoporosis (mother)    EQUIPMENT: Hologic [employer]. POSITIONING: Good. REGIONS OF INTEREST: Correct. ARTIFACTS: None. STUDY VALID? Yes. Spine BMD is spuriously high because of generalized degenerative change. No adjustments were made. T-scores   Initial study: 2018 L1-L4 -3.0 right fem. neck -2.1   Current study: 2022 L1-L4 -2.3 right fem. neck -2.9     The table below shows bone mineral density (grams/cm2), the appropriate measure for comparing serial scans. A significant increase or decrease is based on precision studies done at our center according to the ISCD protocol with a least significant change of 0.030 g/cm2. PA spine Proximal Femur (right)   Date L1-L4  Fem. neck Trochanter Total hip   2018 0.766 0.641 --- 0.769   10/21/2020 0.777 0.530 0.512 0.656   2022 0.838 0.540 0.537 0.707     IMPRESSION:  BONE DENSITY IS LOW, CONSISTENT WITH OSTEOPOROSIS. SINCE THE LAST DXA, BMD INCREASED IN THE SPINE AND TOTAL HIP AND IS TRENDING UP IN THE TROCHANTER. Consider repeating this study in 1-2 years to assess the patient's progress. _________________________________________________   Anatoliy Swenson MD, Director, [facility]) Osteoporosis and [address] [facility]

## 2022-11-03 DIAGNOSIS — E78.00 PURE HYPERCHOLESTEROLEMIA: ICD-10-CM

## 2022-11-05 RX ORDER — ATORVASTATIN CALCIUM 40 MG/1
TABLET, FILM COATED ORAL
Qty: 90 TABLET | Refills: 1 | Status: SHIPPED | OUTPATIENT
Start: 2022-11-05

## 2022-11-05 RX ORDER — ATORVASTATIN CALCIUM 40 MG/1
40 TABLET, FILM COATED ORAL DAILY
Qty: 90 TABLET | Refills: 1 | Status: SHIPPED | OUTPATIENT
Start: 2022-11-05

## 2022-12-21 DIAGNOSIS — I10 ESSENTIAL HYPERTENSION: ICD-10-CM

## 2022-12-21 DIAGNOSIS — E78.2 HYPERLIPIDEMIA, MIXED: ICD-10-CM

## 2022-12-21 DIAGNOSIS — Z00.00 PREVENTATIVE HEALTH CARE: ICD-10-CM

## 2022-12-21 LAB
A/G RATIO: 1.5 (ref 1.1–2.2)
ALBUMIN SERPL-MCNC: 4.6 G/DL (ref 3.4–5)
ALP BLD-CCNC: 97 U/L (ref 40–129)
ALT SERPL-CCNC: 47 U/L (ref 10–40)
ANION GAP SERPL CALCULATED.3IONS-SCNC: 14 MMOL/L (ref 3–16)
AST SERPL-CCNC: 55 U/L (ref 15–37)
BILIRUB SERPL-MCNC: 0.7 MG/DL (ref 0–1)
BUN BLDV-MCNC: 15 MG/DL (ref 7–20)
CALCIUM SERPL-MCNC: 9.8 MG/DL (ref 8.3–10.6)
CHLORIDE BLD-SCNC: 104 MMOL/L (ref 99–110)
CHOLESTEROL, FASTING: 141 MG/DL (ref 0–199)
CO2: 25 MMOL/L (ref 21–32)
CREAT SERPL-MCNC: 1.2 MG/DL (ref 0.8–1.3)
GFR SERPL CREATININE-BSD FRML MDRD: >60 ML/MIN/{1.73_M2}
GLUCOSE BLD-MCNC: 111 MG/DL (ref 70–99)
HDLC SERPL-MCNC: 43 MG/DL (ref 40–60)
LDL CHOLESTEROL CALCULATED: 80 MG/DL
POTASSIUM SERPL-SCNC: 4.7 MMOL/L (ref 3.5–5.1)
PROSTATE SPECIFIC ANTIGEN: 0.59 NG/ML (ref 0–4)
SODIUM BLD-SCNC: 143 MMOL/L (ref 136–145)
TOTAL PROTEIN: 7.7 G/DL (ref 6.4–8.2)
TRIGLYCERIDE, FASTING: 92 MG/DL (ref 0–150)
VLDLC SERPL CALC-MCNC: 18 MG/DL

## 2022-12-27 ENCOUNTER — OFFICE VISIT (OUTPATIENT)
Dept: INTERNAL MEDICINE CLINIC | Age: 62
End: 2022-12-27
Payer: COMMERCIAL

## 2022-12-27 VITALS
OXYGEN SATURATION: 100 % | SYSTOLIC BLOOD PRESSURE: 118 MMHG | HEART RATE: 70 BPM | DIASTOLIC BLOOD PRESSURE: 80 MMHG | TEMPERATURE: 97.9 F | BODY MASS INDEX: 29.3 KG/M2 | WEIGHT: 213 LBS

## 2022-12-27 DIAGNOSIS — M81.0 AGE-RELATED OSTEOPOROSIS WITHOUT CURRENT PATHOLOGICAL FRACTURE: Primary | ICD-10-CM

## 2022-12-27 DIAGNOSIS — K70.9 LIVER DISEASE, CHRONIC, DUE TO ALCOHOL (HCC): ICD-10-CM

## 2022-12-27 DIAGNOSIS — E78.5 HYPERLIPIDEMIA, UNSPECIFIED HYPERLIPIDEMIA TYPE: ICD-10-CM

## 2022-12-27 DIAGNOSIS — I10 PRIMARY HYPERTENSION: ICD-10-CM

## 2022-12-27 PROCEDURE — 3078F DIAST BP <80 MM HG: CPT | Performed by: HOSPITALIST

## 2022-12-27 PROCEDURE — 1036F TOBACCO NON-USER: CPT | Performed by: HOSPITALIST

## 2022-12-27 PROCEDURE — 3017F COLORECTAL CA SCREEN DOC REV: CPT | Performed by: HOSPITALIST

## 2022-12-27 PROCEDURE — 3074F SYST BP LT 130 MM HG: CPT | Performed by: HOSPITALIST

## 2022-12-27 PROCEDURE — G8419 CALC BMI OUT NRM PARAM NOF/U: HCPCS | Performed by: HOSPITALIST

## 2022-12-27 PROCEDURE — G8484 FLU IMMUNIZE NO ADMIN: HCPCS | Performed by: HOSPITALIST

## 2022-12-27 PROCEDURE — G8427 DOCREV CUR MEDS BY ELIG CLIN: HCPCS | Performed by: HOSPITALIST

## 2022-12-27 PROCEDURE — 99214 OFFICE O/P EST MOD 30 MIN: CPT | Performed by: HOSPITALIST

## 2022-12-27 SDOH — ECONOMIC STABILITY: FOOD INSECURITY: WITHIN THE PAST 12 MONTHS, YOU WORRIED THAT YOUR FOOD WOULD RUN OUT BEFORE YOU GOT MONEY TO BUY MORE.: NEVER TRUE

## 2022-12-27 SDOH — ECONOMIC STABILITY: FOOD INSECURITY: WITHIN THE PAST 12 MONTHS, THE FOOD YOU BOUGHT JUST DIDN'T LAST AND YOU DIDN'T HAVE MONEY TO GET MORE.: NEVER TRUE

## 2022-12-27 ASSESSMENT — SOCIAL DETERMINANTS OF HEALTH (SDOH): HOW HARD IS IT FOR YOU TO PAY FOR THE VERY BASICS LIKE FOOD, HOUSING, MEDICAL CARE, AND HEATING?: NOT HARD AT ALL

## 2022-12-27 ASSESSMENT — PATIENT HEALTH QUESTIONNAIRE - PHQ9
SUM OF ALL RESPONSES TO PHQ QUESTIONS 1-9: 0
2. FEELING DOWN, DEPRESSED OR HOPELESS: 0
SUM OF ALL RESPONSES TO PHQ QUESTIONS 1-9: 0
SUM OF ALL RESPONSES TO PHQ QUESTIONS 1-9: 0
SUM OF ALL RESPONSES TO PHQ9 QUESTIONS 1 & 2: 0
SUM OF ALL RESPONSES TO PHQ QUESTIONS 1-9: 0
1. LITTLE INTEREST OR PLEASURE IN DOING THINGS: 0

## 2022-12-27 ASSESSMENT — ENCOUNTER SYMPTOMS
COUGH: 0
BLOOD IN STOOL: 0
VOICE CHANGE: 0
BACK PAIN: 0
SINUS PAIN: 0
DIARRHEA: 0
WHEEZING: 0
ABDOMINAL DISTENTION: 0
CONSTIPATION: 0
SORE THROAT: 0
CHEST TIGHTNESS: 0
TROUBLE SWALLOWING: 0
ABDOMINAL PAIN: 0
VOMITING: 0
NAUSEA: 0
SINUS PRESSURE: 0
SHORTNESS OF BREATH: 0

## 2022-12-27 NOTE — PROGRESS NOTES
[facility] Internal Medicine  Follow-up visit   2022    Darylene Bolk (:  1960) is a 57 y.o. male, here for follow-up:    Chief Complaint   Patient presents with    6 Month Follow-Up     Blood pressure         HPI  -Exercises 3 x weekly at the gym.  -No current smoking. Liver disease, chronic, due to alcohol ([address]  -S/P transplant 2019 at [facility]. On Tacrolimus and Cellcept. Follows with Dr. Keli Fernando at [facility].    -Had HRS/Recovered. Follows with Dr. Johan Lagos. Last Cr=1.2 with GFR>60 2022. Hyperlipidemia  -Well controlled on Atorvastatin 40 by the 2022  labs. Hypertension  BP controlled on carvedilol     Former cigarette smoker  Denies smoking     Osteoporosis:    Seeing  Dr. Tyler Becker. ROS  Review of Systems   Constitutional:  Negative for activity change, appetite change, chills, diaphoresis, fatigue, fever and unexpected weight change. HENT:  Negative for sinus pressure, sinus pain, sore throat, trouble swallowing and voice change. Eyes:  Negative for visual disturbance. Respiratory:  Negative for cough, chest tightness, shortness of breath and wheezing. Cardiovascular:  Negative for chest pain, palpitations and leg swelling. Gastrointestinal:  Negative for abdominal distention, abdominal pain, blood in stool, constipation, diarrhea, nausea and vomiting. Endocrine: Negative for polydipsia and polyphagia. Genitourinary:  Negative for decreased urine volume, difficulty urinating, dysuria and urgency. Musculoskeletal:  Negative for back pain, gait problem, joint swelling and myalgias. Neurological:  Negative for dizziness, seizures, syncope, light-headedness and headaches. Psychiatric/Behavioral:  Negative for agitation, behavioral problems, confusion and suicidal ideas.       HISTORIES  Current Outpatient Medications on File Prior to Visit   Medication Sig Dispense Refill    carvedilol (COREG) 12.5 MG tablet TAKE ONE TABLET BY MOUTH TWO TIMES DAILY (WITH MEALS) 180 tablet 1    atorvastatin (LIPITOR) 40 MG tablet TAKE ONE TABLET BY MOUTH DAILY 90 tablet 1    Calcium Carbonate-Vitamin D (OYSTER SHELL CALCIUM/D) 500-200 MG-UNIT TABS Take 1 tablet by mouth 2 times daily (with meals)      clindamycin (CLINDAGEL) 1 % gel       BENZOYL PEROXIDE 5 % external wash       Azelaic Acid 15 % GEL       diphenhydrAMINE (BENADRYL) 50 MG tablet Take 50 mg by mouth nightly as needed for Itching      acetaminophen (TYLENOL) 500 MG tablet Take 325 mg by mouth every 6 hours as needed      tacrolimus (PROGRAF) 1 MG capsule Take 1 mg by mouth 2 times daily      mycophenolate (CELLCEPT) 500 MG tablet Take 500 mg by mouth 2 times daily      Multiple Vitamins-Minerals (THERAPEUTIC MULTIVITAMIN-MINERALS) tablet Take 1 tablet by mouth daily      pantoprazole (PROTONIX) 40 MG tablet Take 1 tablet (40 mg total) by mouth every morning before breakfast. (Patient not taking: No sig reported) 30 tablet 3     No current facility-administered medications on file prior to visit.       No Known Allergies  Past Medical History:   Diagnosis Date    Cirrhosis ([facility] [address]     Hyperlipidemia     Hypertension      Patient Active Problem List   Diagnosis    Hypertension    Hyperlipidemia    Former cigarette smoker    History of alcohol abuse    Insomnia disorder    Elevated MCV    Liver disease, chronic, due to alcohol ([facility] [address]    Age-related osteoporosis without current pathological fracture     Past Surgical History:   Procedure Laterality Date    KNEE SURGERY Right 1969    fall; glass injury - laceration    HI ESOPHAGOGASTRODUODENOSCOPY TRANSORAL DIAGNOSTIC N/A 7/18/2018    EGD ESOPHAGOGASTRODUODENOSCOPY WITH ESOPHAGEAL VARICES BANDING X 2 WITH MAC ANESTHESIA performed by Parviz Godinez MD at [facility] N/A 8/16/2018    EGD BAND LIGATION OF ESOPHAGEAL VARICES X3 WITH MAC performed by Parviz Godinez MD at [place] [address] History    Marital status: Single     Spouse name: Not on file    Number of children: Not on file    Years of education: Not on file    Highest education level: Not on file   Occupational History    Not on file   Tobacco Use    Smoking status: Former     Packs/day: 0.50     Years: 38.00     Pack years: 19.00     Types: Cigarettes     Start date: 1979     Quit date: 2017     Years since quittin.6    Smokeless tobacco: Never    Tobacco comments:     has not smoked in 6mo   Vaping Use    Vaping Use: Never used   Substance and Sexual Activity    Alcohol use: Yes     Comment: 14 drinks per week    Drug use: No    Sexual activity: Not on file   Other Topics Concern    Not on file   Social History Narrative    Not on file     Social Determinants of Health     Financial Resource Strain: Low Risk     Difficulty of Paying Living Expenses: Not hard at all   Food Insecurity: No Food Insecurity    Worried About Running Out of Food in the Last Year: Never true    Ran Out of Food in the Last Year: Never true   Transportation Needs: Not on file   Physical Activity: Not on file   Stress: Not on file   Social Connections: Not on file   Intimate Partner Violence: Not on file   Housing Stability: Not on file      Family History   Problem Relation Age of Onset    Diabetes Mother     Heart Failure Mother     Osteoporosis Mother        PE  Vitals:    22 1622   BP: 118/80   Pulse: 70   Temp: 97.9 °F (36.6 °C)   SpO2: 100%   Weight: 213 lb (96.6 kg)     Estimated body mass index is 29.3 kg/m² as calculated from the following:    Height as of 22: 5' 11.5\" (1.816 m). Weight as of this encounter: 213 lb (96.6 kg). Physical Exam  Vitals reviewed. Constitutional:       General: He is not in acute distress. Appearance: Normal appearance. HENT:      Head: Normocephalic and atraumatic. Mouth/Throat:      Pharynx: Oropharynx is clear.    Eyes:      Conjunctiva/sclera: Conjunctivae normal.      Pupils: Pupils are equal, round, and reactive to light. Cardiovascular:      Rate and Rhythm: Normal rate and regular rhythm. Pulses: Normal pulses. Heart sounds: Normal heart sounds. Pulmonary:      Effort: Pulmonary effort is normal. No respiratory distress. Breath sounds: Normal breath sounds. No wheezing or rales. Abdominal:      Palpations: Abdomen is soft. Tenderness: There is no abdominal tenderness. There is no rebound. Musculoskeletal:         General: No signs of injury. Normal range of motion. Cervical back: Normal range of motion and neck supple. Skin:     General: Skin is warm and dry. Coloration: Skin is not jaundiced. Neurological:      General: No focal deficit present. Mental Status: He is alert and oriented to person, place, and time. Psychiatric:         Behavior: Behavior normal.         Thought Content: Thought content normal.         Judgment: Judgment normal.       ASSESSMENT/ PLAN:  There are no diagnoses linked to this encounter. No orders of the defined types were placed in this encounter. No orders of the defined types were placed in this encounter. Medications Discontinued During This Encounter   Medication Reason    atorvastatin (LIPITOR) 40 MG tablet ERROR        1. Age-related osteoporosis without current pathological fracture  - Ventura Beltran MD, Endocrinology, [facility]     2. Liver disease, chronic, due to alcohol ([facility] [address]  -Stable. Follows with Dr. Bharath Horner. -CMP of 6/20     3. Hyperlipidemia, mixed  - Continue Atorvastatin 40   - Comprehensive Metabolic Panel; Future  - Lipid, Fasting; Future     4. Essential hypertension  -  Well controlled on Carvedilol.    - Comprehensive Metabolic Panel; Future     5. Preventative health care  - PSA, Prostatic Specific Antigen; Future    Alexa Mueller MD    This dictation was generated by voice recognition computer software.   Although all attempts are made to edit the dictation for accuracy, there may be errors in the transcription that are not intended.

## 2023-05-03 DIAGNOSIS — E78.00 PURE HYPERCHOLESTEROLEMIA: ICD-10-CM

## 2023-05-04 RX ORDER — ATORVASTATIN CALCIUM 40 MG/1
40 TABLET, FILM COATED ORAL DAILY
Qty: 90 TABLET | Refills: 1 | Status: SHIPPED | OUTPATIENT
Start: 2023-05-04

## 2023-07-10 DIAGNOSIS — I95.9 HYPOTENSION, UNSPECIFIED HYPOTENSION TYPE: ICD-10-CM

## 2023-07-10 DIAGNOSIS — D64.9 ANEMIA, UNSPECIFIED TYPE: ICD-10-CM

## 2023-07-10 DIAGNOSIS — K76.7 HEPATORENAL SYNDROME (HCC): ICD-10-CM

## 2023-07-10 DIAGNOSIS — Z94.4 S/P LIVER TRANSPLANT (HCC): ICD-10-CM

## 2023-07-10 DIAGNOSIS — B25.9 CYTOMEGALOVIRUS INFECTION, UNSPECIFIED CYTOMEGALOVIRAL INFECTION TYPE (HCC): ICD-10-CM

## 2023-07-10 DIAGNOSIS — N17.9 AKI (ACUTE KIDNEY INJURY) (HCC): ICD-10-CM

## 2023-07-10 DIAGNOSIS — E87.1 HYPONATREMIA: ICD-10-CM

## 2023-07-10 LAB
ALBUMIN SERPL-MCNC: 4.5 G/DL (ref 3.4–5)
ANION GAP SERPL CALCULATED.3IONS-SCNC: 12 MMOL/L (ref 3–16)
BUN SERPL-MCNC: 17 MG/DL (ref 7–20)
CALCIUM SERPL-MCNC: 9.9 MG/DL (ref 8.3–10.6)
CHLORIDE SERPL-SCNC: 104 MMOL/L (ref 99–110)
CO2 SERPL-SCNC: 24 MMOL/L (ref 21–32)
CREAT SERPL-MCNC: 1.3 MG/DL (ref 0.8–1.3)
CREAT UR-MCNC: 288.4 MG/DL (ref 39–259)
GFR SERPLBLD CREATININE-BSD FMLA CKD-EPI: >60 ML/MIN/{1.73_M2}
GLUCOSE SERPL-MCNC: 106 MG/DL (ref 70–99)
MICROALBUMIN UR DL<=1MG/L-MCNC: <1.2 MG/DL
MICROALBUMIN/CREAT UR: ABNORMAL MG/G (ref 0–30)
PHOSPHATE SERPL-MCNC: 3.7 MG/DL (ref 2.5–4.9)
POTASSIUM SERPL-SCNC: 4.4 MMOL/L (ref 3.5–5.1)
SODIUM SERPL-SCNC: 140 MMOL/L (ref 136–145)

## 2023-10-29 DIAGNOSIS — E78.00 PURE HYPERCHOLESTEROLEMIA: ICD-10-CM

## 2023-10-30 RX ORDER — ATORVASTATIN CALCIUM 40 MG/1
40 TABLET, FILM COATED ORAL DAILY
Qty: 90 TABLET | Refills: 1 | Status: SHIPPED | OUTPATIENT
Start: 2023-10-30

## 2023-12-21 DIAGNOSIS — Z00.00 PREVENTATIVE HEALTH CARE: Primary | ICD-10-CM

## 2023-12-21 ASSESSMENT — PATIENT HEALTH QUESTIONNAIRE - PHQ9
1. LITTLE INTEREST OR PLEASURE IN DOING THINGS: NOT AT ALL
2. FEELING DOWN, DEPRESSED OR HOPELESS: NOT AT ALL
SUM OF ALL RESPONSES TO PHQ9 QUESTIONS 1 & 2: 0

## 2023-12-23 DIAGNOSIS — Z00.00 PREVENTATIVE HEALTH CARE: ICD-10-CM

## 2023-12-23 LAB
ALBUMIN SERPL-MCNC: 4.4 G/DL (ref 3.4–5)
ALBUMIN/GLOB SERPL: 1.4 {RATIO} (ref 1.1–2.2)
ALP SERPL-CCNC: 101 U/L (ref 40–129)
ALT SERPL-CCNC: 27 U/L (ref 10–40)
ANION GAP SERPL CALCULATED.3IONS-SCNC: 11 MMOL/L (ref 3–16)
AST SERPL-CCNC: 37 U/L (ref 15–37)
BILIRUB SERPL-MCNC: 1 MG/DL (ref 0–1)
BUN SERPL-MCNC: 12 MG/DL (ref 7–20)
CALCIUM SERPL-MCNC: 9.1 MG/DL (ref 8.3–10.6)
CHLORIDE SERPL-SCNC: 103 MMOL/L (ref 99–110)
CHOLEST SERPL-MCNC: 152 MG/DL (ref 0–199)
CO2 SERPL-SCNC: 24 MMOL/L (ref 21–32)
CREAT SERPL-MCNC: 1.2 MG/DL (ref 0.8–1.3)
DEPRECATED RDW RBC AUTO: 13.6 % (ref 12.4–15.4)
GFR SERPLBLD CREATININE-BSD FMLA CKD-EPI: >60 ML/MIN/{1.73_M2}
GLUCOSE SERPL-MCNC: 135 MG/DL (ref 70–99)
HCT VFR BLD AUTO: 46.6 % (ref 40.5–52.5)
HDLC SERPL-MCNC: 50 MG/DL (ref 40–60)
HGB BLD-MCNC: 15.7 G/DL (ref 13.5–17.5)
LDL CHOLESTEROL CALCULATED: 83 MG/DL
MCH RBC QN AUTO: 32.3 PG (ref 26–34)
MCHC RBC AUTO-ENTMCNC: 33.7 G/DL (ref 31–36)
MCV RBC AUTO: 95.9 FL (ref 80–100)
PLATELET # BLD AUTO: 199 K/UL (ref 135–450)
PMV BLD AUTO: 10.6 FL (ref 5–10.5)
POTASSIUM SERPL-SCNC: 4.3 MMOL/L (ref 3.5–5.1)
PROT SERPL-MCNC: 7.6 G/DL (ref 6.4–8.2)
PSA SERPL DL<=0.01 NG/ML-MCNC: 0.65 NG/ML (ref 0–4)
RBC # BLD AUTO: 4.86 M/UL (ref 4.2–5.9)
SODIUM SERPL-SCNC: 138 MMOL/L (ref 136–145)
TRIGL SERPL-MCNC: 93 MG/DL (ref 0–150)
TSH SERPL DL<=0.005 MIU/L-ACNC: 1.14 UIU/ML (ref 0.27–4.2)
VLDLC SERPL CALC-MCNC: 19 MG/DL
WBC # BLD AUTO: 7 K/UL (ref 4–11)

## 2023-12-28 ENCOUNTER — OFFICE VISIT (OUTPATIENT)
Dept: INTERNAL MEDICINE CLINIC | Age: 63
End: 2023-12-28
Payer: COMMERCIAL

## 2023-12-28 VITALS
SYSTOLIC BLOOD PRESSURE: 132 MMHG | BODY MASS INDEX: 29.54 KG/M2 | WEIGHT: 211 LBS | DIASTOLIC BLOOD PRESSURE: 70 MMHG | HEIGHT: 71 IN

## 2023-12-28 DIAGNOSIS — F10.11 HISTORY OF ALCOHOL ABUSE: ICD-10-CM

## 2023-12-28 DIAGNOSIS — R73.9 HYPERGLYCEMIA: ICD-10-CM

## 2023-12-28 DIAGNOSIS — E78.00 PURE HYPERCHOLESTEROLEMIA: ICD-10-CM

## 2023-12-28 DIAGNOSIS — Z00.00 ANNUAL PHYSICAL EXAM: Primary | ICD-10-CM

## 2023-12-28 DIAGNOSIS — I10 PRIMARY HYPERTENSION: ICD-10-CM

## 2023-12-28 DIAGNOSIS — Z87.891 FORMER CIGARETTE SMOKER: ICD-10-CM

## 2023-12-28 DIAGNOSIS — K70.9 LIVER DISEASE, CHRONIC, DUE TO ALCOHOL (HCC): ICD-10-CM

## 2023-12-28 LAB — HBA1C MFR BLD: 5.9 %

## 2023-12-28 PROCEDURE — G8417 CALC BMI ABV UP PARAM F/U: HCPCS | Performed by: HOSPITALIST

## 2023-12-28 PROCEDURE — 3078F DIAST BP <80 MM HG: CPT | Performed by: HOSPITALIST

## 2023-12-28 PROCEDURE — 99214 OFFICE O/P EST MOD 30 MIN: CPT | Performed by: HOSPITALIST

## 2023-12-28 PROCEDURE — G8484 FLU IMMUNIZE NO ADMIN: HCPCS | Performed by: HOSPITALIST

## 2023-12-28 PROCEDURE — 1036F TOBACCO NON-USER: CPT | Performed by: HOSPITALIST

## 2023-12-28 PROCEDURE — 3017F COLORECTAL CA SCREEN DOC REV: CPT | Performed by: HOSPITALIST

## 2023-12-28 PROCEDURE — G8427 DOCREV CUR MEDS BY ELIG CLIN: HCPCS | Performed by: HOSPITALIST

## 2023-12-28 PROCEDURE — 99396 PREV VISIT EST AGE 40-64: CPT | Performed by: HOSPITALIST

## 2023-12-28 PROCEDURE — 3075F SYST BP GE 130 - 139MM HG: CPT | Performed by: HOSPITALIST

## 2023-12-28 PROCEDURE — 83036 HEMOGLOBIN GLYCOSYLATED A1C: CPT | Performed by: HOSPITALIST

## 2023-12-28 NOTE — PROGRESS NOTES
[facility]    Renee Padilla is a 60 y.o. male, here for evaluation of the following concerns:    - Last had a cigarette in 2017  - No drinking alcohol anymore   - Last colonoscopy was 2 years ago and a few polyps found so will get another test done in 2026   - No new complaints     Liver disease, chronic, due to alcohol ([address])  -S/P transplant 2019 at [facility]. On Tacrolimus and Cellcept. Follows with Dr. Philip Mcintyre at [facility].    -Had HRS/Recovered. Follows with Dr. Michael Quiroz. Last Cr=1.2 with GFR>60 12/21/2022. Hyperlipidemia  -Well controlled on Atorvastatin 40 by the 12/21/2022  labs. Hypertension  BP controlled on carvedilol - was 132/70     Former cigarette smoker  Denies smoking     Osteoporosis:    Seeing  Dr. Annamarie Danielle. Review of Systems   Constitutional:  Negative for unexpected weight change. Eyes:  Negative for visual disturbance. Respiratory:  Negative for cough and shortness of breath. Cardiovascular:  Negative for chest pain. Gastrointestinal:  Negative for abdominal pain, blood in stool, constipation, diarrhea, nausea and vomiting. Musculoskeletal:  Negative for arthralgias and back pain. Neurological:  Negative for headaches. Psychiatric/Behavioral:  Negative for sleep disturbance. MEDICATIONS:  Prior to Visit Medications    Medication Sig Taking?  Authorizing Provider   atorvastatin (LIPITOR) 40 MG tablet Take 1 tablet by mouth daily Yes Brett Faith MD   carvedilol (COREG) 12.5 MG tablet TAKE ONE TABLET BY MOUTH TWO TIMES DAILY (WITH MEALS) Yes Monika Doyle MD   Calcium Carbonate-Vitamin D (OYSTER SHELL CALCIUM/D) 500-200 MG-UNIT TABS Take 1 tablet by mouth 2 times daily (with meals) Yes Provider, MD Carlos   clindamycin (CLINDAGEL) 1 % gel  Yes Provider, Historical, MD   BENZOYL PEROXIDE 5 % external wash  Yes Provider, MD Carlos   Azelaic Acid 15 % GEL  Yes Provider, MD Carlos   diphenhydrAMINE (BENADRYL) 50 MG

## 2024-04-23 DIAGNOSIS — E78.00 PURE HYPERCHOLESTEROLEMIA: ICD-10-CM

## 2024-04-23 RX ORDER — ATORVASTATIN CALCIUM 40 MG/1
40 TABLET, FILM COATED ORAL DAILY
Qty: 90 TABLET | Refills: 0 | Status: SHIPPED | OUTPATIENT
Start: 2024-04-23

## 2024-08-01 DIAGNOSIS — E78.00 PURE HYPERCHOLESTEROLEMIA: ICD-10-CM

## 2024-08-01 RX ORDER — ATORVASTATIN CALCIUM 40 MG/1
40 TABLET, FILM COATED ORAL DAILY
Qty: 90 TABLET | Refills: 0 | Status: SHIPPED | OUTPATIENT
Start: 2024-08-01

## 2024-10-24 DIAGNOSIS — E78.00 PURE HYPERCHOLESTEROLEMIA: ICD-10-CM

## 2024-10-25 RX ORDER — ATORVASTATIN CALCIUM 40 MG/1
40 TABLET, FILM COATED ORAL DAILY
Qty: 90 TABLET | Refills: 1 | Status: SHIPPED | OUTPATIENT
Start: 2024-10-25

## 2024-10-25 NOTE — TELEPHONE ENCOUNTER
Last appointment: 12/28/2023  Next appointment: 1/2/2025  Last refill:   Requested Prescriptions     Pending Prescriptions Disp Refills    atorvastatin (LIPITOR) 40 MG tablet [Pharmacy Med Name: ATORVASTATIN 40 MG TABLET] 90 tablet 0     Sig: TAKE 1 TABLET BY MOUTH DAILY

## 2024-12-30 DIAGNOSIS — Z00.00 PREVENTATIVE HEALTH CARE: ICD-10-CM

## 2024-12-30 DIAGNOSIS — E78.00 PURE HYPERCHOLESTEROLEMIA: Primary | ICD-10-CM

## 2024-12-30 DIAGNOSIS — R73.9 HYPERGLYCEMIA: ICD-10-CM

## 2025-01-13 DIAGNOSIS — Z00.00 PREVENTATIVE HEALTH CARE: ICD-10-CM

## 2025-01-13 DIAGNOSIS — R73.9 HYPERGLYCEMIA: ICD-10-CM

## 2025-01-13 DIAGNOSIS — E78.00 PURE HYPERCHOLESTEROLEMIA: ICD-10-CM

## 2025-01-13 LAB
CHOLEST SERPL-MCNC: 183 MG/DL (ref 0–199)
EST. AVERAGE GLUCOSE BLD GHB EST-MCNC: 111.2 MG/DL
HBA1C MFR BLD: 5.5 %
HDLC SERPL-MCNC: 55 MG/DL (ref 40–60)
LDL CHOLESTEROL: 99 MG/DL
PSA SERPL DL<=0.01 NG/ML-MCNC: 0.61 NG/ML (ref 0–4)
TRIGL SERPL-MCNC: 147 MG/DL (ref 0–150)
VLDLC SERPL CALC-MCNC: 29 MG/DL

## 2025-01-25 SDOH — ECONOMIC STABILITY: INCOME INSECURITY: IN THE LAST 12 MONTHS, WAS THERE A TIME WHEN YOU WERE NOT ABLE TO PAY THE MORTGAGE OR RENT ON TIME?: NO

## 2025-01-25 SDOH — ECONOMIC STABILITY: FOOD INSECURITY: WITHIN THE PAST 12 MONTHS, YOU WORRIED THAT YOUR FOOD WOULD RUN OUT BEFORE YOU GOT MONEY TO BUY MORE.: NEVER TRUE

## 2025-01-25 SDOH — HEALTH STABILITY: PHYSICAL HEALTH: ON AVERAGE, HOW MANY MINUTES DO YOU ENGAGE IN EXERCISE AT THIS LEVEL?: 120 MIN

## 2025-01-25 SDOH — HEALTH STABILITY: PHYSICAL HEALTH: ON AVERAGE, HOW MANY DAYS PER WEEK DO YOU ENGAGE IN MODERATE TO STRENUOUS EXERCISE (LIKE A BRISK WALK)?: 4 DAYS

## 2025-01-25 SDOH — ECONOMIC STABILITY: FOOD INSECURITY: WITHIN THE PAST 12 MONTHS, THE FOOD YOU BOUGHT JUST DIDN'T LAST AND YOU DIDN'T HAVE MONEY TO GET MORE.: NEVER TRUE

## 2025-01-25 ASSESSMENT — PATIENT HEALTH QUESTIONNAIRE - PHQ9
SUM OF ALL RESPONSES TO PHQ QUESTIONS 1-9: 0
1. LITTLE INTEREST OR PLEASURE IN DOING THINGS: NOT AT ALL
2. FEELING DOWN, DEPRESSED OR HOPELESS: NOT AT ALL
SUM OF ALL RESPONSES TO PHQ QUESTIONS 1-9: 0
SUM OF ALL RESPONSES TO PHQ9 QUESTIONS 1 & 2: 0

## 2025-01-25 ASSESSMENT — LIFESTYLE VARIABLES
HOW MANY STANDARD DRINKS CONTAINING ALCOHOL DO YOU HAVE ON A TYPICAL DAY: 1 OR 2
HOW MANY STANDARD DRINKS CONTAINING ALCOHOL DO YOU HAVE ON A TYPICAL DAY: 1
HOW OFTEN DO YOU HAVE SIX OR MORE DRINKS ON ONE OCCASION: 1
HOW OFTEN DO YOU HAVE A DRINK CONTAINING ALCOHOL: 2-3 TIMES A WEEK
HOW OFTEN DO YOU HAVE A DRINK CONTAINING ALCOHOL: 4

## 2025-01-28 ENCOUNTER — OFFICE VISIT (OUTPATIENT)
Dept: INTERNAL MEDICINE CLINIC | Age: 65
End: 2025-01-28

## 2025-01-28 VITALS
BODY MASS INDEX: 31.36 KG/M2 | HEART RATE: 68 BPM | WEIGHT: 224 LBS | SYSTOLIC BLOOD PRESSURE: 158 MMHG | HEIGHT: 71 IN | OXYGEN SATURATION: 99 % | TEMPERATURE: 97.4 F | DIASTOLIC BLOOD PRESSURE: 94 MMHG

## 2025-01-28 DIAGNOSIS — Z00.00 WELCOME TO MEDICARE PREVENTIVE VISIT: Primary | ICD-10-CM

## 2025-01-28 DIAGNOSIS — E78.00 PURE HYPERCHOLESTEROLEMIA: ICD-10-CM

## 2025-01-28 DIAGNOSIS — I10 PRIMARY HYPERTENSION: ICD-10-CM

## 2025-01-28 DIAGNOSIS — R73.9 HYPERGLYCEMIA: ICD-10-CM

## 2025-01-28 RX ORDER — ATORVASTATIN CALCIUM 40 MG/1
40 TABLET, FILM COATED ORAL DAILY
Qty: 90 TABLET | Refills: 1 | Status: SHIPPED | OUTPATIENT
Start: 2025-01-28

## 2025-01-28 NOTE — PROGRESS NOTES
Medicare Annual Wellness Visit    Grant Monterroso is here for Medicare AWV (Does he need another covid vaccine?)    Assessment & Plan   Welcome to Medicare preventive visit  Primary hypertension  -     CBC; Future  -     Comprehensive Metabolic Panel; Future  Pure hypercholesterolemia  -     atorvastatin (LIPITOR) 40 MG tablet; Take 1 tablet by mouth daily, Disp-90 tablet, R-1Normal  -     Comprehensive Metabolic Panel; Future  -     Lipid, Fasting; Future  Hyperglycemia  -     Hemoglobin A1C; Future       No follow-ups on file.     Subjective   The following acute and/or chronic problems were also addressed today:    In conjunction with this visit I have independently interpreted the laboratory work done 1/13/2025.  The patient had a normal comprehensive metabolic profile.  Magnesium level was normal.  Hemoglobin A1c was normal at 5.5.  Lipids were well-controlled on atorvastatin 40.  PSA was normal.    Liver disease, chronic, due to alcohol (HCC)  -S/P transplant 2019 at .  On Tacrolimus and Cellcept.  Follows with Dr. Edwards at .    -Had HRS/Recovered.  Follows with Dr. Whitney.  Last Cr=1.3      Hyperlipidemia  This is a chronic problem.  Disease course is stable.  Current therapies include lifestyle modification and pharmacologic therapy.  The patient is using medications without side effects on a regular basis.  There are no compliance issues.  Last laboratory work reflect well-controlled lipids on atorvastatin 40.       Hypertension with CKD stage II  This is a chronic problem.  The disease course is worsening.  The patient is without proteinuria.  He has a baseline creatinine of 1.3 with a EGFR of 61.  BP is poorly controlled today.  He is on a regimen of valsartan 160 mg, furosemide 20 mg MWF, carvedilol 12.5 mg twice daily.  He uses the medications regularly.  He denies any side effects to medications.  Pertinent negatives include no chest pain, palpitations, shortness of breath, or pedal edema.  The

## 2025-05-06 ENCOUNTER — OFFICE VISIT (OUTPATIENT)
Dept: INTERNAL MEDICINE CLINIC | Age: 65
End: 2025-05-06
Payer: MEDICARE

## 2025-05-06 VITALS
SYSTOLIC BLOOD PRESSURE: 124 MMHG | WEIGHT: 220.6 LBS | HEART RATE: 71 BPM | OXYGEN SATURATION: 93 % | BODY MASS INDEX: 30.77 KG/M2 | TEMPERATURE: 97.3 F | DIASTOLIC BLOOD PRESSURE: 80 MMHG

## 2025-05-06 DIAGNOSIS — I10 PRIMARY HYPERTENSION: Primary | ICD-10-CM

## 2025-05-06 DIAGNOSIS — M81.0 AGE-RELATED OSTEOPOROSIS WITHOUT CURRENT PATHOLOGICAL FRACTURE: ICD-10-CM

## 2025-05-06 DIAGNOSIS — K70.9 LIVER DISEASE, CHRONIC, DUE TO ALCOHOL: ICD-10-CM

## 2025-05-06 PROCEDURE — 1123F ACP DISCUSS/DSCN MKR DOCD: CPT | Performed by: HOSPITALIST

## 2025-05-06 PROCEDURE — G2211 COMPLEX E/M VISIT ADD ON: HCPCS | Performed by: HOSPITALIST

## 2025-05-06 PROCEDURE — 1036F TOBACCO NON-USER: CPT | Performed by: HOSPITALIST

## 2025-05-06 PROCEDURE — 3074F SYST BP LT 130 MM HG: CPT | Performed by: HOSPITALIST

## 2025-05-06 PROCEDURE — 3079F DIAST BP 80-89 MM HG: CPT | Performed by: HOSPITALIST

## 2025-05-06 PROCEDURE — 99214 OFFICE O/P EST MOD 30 MIN: CPT | Performed by: HOSPITALIST

## 2025-05-06 PROCEDURE — G8427 DOCREV CUR MEDS BY ELIG CLIN: HCPCS | Performed by: HOSPITALIST

## 2025-05-06 PROCEDURE — 3017F COLORECTAL CA SCREEN DOC REV: CPT | Performed by: HOSPITALIST

## 2025-05-06 PROCEDURE — G8417 CALC BMI ABV UP PARAM F/U: HCPCS | Performed by: HOSPITALIST

## 2025-05-06 RX ORDER — NEBULIZER AND COMPRESSOR
1 EACH MISCELLANEOUS PRN
Qty: 1 KIT | Refills: 0 | Status: SHIPPED | OUTPATIENT
Start: 2025-05-06

## 2025-05-06 ASSESSMENT — ENCOUNTER SYMPTOMS
BACK PAIN: 0
SINUS PRESSURE: 0
BLOOD IN STOOL: 0
SINUS PAIN: 0
SORE THROAT: 0
ABDOMINAL PAIN: 0
CONSTIPATION: 0
ABDOMINAL DISTENTION: 0
TROUBLE SWALLOWING: 0
CHEST TIGHTNESS: 0
SHORTNESS OF BREATH: 0
WHEEZING: 0
VOMITING: 0
VOICE CHANGE: 0
DIARRHEA: 0
NAUSEA: 0
COUGH: 0

## 2025-05-06 NOTE — PROGRESS NOTES
[place] [facility]  Follow-up visit   2025    Grant Monterroso (:  1960) is a 64 y.o. male, here for follow-up:    Chief Complaint   Patient presents with    Other     Blood pressure check         HPI    In conjunction with this visit I have independently interpreted the laboratory work done 3/29/2025.  The patient had a normal renal profile.  Magnesium level was normal.  Hemoglobin A1c was normal at 5.5.  Lipids were well-controlled on atorvastatin 40.  PSA was normal.     Liver disease, chronic, due to alcohol (HCC)  -S/P transplant 2019 at .  On Tacrolimus and Cellcept.  Follows with Dr. Edwards at .    -Had HRS/Recovered.  Follows with Dr. Whitney.  Last Cr=1.1      Hyperlipidemia  This is a chronic problem.  Disease course is stable.  Current therapies include lifestyle modification and pharmacologic therapy.  The patient is using medications without side effects on a regular basis.  There are no compliance issues.  Last laboratory work reflect well-controlled lipids on atorvastatin 40.       Hypertension with CKD stage II  This is a chronic problem.  The disease course is worsening.  The patient is without proteinuria.  He has a baseline creatinine of 1.3 with a EGFR of 61.  BP is well controlled today, but he reports overall poor control. I reviewed his blood pressure log. He has several high values, particularly clustered in the a.m. I have asked him to use his losartan, Lasix, and carvedilol first thing in the morning. He is to recheck his blood pressure approximately 45 minutes after using these medications. I suspect he will get better results on the current doses of medicine, not required change.  I have not made any further changes.  He is on a regimen of valsartan 160 mg, furosemide 20 mg MWF, carvedilol 12.5 mg twice daily.  He uses the medications regularly.  He denies any side effects to medications.  Pertinent negatives include no chest pain, palpitations, shortness of breath,

## 2025-07-17 DIAGNOSIS — I10 PRIMARY HYPERTENSION: ICD-10-CM

## 2025-07-17 DIAGNOSIS — E78.00 PURE HYPERCHOLESTEROLEMIA: ICD-10-CM

## 2025-07-17 DIAGNOSIS — R73.9 HYPERGLYCEMIA: ICD-10-CM

## 2025-07-18 LAB
ALBUMIN SERPL-MCNC: 4.4 G/DL (ref 3.4–5)
ALBUMIN/GLOB SERPL: 1.6 {RATIO} (ref 1.1–2.2)
ALP SERPL-CCNC: 71 U/L (ref 40–129)
ALT SERPL-CCNC: 30 U/L (ref 10–40)
ANION GAP SERPL CALCULATED.3IONS-SCNC: 11 MMOL/L (ref 3–16)
AST SERPL-CCNC: 35 U/L (ref 15–37)
BILIRUB SERPL-MCNC: 0.8 MG/DL (ref 0–1)
BUN SERPL-MCNC: 15 MG/DL (ref 7–20)
CALCIUM SERPL-MCNC: 9.1 MG/DL (ref 8.3–10.6)
CHLORIDE SERPL-SCNC: 106 MMOL/L (ref 99–110)
CHOLEST SERPL-MCNC: 138 MG/DL (ref 0–199)
CO2 SERPL-SCNC: 23 MMOL/L (ref 21–32)
CREAT SERPL-MCNC: 1.2 MG/DL (ref 0.8–1.3)
DEPRECATED RDW RBC AUTO: 13.6 % (ref 12.4–15.4)
EST. AVERAGE GLUCOSE BLD GHB EST-MCNC: 108.3 MG/DL
GFR SERPLBLD CREATININE-BSD FMLA CKD-EPI: 67 ML/MIN/{1.73_M2}
GLUCOSE SERPL-MCNC: 101 MG/DL (ref 70–99)
HBA1C MFR BLD: 5.4 %
HCT VFR BLD AUTO: 44.6 % (ref 40.5–52.5)
HDLC SERPL-MCNC: 49 MG/DL (ref 40–60)
HGB BLD-MCNC: 14.9 G/DL (ref 13.5–17.5)
LDL CHOLESTEROL: 76 MG/DL
MCH RBC QN AUTO: 33 PG (ref 26–34)
MCHC RBC AUTO-ENTMCNC: 33.5 G/DL (ref 31–36)
MCV RBC AUTO: 98.6 FL (ref 80–100)
PLATELET # BLD AUTO: 227 K/UL (ref 135–450)
PMV BLD AUTO: 10.5 FL (ref 5–10.5)
POTASSIUM SERPL-SCNC: 4.3 MMOL/L (ref 3.5–5.1)
PROT SERPL-MCNC: 7.1 G/DL (ref 6.4–8.2)
RBC # BLD AUTO: 4.53 M/UL (ref 4.2–5.9)
SODIUM SERPL-SCNC: 140 MMOL/L (ref 136–145)
TRIGL SERPL-MCNC: 66 MG/DL (ref 0–150)
VLDLC SERPL CALC-MCNC: 13 MG/DL
WBC # BLD AUTO: 5.9 K/UL (ref 4–11)

## 2025-07-29 ENCOUNTER — OFFICE VISIT (OUTPATIENT)
Dept: INTERNAL MEDICINE CLINIC | Age: 65
End: 2025-07-29
Payer: MEDICARE

## 2025-07-29 VITALS
BODY MASS INDEX: 29.34 KG/M2 | TEMPERATURE: 97.9 F | HEART RATE: 62 BPM | DIASTOLIC BLOOD PRESSURE: 82 MMHG | SYSTOLIC BLOOD PRESSURE: 138 MMHG | WEIGHT: 210.4 LBS | OXYGEN SATURATION: 98 %

## 2025-07-29 DIAGNOSIS — Z00.00 PREVENTATIVE HEALTH CARE: ICD-10-CM

## 2025-07-29 DIAGNOSIS — K70.9 LIVER DISEASE, CHRONIC, DUE TO ALCOHOL: ICD-10-CM

## 2025-07-29 DIAGNOSIS — R73.9 HYPERGLYCEMIA: ICD-10-CM

## 2025-07-29 DIAGNOSIS — I10 PRIMARY HYPERTENSION: Primary | ICD-10-CM

## 2025-07-29 DIAGNOSIS — E78.00 PURE HYPERCHOLESTEROLEMIA: ICD-10-CM

## 2025-07-29 PROCEDURE — G2211 COMPLEX E/M VISIT ADD ON: HCPCS | Performed by: HOSPITALIST

## 2025-07-29 PROCEDURE — 3079F DIAST BP 80-89 MM HG: CPT | Performed by: HOSPITALIST

## 2025-07-29 PROCEDURE — G8427 DOCREV CUR MEDS BY ELIG CLIN: HCPCS | Performed by: HOSPITALIST

## 2025-07-29 PROCEDURE — 1123F ACP DISCUSS/DSCN MKR DOCD: CPT | Performed by: HOSPITALIST

## 2025-07-29 PROCEDURE — 3075F SYST BP GE 130 - 139MM HG: CPT | Performed by: HOSPITALIST

## 2025-07-29 PROCEDURE — 99214 OFFICE O/P EST MOD 30 MIN: CPT | Performed by: HOSPITALIST

## 2025-07-29 PROCEDURE — 3017F COLORECTAL CA SCREEN DOC REV: CPT | Performed by: HOSPITALIST

## 2025-07-29 PROCEDURE — 1036F TOBACCO NON-USER: CPT | Performed by: HOSPITALIST

## 2025-07-29 PROCEDURE — G8417 CALC BMI ABV UP PARAM F/U: HCPCS | Performed by: HOSPITALIST

## 2025-07-29 RX ORDER — ATORVASTATIN CALCIUM 40 MG/1
40 TABLET, FILM COATED ORAL DAILY
Qty: 90 TABLET | Refills: 2 | Status: SHIPPED | OUTPATIENT
Start: 2025-07-29

## 2025-07-29 ASSESSMENT — ENCOUNTER SYMPTOMS
ABDOMINAL PAIN: 0
CHEST TIGHTNESS: 0
SORE THROAT: 0
BACK PAIN: 0
COUGH: 0
BLOOD IN STOOL: 0
SINUS PAIN: 0
NAUSEA: 0
WHEEZING: 0
DIARRHEA: 0
TROUBLE SWALLOWING: 0
CONSTIPATION: 0
ABDOMINAL DISTENTION: 0
SINUS PRESSURE: 0
SHORTNESS OF BREATH: 0
VOICE CHANGE: 0
VOMITING: 0

## 2025-07-29 NOTE — PROGRESS NOTES
[place] [facility]  Follow-up visit   2025    Grant Monterroso (:  1960) is a 64 y.o. male, here for follow-up:    Chief Complaint   Patient presents with    Follow-up Chronic Condition        HPI    In conjunction with this visit I have independently interpreted the laboratory work done 2025.  The patient had a normal comprehensive metabolic profile.  Lipid profile showed goal levels of total, LDL, and HDL cholesterol and statin therapy..  Hemoglobin A1c was normal at 5.4.  CBC was normal     Liver disease, chronic, due to alcohol (HCC)  -S/P transplant 2019 at .  On Tacrolimus and Cellcept.  Follows with Dr. Edwards at .    -Had HRS/Recovered.  Follows with Dr. Whitney.  Last Cr=1.2      Hyperlipidemia  This is a chronic problem.  Disease course is stable.  Current therapies include lifestyle modification and pharmacologic therapy.  The patient is using medications without side effects on a regular basis.  There are no compliance issues.  Last laboratory work reflect well-controlled lipids on atorvastatin 40.       Hypertension with CKD stage II  This is a chronic problem.  The disease course is stable/improving.  He is on a regimen of valsartan 160 mg, furosemide 20 mg MWF, carvedilol 12.5 mg twice daily.  He uses the medications regularly.  He denies any side effects to medications.  Pertinent negatives include no chest pain, palpitations, shortness of breath, or pedal edema.  The patient follows with Dr. Ferrara.  He will be monitoring blood pressure changes at home and if he is persistently high he will either see Dr. Ferrara or Dr. Whitney..       Former cigarette smoker  Denies smoking any longer.  Quit in     Osteoporosis:    Was seeing  Dr. Swenson.  He is currently on a hiatus from any bisphosphonates.    ROS  Review of Systems   Constitutional:  Negative for activity change, appetite change, chills, diaphoresis, fatigue, fever and unexpected weight change.   HENT:  Negative for sinus

## (undated) DEVICE — KIT LIGATION SMARTBAND

## (undated) DEVICE — DUETTE, MULTI-BAND MUCOSECTOMY: Brand: DUETTE